# Patient Record
Sex: MALE | Race: ASIAN | NOT HISPANIC OR LATINO | Employment: FULL TIME | URBAN - METROPOLITAN AREA
[De-identification: names, ages, dates, MRNs, and addresses within clinical notes are randomized per-mention and may not be internally consistent; named-entity substitution may affect disease eponyms.]

---

## 2022-03-22 ENCOUNTER — TELEPHONE (OUTPATIENT)
Dept: SCHEDULING | Facility: IMAGING CENTER | Age: 32
End: 2022-03-22

## 2022-03-22 SDOH — ECONOMIC STABILITY: INCOME INSECURITY: HOW HARD IS IT FOR YOU TO PAY FOR THE VERY BASICS LIKE FOOD, HOUSING, MEDICAL CARE, AND HEATING?: NOT HARD AT ALL

## 2022-03-22 SDOH — ECONOMIC STABILITY: FOOD INSECURITY: WITHIN THE PAST 12 MONTHS, THE FOOD YOU BOUGHT JUST DIDN'T LAST AND YOU DIDN'T HAVE MONEY TO GET MORE.: NEVER TRUE

## 2022-03-22 SDOH — ECONOMIC STABILITY: TRANSPORTATION INSECURITY
IN THE PAST 12 MONTHS, HAS LACK OF RELIABLE TRANSPORTATION KEPT YOU FROM MEDICAL APPOINTMENTS, MEETINGS, WORK OR FROM GETTING THINGS NEEDED FOR DAILY LIVING?: NO

## 2022-03-22 SDOH — ECONOMIC STABILITY: TRANSPORTATION INSECURITY
IN THE PAST 12 MONTHS, HAS THE LACK OF TRANSPORTATION KEPT YOU FROM MEDICAL APPOINTMENTS OR FROM GETTING MEDICATIONS?: NO

## 2022-03-22 SDOH — ECONOMIC STABILITY: HOUSING INSECURITY
IN THE LAST 12 MONTHS, WAS THERE A TIME WHEN YOU DID NOT HAVE A STEADY PLACE TO SLEEP OR SLEPT IN A SHELTER (INCLUDING NOW)?: NO

## 2022-03-22 SDOH — ECONOMIC STABILITY: TRANSPORTATION INSECURITY
IN THE PAST 12 MONTHS, HAS LACK OF TRANSPORTATION KEPT YOU FROM MEETINGS, WORK, OR FROM GETTING THINGS NEEDED FOR DAILY LIVING?: NO

## 2022-03-22 SDOH — HEALTH STABILITY: PHYSICAL HEALTH: ON AVERAGE, HOW MANY DAYS PER WEEK DO YOU ENGAGE IN MODERATE TO STRENUOUS EXERCISE (LIKE A BRISK WALK)?: 0 DAYS

## 2022-03-22 SDOH — ECONOMIC STABILITY: FOOD INSECURITY: WITHIN THE PAST 12 MONTHS, YOU WORRIED THAT YOUR FOOD WOULD RUN OUT BEFORE YOU GOT MONEY TO BUY MORE.: NEVER TRUE

## 2022-03-22 SDOH — ECONOMIC STABILITY: INCOME INSECURITY: IN THE LAST 12 MONTHS, WAS THERE A TIME WHEN YOU WERE NOT ABLE TO PAY THE MORTGAGE OR RENT ON TIME?: NO

## 2022-03-22 SDOH — ECONOMIC STABILITY: HOUSING INSECURITY: IN THE LAST 12 MONTHS, HOW MANY PLACES HAVE YOU LIVED?: 2

## 2022-03-22 SDOH — HEALTH STABILITY: PHYSICAL HEALTH: ON AVERAGE, HOW MANY MINUTES DO YOU ENGAGE IN EXERCISE AT THIS LEVEL?: 0 MIN

## 2022-03-22 SDOH — HEALTH STABILITY: MENTAL HEALTH
STRESS IS WHEN SOMEONE FEELS TENSE, NERVOUS, ANXIOUS, OR CAN'T SLEEP AT NIGHT BECAUSE THEIR MIND IS TROUBLED. HOW STRESSED ARE YOU?: NOT AT ALL

## 2022-03-22 ASSESSMENT — SOCIAL DETERMINANTS OF HEALTH (SDOH)
HOW OFTEN DO YOU ATTEND CHURCH OR RELIGIOUS SERVICES?: NEVER
HOW OFTEN DO YOU GET TOGETHER WITH FRIENDS OR RELATIVES?: THREE TIMES A WEEK
HOW OFTEN DO YOU ATTEND CHURCH OR RELIGIOUS SERVICES?: NEVER
IN A TYPICAL WEEK, HOW MANY TIMES DO YOU TALK ON THE PHONE WITH FAMILY, FRIENDS, OR NEIGHBORS?: TWICE A WEEK
HOW OFTEN DO YOU HAVE A DRINK CONTAINING ALCOHOL: NEVER
HOW OFTEN DO YOU ATTENT MEETINGS OF THE CLUB OR ORGANIZATION YOU BELONG TO?: NEVER
HOW HARD IS IT FOR YOU TO PAY FOR THE VERY BASICS LIKE FOOD, HOUSING, MEDICAL CARE, AND HEATING?: NOT HARD AT ALL
HOW OFTEN DO YOU GET TOGETHER WITH FRIENDS OR RELATIVES?: THREE TIMES A WEEK
DO YOU BELONG TO ANY CLUBS OR ORGANIZATIONS SUCH AS CHURCH GROUPS UNIONS, FRATERNAL OR ATHLETIC GROUPS, OR SCHOOL GROUPS?: NO
WITHIN THE PAST 12 MONTHS, YOU WORRIED THAT YOUR FOOD WOULD RUN OUT BEFORE YOU GOT THE MONEY TO BUY MORE: NEVER TRUE
HOW OFTEN DO YOU ATTENT MEETINGS OF THE CLUB OR ORGANIZATION YOU BELONG TO?: NEVER
HOW OFTEN DO YOU HAVE SIX OR MORE DRINKS ON ONE OCCASION: NEVER
IN A TYPICAL WEEK, HOW MANY TIMES DO YOU TALK ON THE PHONE WITH FAMILY, FRIENDS, OR NEIGHBORS?: TWICE A WEEK
DO YOU BELONG TO ANY CLUBS OR ORGANIZATIONS SUCH AS CHURCH GROUPS UNIONS, FRATERNAL OR ATHLETIC GROUPS, OR SCHOOL GROUPS?: NO

## 2022-03-22 ASSESSMENT — LIFESTYLE VARIABLES
HOW OFTEN DO YOU HAVE A DRINK CONTAINING ALCOHOL: NEVER
HOW OFTEN DO YOU HAVE SIX OR MORE DRINKS ON ONE OCCASION: NEVER

## 2022-03-23 ENCOUNTER — OFFICE VISIT (OUTPATIENT)
Dept: INTERNAL MEDICINE | Facility: OTHER | Age: 32
End: 2022-03-23
Payer: COMMERCIAL

## 2022-03-23 VITALS
OXYGEN SATURATION: 94 % | TEMPERATURE: 97.6 F | HEIGHT: 73 IN | WEIGHT: 237.6 LBS | BODY MASS INDEX: 31.49 KG/M2 | SYSTOLIC BLOOD PRESSURE: 126 MMHG | HEART RATE: 85 BPM | DIASTOLIC BLOOD PRESSURE: 78 MMHG

## 2022-03-23 DIAGNOSIS — R00.0 TACHYCARDIA: ICD-10-CM

## 2022-03-23 DIAGNOSIS — M25.512 LEFT SHOULDER PAIN, UNSPECIFIED CHRONICITY: ICD-10-CM

## 2022-03-23 DIAGNOSIS — Z87.39 PERSONAL HISTORY OF GOUT: ICD-10-CM

## 2022-03-23 DIAGNOSIS — E66.9 OBESITY (BMI 30-39.9): ICD-10-CM

## 2022-03-23 PROCEDURE — 99204 OFFICE O/P NEW MOD 45 MIN: CPT | Mod: GC | Performed by: STUDENT IN AN ORGANIZED HEALTH CARE EDUCATION/TRAINING PROGRAM

## 2022-03-23 ASSESSMENT — PATIENT HEALTH QUESTIONNAIRE - PHQ9: CLINICAL INTERPRETATION OF PHQ2 SCORE: 0

## 2022-03-23 NOTE — PROGRESS NOTES
Danny Haskins is a 31 y.o. male here to establish care     HPI: 31-year-old male with a past medical history of heart attack several years ago on no current medications presents today for postsurgical follow-up.  -Patient states that he was seen at [place] urgent [facility] yesterday after he noticed his heart rate go up to the 120s on his watch.  He also had associated sweating of his palms during this time.  Patient denies any prior episodes, no chest pain, palpitations, fevers or chills, cough, burning sensation urination associated with this.  No bleeding episodes as well.  EKG done at [facility] was not concerning.  Patient states that his heart rate was elevated for about an hour and then subsided.  -He also endorses left shoulder discomfort, since January, denies any trauma associated with this.  Has progressively gotten better to the point that he does not need to take any pain medications at this time,no limitation of range of motion as well.  No tingling or numbness.  -Patient states that he had a history of gout several years ago while in [facility].  He also had an episode where his left great toe was swollen in January at which time it subsided spontaneously within a week.  He has not had any recent lab work.  -Review of systems is negative other than mentioned above.      Current medicines (including changes today)  No current outpatient medications on file.     No current facility-administered medications for this visit.     He  has no past medical history on file.  He  has no past surgical history on file.  Social History     Tobacco Use   • Smoking status: Current Every Day Smoker     Start date: 2009   • Smokeless tobacco: Never Used   • Tobacco comment: 10 cigarettes daily   Substance Use Topics   • Drug use: Never     Social History     Social History Narrative   • Not on file     History reviewed. No pertinent family history.  No family status information on file.       ROS  See  "HPI     Objective:     Physical Exam:  /78 (BP Location: Left arm, Patient Position: Sitting, BP Cuff Size: Large adult)   Pulse 85   Temp 36.4 °C (97.6 °F) (Temporal)   Ht 1.864 m (6' 1.4\")   Wt 108 kg (237 lb 9.6 oz)   SpO2 94%  Body mass index is 31.01 kg/m².  Constitutional: Alert. Well appearing. No distress.  Skin: Warm, dry, good turgor, no visible rashes.  Eye: Equal, round and reactive to light, conjunctiva clear, lids normal.  ENMT: Moist mucous membranes. Normal dentition. Oropharynx clear.  Neck: Trachea midline, no masses, no thyromegaly. No cervical or supraclavicular lymphadenopathy.  Respiratory: Normal effort. Lungs are clear to auscultation bilaterally.  Cardiovascular: Regular rate and rhythm. Normal S1/S2. No murmurs, rubs or gallops.   Abdomen: Soft, non-tender, non-distended. No masses, no hepatosplenomegaly.  Neuro: Moves all four extremities. No facial droop.  Psych: Answers questions appropriately. Normal affect and mood.      Assessment and Plan:     1. Obesity (BMI 30-39.9)  Patient's BMI is elevated, discussed about healthy lifestyle changes including exercise.  -We will check lipid panel  - Patient identified as having weight management issue.  Appropriate orders and counseling given.  - Lipid Profile; Future    2. Tachycardia  Patient had episode of tachycardia, EKG done in the ER yesterday was not concerning for any arrhythmias.  Heart rate was in the 90s in the ER.  This was associated with sweating of his palms.  Blood pressure today is within normal limits.  No signs of infection.  Differential diagnosis include anemia, dehydration, hypothyroidism.  Will check lab work as noted below  - TSH WITH REFLEX TO FT4; Future  - CBC WITHOUT DIFFERENTIAL; Future  - Comp Metabolic Panel; Future    3. Personal history of gout  No acute episode, per patient had an episode in January and 1 several years ago in [facility]  - URIC ACID; Future    4. Left shoulder pain, unspecified " chronicity  Ongoing since January, gradually better, unclear if there was any inciting event.  Patient denies any trauma.  No limitation of range of motion on exam.  -Patient states that he is not needing to use any pain medication as it does not bother him that much.  Will use heat compression if needed.  We will also check vitamin D levels.  - VITAMIN D,25 HYDROXY; Future        Follow up: Return in about 1 month (around 4/23/2022).         PLEASE NOTE: This note was created using voice recognition software.

## 2022-03-25 ENCOUNTER — HOSPITAL ENCOUNTER (OUTPATIENT)
Dept: LAB | Facility: MEDICAL CENTER | Age: 32
End: 2022-03-25
Attending: STUDENT IN AN ORGANIZED HEALTH CARE EDUCATION/TRAINING PROGRAM
Payer: COMMERCIAL

## 2022-03-25 DIAGNOSIS — R00.0 TACHYCARDIA: ICD-10-CM

## 2022-03-25 DIAGNOSIS — Z87.39 PERSONAL HISTORY OF GOUT: ICD-10-CM

## 2022-03-25 DIAGNOSIS — M25.512 LEFT SHOULDER PAIN, UNSPECIFIED CHRONICITY: ICD-10-CM

## 2022-03-25 DIAGNOSIS — E66.9 OBESITY (BMI 30-39.9): ICD-10-CM

## 2022-03-25 LAB
25(OH)D3 SERPL-MCNC: 22 NG/ML (ref 30–100)
ALBUMIN SERPL BCP-MCNC: 4.6 G/DL (ref 3.2–4.9)
ALBUMIN/GLOB SERPL: 1.7 G/DL
ALP SERPL-CCNC: 79 U/L (ref 30–99)
ALT SERPL-CCNC: 29 U/L (ref 2–50)
ANION GAP SERPL CALC-SCNC: 13 MMOL/L (ref 7–16)
AST SERPL-CCNC: 25 U/L (ref 12–45)
BILIRUB SERPL-MCNC: 0.5 MG/DL (ref 0.1–1.5)
BUN SERPL-MCNC: 15 MG/DL (ref 8–22)
CALCIUM SERPL-MCNC: 9.7 MG/DL (ref 8.5–10.5)
CHLORIDE SERPL-SCNC: 101 MMOL/L (ref 96–112)
CHOLEST SERPL-MCNC: 186 MG/DL (ref 100–199)
CO2 SERPL-SCNC: 25 MMOL/L (ref 20–33)
CREAT SERPL-MCNC: 0.96 MG/DL (ref 0.5–1.4)
ERYTHROCYTE [DISTWIDTH] IN BLOOD BY AUTOMATED COUNT: 43.2 FL (ref 35.9–50)
FASTING STATUS PATIENT QL REPORTED: NORMAL
GFR SERPLBLD CREATININE-BSD FMLA CKD-EPI: 108 ML/MIN/1.73 M 2
GLOBULIN SER CALC-MCNC: 2.7 G/DL (ref 1.9–3.5)
GLUCOSE SERPL-MCNC: 104 MG/DL (ref 65–99)
HCT VFR BLD AUTO: 43.8 % (ref 42–52)
HDLC SERPL-MCNC: 35 MG/DL
HGB BLD-MCNC: 14.2 G/DL (ref 14–18)
LDLC SERPL CALC-MCNC: ABNORMAL MG/DL
MCH RBC QN AUTO: 28.3 PG (ref 27–33)
MCHC RBC AUTO-ENTMCNC: 32.4 G/DL (ref 33.7–35.3)
MCV RBC AUTO: 87.3 FL (ref 81.4–97.8)
PLATELET # BLD AUTO: 278 K/UL (ref 164–446)
PMV BLD AUTO: 11 FL (ref 9–12.9)
POTASSIUM SERPL-SCNC: 4.9 MMOL/L (ref 3.6–5.5)
PROT SERPL-MCNC: 7.3 G/DL (ref 6–8.2)
RBC # BLD AUTO: 5.02 M/UL (ref 4.7–6.1)
SODIUM SERPL-SCNC: 139 MMOL/L (ref 135–145)
TRIGL SERPL-MCNC: 468 MG/DL (ref 0–149)
TSH SERPL DL<=0.005 MIU/L-ACNC: 2.25 UIU/ML (ref 0.38–5.33)
URATE SERPL-MCNC: 9.1 MG/DL (ref 2.5–8.3)
WBC # BLD AUTO: 7 K/UL (ref 4.8–10.8)

## 2022-03-25 PROCEDURE — 84443 ASSAY THYROID STIM HORMONE: CPT

## 2022-03-25 PROCEDURE — 36415 COLL VENOUS BLD VENIPUNCTURE: CPT

## 2022-03-25 PROCEDURE — 80061 LIPID PANEL: CPT

## 2022-03-25 PROCEDURE — 82306 VITAMIN D 25 HYDROXY: CPT

## 2022-03-25 PROCEDURE — 80053 COMPREHEN METABOLIC PANEL: CPT

## 2022-03-25 PROCEDURE — 85027 COMPLETE CBC AUTOMATED: CPT

## 2022-03-25 PROCEDURE — 84550 ASSAY OF BLOOD/URIC ACID: CPT

## 2022-03-27 ENCOUNTER — TELEPHONE (OUTPATIENT)
Dept: HOSPITALIST | Facility: MEDICAL CENTER | Age: 32
End: 2022-03-27
Payer: COMMERCIAL

## 2022-03-28 NOTE — TELEPHONE ENCOUNTER
Patient triglycerides are little high and uric acid is elevated, vitamin d is a little low as well. Please advice patient to schedule a telemedicine appointment sooner to discuss about treatment options for the same. He will need to be started on vitamin d supplements for vitamin d deficiency and allopurinol for gout. As there are several issues that need to addresses, it will be better if he can have a followup(ok for telemedicine within a week)

## 2022-03-31 ENCOUNTER — OFFICE VISIT (OUTPATIENT)
Dept: INTERNAL MEDICINE | Facility: OTHER | Age: 32
End: 2022-03-31
Payer: COMMERCIAL

## 2022-03-31 VITALS
HEART RATE: 94 BPM | WEIGHT: 240 LBS | DIASTOLIC BLOOD PRESSURE: 76 MMHG | SYSTOLIC BLOOD PRESSURE: 113 MMHG | OXYGEN SATURATION: 96 % | TEMPERATURE: 97.3 F | BODY MASS INDEX: 31.81 KG/M2 | HEIGHT: 73 IN

## 2022-03-31 DIAGNOSIS — E55.9 VITAMIN D INSUFFICIENCY: ICD-10-CM

## 2022-03-31 DIAGNOSIS — M10.072 ACUTE IDIOPATHIC GOUT INVOLVING TOE OF LEFT FOOT: ICD-10-CM

## 2022-03-31 DIAGNOSIS — E66.9 OBESITY (BMI 30-39.9): ICD-10-CM

## 2022-03-31 DIAGNOSIS — Z72.0 TOBACCO USE: ICD-10-CM

## 2022-03-31 DIAGNOSIS — E79.0 HYPERURICEMIA: ICD-10-CM

## 2022-03-31 DIAGNOSIS — E78.1 HYPERTRIGLYCERIDEMIA: ICD-10-CM

## 2022-03-31 PROCEDURE — 99214 OFFICE O/P EST MOD 30 MIN: CPT | Mod: GC

## 2022-03-31 RX ORDER — NICOTINE 21 MG/24HR
1 PATCH, TRANSDERMAL 24 HOURS TRANSDERMAL EVERY 24 HOURS
Qty: 28 PATCH | Refills: 2 | Status: SHIPPED | OUTPATIENT
Start: 2022-03-31 | End: 2022-06-13 | Stop reason: SDUPTHER

## 2022-03-31 RX ORDER — IBUPROFEN 800 MG/1
800 TABLET ORAL EVERY 8 HOURS PRN
Qty: 30 TABLET | Refills: 1 | Status: SHIPPED | OUTPATIENT
Start: 2022-03-31 | End: 2022-03-31

## 2022-03-31 RX ORDER — IBUPROFEN 800 MG/1
800 TABLET ORAL EVERY 8 HOURS PRN
Qty: 30 TABLET | Refills: 1 | Status: SHIPPED | OUTPATIENT
Start: 2022-03-31 | End: 2022-06-17

## 2022-03-31 ASSESSMENT — FIBROSIS 4 INDEX: FIB4 SCORE: 0.5

## 2022-03-31 NOTE — PATIENT INSTRUCTIONS
Please  your prescription for ibuprofen      High Triglycerides Eating Plan  Triglycerides are a type of fat in the blood. High levels of triglycerides can increase your risk of heart disease and stroke. If your triglyceride levels are high, choosing the right foods can help lower your triglycerides and keep your heart healthy. Work with your health care provider or a diet and nutrition specialist (dietitian) to develop an eating plan that is right for you.  What are tips for following this plan?  General guidelines    · Lose weight, if you are overweight. For most people, losing 5-10 lbs (2-5 kg) helps lower triglyceride levels. A weight-loss plan may include.  ? 30 minutes of exercise at least 5 days a week.  ? Reducing the amount of calories, sugar, and fat you eat.  · Eat a wide variety of fresh fruits, vegetables, and whole grains. These foods are high in fiber.  · Eat foods that contain healthy fats, such as fatty fish, nuts, seeds, and olive oil.  · Avoid foods that are high in added sugar, added salt (sodium), saturated fat, and trans fat.  · Avoid low-fiber, refined carbohydrates such as white bread, crackers, noodles, and white rice.  · Avoid foods with partially hydrogenated oils (trans fats), such as fried foods or stick margarine.  · Limit alcohol intake to no more than 1 drink a day for nonpregnant women and 2 drinks a day for men. One drink equals 12 oz of beer, 5 oz of wine, or 1½ oz of hard liquor. Your health care provider may recommend that you drink less depending on your overall health.  Reading food labels  · Check food labels for the amount of saturated fat. Choose foods with no or very little saturated fat.  · Check food labels for the amount of trans fat. Choose foods with no trans fat.  · Check food labels for the amount of cholesterol. Choose foods low in cholesterol. Ask your dietitian how much cholesterol you should have each day.  · Check food labels for the amount of sodium.  Choose foods with less than 140 milligrams (mg) per serving.  Shopping  · Buy dairy products labeled as nonfat (skim) or low-fat (1%).  · Avoid buying processed or prepackaged foods. These are often high in added sugar, sodium, and fat.  Cooking  · Choose healthy fats when cooking, such as olive oil or canola oil.  · Cook foods using lower fat methods, such as baking, broiling, boiling, or grilling.  · Make your own sauces, dressings, and marinades when possible, instead of buying them. Store-bought sauces, dressings, and marinades are often high in sodium and sugar.  Meal planning  · Eat more home-cooked food and less restaurant, buffet, and fast food.  · Eat fatty fish at least 2 times each week. Examples of fatty fish include salmon, trout, mackerel, tuna, and herring.  · If you eat whole eggs, do not eat more than 3 egg yolks per week.  What foods are recommended?  The items listed may not be a complete list. Talk with your dietitian about what dietary choices are best for you.  Grains  Whole wheat or whole grain breads, crackers, cereals, and pasta. Unsweetened oatmeal. Bulgur. Barley. Quinoa. Brown rice. Whole wheat flour tortillas.  Vegetables  Fresh or frozen vegetables. Low-sodium canned vegetables.  Fruits  All fresh, canned (in natural juice), or frozen fruits.  Meats and other protein foods  Skinless chicken or turkey. Ground chicken or turkey. Lean cuts of pork, trimmed of fat. Fish and seafood, especially salmon, trout, and herring. Egg whites. Dried beans, peas, or lentils. Unsalted nuts or seeds. Unsalted canned beans. Natural peanut or almond butter.  Dairy  Low-fat dairy products. Skim or low-fat (1%) milk. Reduced fat (2%) and low-sodium cheese. Low-fat ricotta cheese. Low-fat cottage cheese. Plain, low-fat yogurt.  Fats and oils  Tub margarine without trans fats. Light or reduced-fat mayonnaise. Light or reduced-fat salad dressings. Avocado. Safflower, olive, sunflower, soybean, and canola  oils.  What foods are not recommended?  The items listed may not be a complete list. Talk with your dietitian about what dietary choices are best for you.  Grains  White bread. White (regular) pasta. White rice. Cornbread. Bagels. Pastries. Crackers that contain trans fat.  Vegetables  Creamed or fried vegetables. Vegetables in a cheese sauce.  Fruits  Sweetened dried fruit. Canned fruit in syrup. Fruit juice.  Meats and other protein foods  Fatty cuts of meat. Ribs. Chicken wings. Reeves. Sausage. Bologna. Salami. Chitterlings. Fatback. Hot dogs. Bratwurst. Packaged lunch meats.  Dairy  Whole or reduced-fat (2%) milk. Half-and-half. Cream cheese. Full-fat or sweetened yogurt. Full-fat cheese. Nondairy creamers. Whipped toppings. Processed cheese or cheese spreads. Cheese curds.  Beverages  Alcohol. Sweetened drinks, such as soda, lemonade, fruit drinks, or punches.  Fats and oils  Butter. Stick margarine. Lard. Shortening. Ghee. Reeves fat. Tropical oils, such as coconut, palm kernel, or palm oils.  Sweets and desserts  Corn syrup. Sugars. Honey. Molasses. Candy. Jam and jelly. Syrup. Sweetened cereals. Cookies. Pies. Cakes. Donuts. Muffins. Ice cream.  Condiments  Store-bought sauces, dressings, and marinades that are high in sugar, such as ketchup and barbecue sauce.  Summary  · High levels of triglycerides can increase the risk of heart disease and stroke. Choosing the right foods can help lower your triglycerides.  · Eat plenty of fresh fruits, vegetables, and whole grains. Choose low-fat dairy and lean meats. Eat fatty fish at least twice a week.  · Avoid processed and prepackaged foods with added sugar, sodium, saturated fat, and trans fat.  · If you need suggestions or have questions about what types of food are good for you, talk with your health care provider or a dietitian.  This information is not intended to replace advice given to you by your health care provider. Make sure you discuss any questions you  have with your health care provider.  Document Released: 10/05/2005 Document Revised: 11/30/2018 Document Reviewed: 02/20/2018  Elsevier Patient Education © 2020 [employer]    Gout    Gout is painful swelling of your joints. Gout is a type of arthritis. It is caused by having too much uric acid in your body. Uric acid is a chemical that is made when your body breaks down substances called purines. If your body has too much uric acid, sharp crystals can form and build up in your joints. This causes pain and swelling.  Gout attacks can happen quickly and be very painful (acute gout). Over time, the attacks can affect more joints and happen more often (chronic gout).  What are the causes?  · Too much uric acid in your blood. This can happen because:  ? Your kidneys do not remove enough uric acid from your blood.  ? Your body makes too much uric acid.  ? You eat too many foods that are high in purines. These foods include organ meats, some seafood, and beer.  · Trauma or stress.  What increases the risk?  · Having a family history of gout.  · Being male and middle-aged.  · Being female and having gone through menopause.  · Being very overweight (obese).  · Drinking alcohol, especially beer.  · Not having enough water in the body (being dehydrated).  · Losing weight too quickly.  · Having an organ transplant.  · Having lead poisoning.  · Taking certain medicines.  · Having kidney disease.  · Having a skin condition called psoriasis.  What are the signs or symptoms?  An attack of acute gout usually happens in just one joint. The most common place is the big toe. Attacks often start at night. Other joints that may be affected include joints of the feet, ankle, knee, fingers, wrist, or elbow. Symptoms of an attack may include:  · Very bad pain.  · Warmth.  · Swelling.  · Stiffness.  · Shiny, red, or purple skin.  · Tenderness. The affected joint may be very painful to touch.  · Chills and fever.  Chronic gout may cause  symptoms more often. More joints may be involved. You may also have white or yellow lumps (tophi) on your hands or feet or in other areas near your joints.  How is this treated?  · Treatment for this condition has two phases: treating an acute attack and preventing future attacks.  · Acute gout treatment may include:  ? NSAIDs.  ? Steroids. These are taken by mouth or injected into a joint.  ? Colchicine. This medicine relieves pain and swelling. It can be given by mouth or through an IV tube.  · Preventive treatment may include:  ? Taking small doses of NSAIDs or colchicine daily.  ? Using a medicine that reduces uric acid levels in your blood.  ? Making changes to your diet. You may need to see a food expert (dietitian) about what to eat and drink to prevent gout.  Follow these instructions at home:  During a gout attack    · If told, put ice on the painful area:  ? Put ice in a plastic bag.  ? Place a towel between your skin and the bag.  ? Leave the ice on for 20 minutes, 2-3 times a day.  · Raise (elevate) the painful joint above the level of your heart as often as you can.  · Rest the joint as much as possible. If the joint is in your leg, you may be given crutches.  · Follow instructions from your doctor about what you cannot eat or drink.  Avoiding future gout attacks  · Eat a low-purine diet. Avoid foods and drinks such as:  ? Liver.  ? Kidney.  ? Anchovies.  ? Asparagus.  ? Herring.  ? Mushrooms.  ? Mussels.  ? Beer.  · Stay at a healthy weight. If you want to lose weight, talk with your doctor. Do not lose weight too fast.  · Start or continue an exercise plan as told by your doctor.  Eating and drinking  · Drink enough fluids to keep your pee (urine) pale yellow.  · If you drink alcohol:  ? Limit how much you use to:  § 0-1 drink a day for women.  § 0-2 drinks a day for men.  ? Be aware of how much alcohol is in your drink. In the [place] one drink equals one 12 oz bottle of beer (355 mL), one 5 oz glass  of wine (148 mL), or one 1½ oz glass of hard liquor (44 mL).  General instructions  · Take over-the-counter and prescription medicines only as told by your doctor.  · Do not drive or use heavy machinery while taking prescription pain medicine.  · Return to your normal activities as told by your doctor. Ask your doctor what activities are safe for you.  · Keep all follow-up visits as told by your doctor. This is important.  Contact a doctor if:  · You have another gout attack.  · You still have symptoms of a gout attack after 10 days of treatment.  · You have problems (side effects) because of your medicines.  · You have chills or a fever.  · You have burning pain when you pee (urinate).  · You have pain in your lower back or belly.  Get help right away if:  · You have very bad pain.  · Your pain cannot be controlled.  · You cannot pee.  Summary  · Gout is painful swelling of the joints.  · The most common site of pain is the big toe, but it can affect other joints.  · Medicines and avoiding some foods can help to prevent and treat gout attacks.  This information is not intended to replace advice given to you by your health care provider. Make sure you discuss any questions you have with your health care provider.  Document Released: 09/26/2009 Document Revised: 07/10/2019 Document Reviewed: 07/10/2019  Elsevier Patient Education © 2020 [employer]    Low-Purine Eating Plan  A low-purine eating plan involves making food choices to limit your intake of purine. Purine is a kind of uric acid. Too much uric acid in your blood can cause certain conditions, such as gout and kidney stones. Eating a low-purine diet can help control these conditions.  What are tips for following this plan?  Reading food labels    · Avoid foods with saturated or Trans fat.  · Check the ingredient list of grains-based foods, such as bread and cereal, to make sure that they contain whole grains.  · Check the ingredient list of sauces or soups  to make sure they do not contain meat or fish.  · When choosing soft drinks, check the ingredient list to make sure they do not contain high-fructose corn syrup.  Shopping  · Buy plenty of fresh fruits and vegetables.  · Avoid buying canned or fresh fish.  · Buy dairy products labeled as low-fat or nonfat.  · Avoid buying premade or processed foods. These foods are often high in fat, salt (sodium), and added sugar.  Cooking  · Use olive oil instead of butter when cooking. Oils like olive oil, canola oil, and sunflower oil contain healthy fats.  Meal planning  · Learn which foods do or do not affect you. If you find out that a food tends to cause your gout symptoms to flare up, avoid eating that food. You can enjoy foods that do not cause problems. If you have any questions about a food item, talk with your dietitian or health care provider.  · Limit foods high in fat, especially saturated fat. Fat makes it harder for your body to get rid of uric acid.  · Choose foods that are lower in fat and are lean sources of protein.  General guidelines  · Limit alcohol intake to no more than 1 drink a day for nonpregnant women and 2 drinks a day for men. One drink equals 12 oz of beer, 5 oz of wine, or 1½ oz of hard liquor. Alcohol can affect the way your body gets rid of uric acid.  · Drink plenty of water to keep your urine clear or pale yellow. Fluids can help remove uric acid from your body.  · If directed by your health care provider, take a vitamin C supplement.  · Work with your health care provider and dietitian to develop a plan to achieve or maintain a healthy weight. Losing weight can help reduce uric acid in your blood.  What foods are recommended?  The items listed may not be a complete list. Talk with your dietitian about what dietary choices are best for you.  Foods low in purines  Foods low in purines do not need to be limited. These include:  · All fruits.  · All low-purine vegetables, pickles, and  olives.  · Breads, pasta, rice, cornbread, and popcorn. Cake and other baked goods.  · All dairy foods.  · Eggs, nuts, and nut butters.  · Spices and condiments, such as salt, herbs, and vinegar.  · Plant oils, butter, and margarine.  · Water, sugar-free soft drinks, tea, coffee, and cocoa.  · Vegetable-based soups, broths, sauces, and gravies.  Foods moderate in purines  Foods moderate in purines should be limited to the amounts listed.  · ½ cup of asparagus, cauliflower, spinach, mushrooms, or green peas, each day.  · 2/3 cup uncooked oatmeal, each day.  · ¼ cup dry wheat bran or wheat germ, each day.  · 2-3 ounces of meat or poultry, each day.  · 4-6 ounces of shellfish, such as crab, lobster, oysters, or shrimp, each day.  · 1 cup cooked beans, peas, or lentils, each day.  · Soup, broths, or bouillon made from meat or fish. Limit these foods as much as possible.  What foods are not recommended?  The items listed may not be a complete list. Talk with your dietitian about what dietary choices are best for you.  Limit your intake of foods high in purines, including:  · Beer and other alcohol.  · Meat-based gravy or sauce.  · Canned or fresh fish, such as:  ? Anchovies, sardines, herring, and tuna.  ? Mussels and scallops.  ? Codfish, trout, and marzena.  · Reeves.  · Organ meats, such as:  ? Liver or kidney.  ? Tripe.  ? Sweetbreads (thymus gland or pancreas).  · Wild game or goose.  · Yeast or yeast extract supplements.  · Drinks sweetened with high-fructose corn syrup.  Summary  · Eating a low-purine diet can help control conditions caused by too much uric acid in the body, such as gout or kidney stones.  · Choose low-purine foods, limit alcohol, and limit foods high in fat.  · You will learn over time which foods do or do not affect you. If you find out that a food tends to cause your gout symptoms to flare up, avoid eating that food.  This information is not intended to replace advice given to you by your health  care provider. Make sure you discuss any questions you have with your health care provider.  Document Released: 04/13/2012 Document Revised: 11/30/2018 Document Reviewed: 01/31/2018  Elsevier Patient Education © 2020 [employer]

## 2022-03-31 NOTE — PROGRESS NOTES
Date of Service:  3/31/22    CC: bloodwork results    HPI:  Gil Haskins  is a 31 y.o. male presents here for bloodwork followup. Seen by Dr. Thorne on 3/23/22. Patient has hx of gout attack years ago and another similar episode affecting the left big toe in January but did not have any medical insurance at the time, so did not seek medical attention. The left big toe was painful but not red, lasted for about 7 days, and went away on its own, and patient did not take any medications.     Patient states on Monday at work, he had sudden sensation of electric shock to top of his head lasting 15 minutes, during the middle of an argument. Patient described it as 7/10 in severity, and gradually improved with time. Denies any eye pain, numbness, focal motor or sensory deficits, vision changes. Denies any similar episodes in the past. Sensation not present today.    Health maintenance-   Diet- patient eats twice a day, both times after work/4pm. Diet consists mostly of beef agarwal usually olive oil based and rice.   Lifestyle- patient sleeps about 8 hours a day, feels well energized and denies any fatigue. Takes multivitamin about 3 times a week.   Tobacco- smokes 1/2ppd, would like to quit, referred to tobacco cessation resources at last clinic visit. States tried quitting before by vaping which did not work.     Review of systems:  Review of Systems   Constitutional: Negative for chills, fever and malaise/fatigue.   HENT: Negative.    Eyes: Negative.    Respiratory: Negative for cough and shortness of breath.    Cardiovascular: Negative for chest pain and leg swelling.   Gastrointestinal: Negative for abdominal pain, blood in stool, constipation, diarrhea, melena, nausea and vomiting.   Genitourinary: Negative.    Musculoskeletal: Negative.    Skin: Negative.    Neurological: Negative for dizziness, tingling, sensory change, speech change, focal weakness and headaches.        Past Medical History:  Patient Active  Problem List    Diagnosis Date Noted   • Obesity (BMI 30-39.9) 03/23/2022   • Tachycardia 03/23/2022   • Personal history of gout 03/23/2022       Past Surgical History:    has no past surgical history on file.    Medications:  Current Outpatient Medications   Medication Sig Dispense Refill   • ibuprofen (MOTRIN) 800 MG Tab Take 1 Tablet by mouth every 8 hours as needed. Within 24-48 hours of gout flare onset, reduce dose as symptoms improve, discontinue 2-3 days after resolution of clinical signs, usual duration 5-7 days 30 Tablet 1   • nicotine (NICODERM) 14 MG/24HR PATCH 24 HR Place 1 Patch on the skin every 24 hours. 28 Patch 2     No current facility-administered medications for this visit.       Allergies:  No Known Allergies    Family History:   family history is not on file.     Social History:    Social History     Tobacco Use   • Smoking status: Current Every Day Smoker     Start date: 2009   • Smokeless tobacco: Never Used   • Tobacco comment: 10 cigarettes daily   Substance Use Topics   • Drug use: Never     Physical Exam:  Vitals:    03/31/22 0758   BP: 113/76   Pulse: 94   Temp: 36.3 °C (97.3 °F)   SpO2: 96%     Body mass index is 31.32 kg/m².  Physical Exam  Constitutional:       General: He is not in acute distress.     Appearance: Normal appearance. He is not toxic-appearing.   HENT:      Head: Normocephalic.      Right Ear: External ear normal.      Left Ear: External ear normal.      Nose: Nose normal.   Eyes:      General: No scleral icterus.     Extraocular Movements: Extraocular movements intact.      Conjunctiva/sclera: Conjunctivae normal.   Cardiovascular:      Rate and Rhythm: Normal rate and regular rhythm.      Heart sounds: Normal heart sounds. No murmur heard.  Pulmonary:      Effort: No respiratory distress.      Breath sounds: Normal breath sounds.   Abdominal:      Palpations: Abdomen is soft.      Tenderness: There is no abdominal tenderness.   Musculoskeletal:         General: No  swelling or tenderness.      Cervical back: No rigidity or tenderness.      Right lower leg: No edema.      Left lower leg: No edema.   Skin:     General: Skin is warm and dry.      Findings: No rash.   Neurological:      General: No focal deficit present.      Mental Status: He is alert.      Motor: No weakness.   Psychiatric:         Mood and Affect: Mood normal.         Behavior: Behavior normal.        Assessment/Plan:    1. Hypertriglyceridemia  - Referral to Nutrition Services  - Lipid Profile; Future  - Comp Metabolic Panel; Future    - lipid panel 3/25/22: , , HDL 35, LDL unable to be calculated.   - counseled on healthy diet and exercise  - pt would like to be referred to dietician to help tailor the recommendations to the foods he eat.  - As TC wnl, only TG high, no familial hx of hyperlipidemia. Will trial diet and lifestyle changes before considering statin therapy. Will followup in 10 weeks with lipid panel and CMP. If lipids remain high, will initiate on medications.     2. Hyperuricemia  - Referral to Nutrition Services  - uric acid 9.1, mildly high  - will refer to dietician to help assist counseling on foods he could avoid to prevent triggers of gout attacks. Patient denies consuming large amounts of seafood. Does eat plenty of beef agarwal.    3. Acute idiopathic gout involving toe of left foot  - ibuprofen (MOTRIN) 800 MG Tab; Take 1 Tablet by mouth every 8 hours as needed. Within 24-48 hours of gout flare onset, reduce dose as symptoms improve, discontinue 2-3 days after resolution of clinical signs, usual duration 5-7 days  Dispense: 30 Tablet; Refill: 1  - recent possible ep of gout similar to prior one several years ago, counseled on taking higher doses of ibuprofen the next time he has another flareup. Will consider other gout medications if have more frequent episodes.  - counseled on avoiding food triggers for gout.     4. Tobacco use  - nicotine (NICODERM) 14 MG/24HR PATCH 24 HR;  Place 1 Patch on the skin every 24 hours.  Dispense: 28 Patch; Refill: 2  - pt would like to quit, have tried vaping in the past did not work.   - willing to try nicotine patch, will prescribe.    5. Obesity (BMI 30-39.9)  - counseled on healthy lifestyle and regular exercises.   - pt would like additional help and guidance with weight loss, referred to dietician    6. Vitamin D insufficiency   - Vit D 25 hydroxy level 22  - denies any fatigue. Feels well rested with 8 hours of sleep/day on average.   - recommend daily multivitamin.    All imaging results and lab results and consult notes are reviewed at this visit.  Followup: Return in about 10 weeks (around 6/9/2022) for Dr. Valverde .    Sasha Valverde, DO  Internal Medicine PGY-1

## 2022-04-03 PROBLEM — E78.1 HYPERTRIGLYCERIDEMIA: Status: ACTIVE | Noted: 2022-04-03

## 2022-04-03 PROBLEM — R00.0 TACHYCARDIA: Status: RESOLVED | Noted: 2022-03-23 | Resolved: 2022-04-03

## 2022-04-03 PROBLEM — E55.9 VITAMIN D INSUFFICIENCY: Status: ACTIVE | Noted: 2022-04-03

## 2022-04-03 PROBLEM — E79.0 HYPERURICEMIA: Status: ACTIVE | Noted: 2022-04-03

## 2022-04-03 PROBLEM — Z72.0 TOBACCO USE: Status: ACTIVE | Noted: 2022-04-03

## 2022-04-03 ASSESSMENT — ENCOUNTER SYMPTOMS
CONSTIPATION: 0
ABDOMINAL PAIN: 0
DIARRHEA: 0
BLOOD IN STOOL: 0
HEADACHES: 0
FEVER: 0
COUGH: 0
MUSCULOSKELETAL NEGATIVE: 1
EYES NEGATIVE: 1
TINGLING: 0
SPEECH CHANGE: 0
VOMITING: 0
SHORTNESS OF BREATH: 0
NAUSEA: 0
SENSORY CHANGE: 0
DIZZINESS: 0
CHILLS: 0
FOCAL WEAKNESS: 0

## 2022-04-21 ENCOUNTER — NON-PROVIDER VISIT (OUTPATIENT)
Dept: INTERNAL MEDICINE | Facility: OTHER | Age: 32
End: 2022-04-21
Payer: COMMERCIAL

## 2022-04-21 DIAGNOSIS — E78.1 HYPERTRIGLYCERIDEMIA: ICD-10-CM

## 2022-04-21 DIAGNOSIS — E66.9 OBESITY (BMI 30-39.9): ICD-10-CM

## 2022-04-21 DIAGNOSIS — E79.0 HYPERURICEMIA: ICD-10-CM

## 2022-04-21 DIAGNOSIS — E55.9 VITAMIN D INSUFFICIENCY: ICD-10-CM

## 2022-04-21 PROCEDURE — 97802 MEDICAL NUTRITION INDIV IN: CPT | Performed by: DIETITIAN, REGISTERED

## 2022-04-21 NOTE — PROGRESS NOTES
Gil is a 31 y.o. male here for nutrition assessment for obesity, elevated TG on recent labs, and history of gout with increased uric acid on recent labs    Reports that he has history of heart attack and high TG  Reports that he eats culturally based foods    24 hour recall:  B - usually skips  L - 7:40pm deep fried foods, oil, spicy foods, deep fried veggies, lentils, puffed rice, garbanzo beans  D - 10pm - chicken sandwiches with white sauce, chicken, white bread    B - nothing  L - 4pm - rice, agarwal with chicken, boiled eggs  D- 8:30pm - sandwich or second serving of rice    Beverages: 20-28 oz Regular Soda(Coke), 32 oz Water, no coffee/tea    Alcohol: zero      Sleep: 8:30pm - 5:30pm (usually more than 9 hours)    Exercise: not doing anything intentional   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     Gil was open to the many suggestions provided to him today after reviewing in detail his current food habits and his questions regarding how to avoid gout flare ups and the necessary diet for that when he also is concerned with his TG levels and his cultural preferences. We discussed in detail the necessary changes he can make to his diet to support reducing his TG along with reducing his risk of another gout flare up. Encouraged low purine rich foods that are low in calories, more fiber in his diet, less to no sugar sweetened beverages and daily exercise. He was open to working on those and we set goals to support him with that. RTC with [facility] in 1 month    Time spent: 75 minutes

## 2022-04-26 ENCOUNTER — OFFICE VISIT (OUTPATIENT)
Dept: INTERNAL MEDICINE | Facility: OTHER | Age: 32
End: 2022-04-26
Payer: COMMERCIAL

## 2022-04-26 VITALS
BODY MASS INDEX: 30.77 KG/M2 | WEIGHT: 232.2 LBS | DIASTOLIC BLOOD PRESSURE: 79 MMHG | HEIGHT: 73 IN | OXYGEN SATURATION: 96 % | TEMPERATURE: 98.1 F | HEART RATE: 79 BPM | SYSTOLIC BLOOD PRESSURE: 122 MMHG

## 2022-04-26 DIAGNOSIS — R00.0 TACHYCARDIA: ICD-10-CM

## 2022-04-26 DIAGNOSIS — M10.00 IDIOPATHIC GOUT, UNSPECIFIED CHRONICITY, UNSPECIFIED SITE: ICD-10-CM

## 2022-04-26 DIAGNOSIS — E78.1 HYPERTRIGLYCERIDEMIA: ICD-10-CM

## 2022-04-26 PROCEDURE — 99213 OFFICE O/P EST LOW 20 MIN: CPT | Mod: GE | Performed by: STUDENT IN AN ORGANIZED HEALTH CARE EDUCATION/TRAINING PROGRAM

## 2022-04-26 ASSESSMENT — FIBROSIS 4 INDEX: FIB4 SCORE: 0.5

## 2022-04-26 NOTE — PROGRESS NOTES
Gil Haskins is a 31 y.o. male for routine follow-up.    Tachycardia: Patient previously had issues with tachycardia at which time EKG was checked and was within normal limits.  Heart rate normal during office visit.  Patient reports tachycardia and palpitations at home 1 episode a week.  TSH within normal limits.  No associated dizziness or chest pain, shortness of breath . Will consider Zio patch.    -Patient was noted to have hypertriglyceridemia and  uric acid of 9.1.  Labs, with Dr. Valverde referred him over to nutrition services patient has already established with them.  Patient is due to get repeat labs done end of May and follow-up.  No complaints at this time. No further gout flares.    -Due for flu shot and covid booster advised to get them at pharmacy.        Current medicines (including changes today)  Current Outpatient Medications   Medication Sig Dispense Refill   • ibuprofen (MOTRIN) 800 MG Tab Take 1 Tablet by mouth every 8 hours as needed. Within 24-48 hours of gout flare onset, reduce dose as symptoms improve, discontinue 2-3 days after resolution of clinical signs, usual duration 5-7 days 30 Tablet 1   • nicotine (NICODERM) 14 MG/24HR PATCH 24 HR Place 1 Patch on the skin every 24 hours. 28 Patch 2     No current facility-administered medications for this visit.     He  has no past medical history on file.  He  has no past surgical history on file.  Social History     Tobacco Use   • Smoking status: Current Every Day Smoker     Start date: 2009   • Smokeless tobacco: Never Used   • Tobacco comment: 10 cigarettes daily   Vaping Use   • Vaping Use: Never used   Substance Use Topics   • Alcohol use: Never   • Drug use: Never     Social History     Social History Narrative   • Not on file     No family history on file.  No family status information on file.       ROS  See HPI     Objective:     Physical Exam:  /79 (BP Location: Right arm, Patient Position: Sitting, BP Cuff Size: Adult)   Pulse  "79   Temp 36.7 °C (98.1 °F) (Temporal)   Ht 1.854 m (6' 1\")   Wt 105 kg (232 lb 3.2 oz)   SpO2 96%  Body mass index is 30.64 kg/m².  Constitutional: Alert. Well appearing. No distress.  Skin: Warm, dry, good turgor, no visible rashes.  Eye: Equal, round and reactive to light, conjunctiva clear, lids normal.  ENMT: Moist mucous membranes. Normal dentition. Oropharynx clear.  Neck: Trachea midline, no masses, no thyromegaly. No cervical or supraclavicular lymphadenopathy.  Respiratory: Normal effort. Lungs are clear to auscultation bilaterally.  Cardiovascular: Regular rate and rhythm. Normal S1/S2. No murmurs, rubs or gallops.   Abdomen: Soft, non-tender, non-distended. No masses, no hepatosplenomegaly.  Neuro: Moves all four extremities. No facial droop.  Psych: Answers questions appropriately. Normal affect and mood.      Assessment and Plan:     1. Tachycardia  -Heart rate within normal limits during office visits, subjectively patient reports 1 episode a week.  Given labs within normal limits will consider Zio patch  - [facility] ZIO PATCH MONITOR; Future    2. Idiopathic gout, unspecified chronicity, unspecified site  -Uric acid of 9.1, patient already seen nutritional services, due for repeat labs in about 5 weeks.  -No further episodes, advised to use ibuprofen if needed.    3. Hypertriglyceridemia  -See nutrition services already, patient scheduled to get lab work done in 5 weeks.               PLEASE NOTE: This note was created using voice recognition software.   "

## 2022-04-27 VITALS — WEIGHT: 232 LBS | BODY MASS INDEX: 30.61 KG/M2

## 2022-04-27 ASSESSMENT — FIBROSIS 4 INDEX: FIB4 SCORE: 0.5

## 2022-05-19 ENCOUNTER — NON-PROVIDER VISIT (OUTPATIENT)
Dept: CARDIOLOGY | Facility: MEDICAL CENTER | Age: 32
End: 2022-05-19
Attending: STUDENT IN AN ORGANIZED HEALTH CARE EDUCATION/TRAINING PROGRAM
Payer: COMMERCIAL

## 2022-05-19 DIAGNOSIS — I49.1 PREMATURE ATRIAL CONTRACTIONS: ICD-10-CM

## 2022-05-19 DIAGNOSIS — I49.3 PVCS (PREMATURE VENTRICULAR CONTRACTIONS): ICD-10-CM

## 2022-05-19 DIAGNOSIS — R00.0 TACHYCARDIA: ICD-10-CM

## 2022-05-19 DIAGNOSIS — I47.10 SVT (SUPRAVENTRICULAR TACHYCARDIA) (HCC): ICD-10-CM

## 2022-05-19 NOTE — PROGRESS NOTES
Home enrollment completed for the 14 day Zio XT Holter monitoring program per Brooke Thorne MD.  >Monitor to be mailed to patient by [employer].  >Currently pending EOS.  >6/21/22 Pending return.  >6/27/22 In transit to [facility].

## 2022-06-13 DIAGNOSIS — Z72.0 TOBACCO USE: ICD-10-CM

## 2022-06-13 RX ORDER — NICOTINE 21 MG/24HR
1 PATCH, TRANSDERMAL 24 HOURS TRANSDERMAL EVERY 24 HOURS
Qty: 28 PATCH | Refills: 2 | Status: SHIPPED | OUTPATIENT
Start: 2022-06-13 | End: 2022-09-14

## 2022-06-13 NOTE — TELEPHONE ENCOUNTER
Nicotine Patch Refill    Last seen: 4/26/22 by Dr. Thorne  Next appt: None    Was the patient seen in the last year in this department? Yes   Does patient have an active prescription for medications requested? No   Received Request Via: Pharmacy

## 2022-06-16 ENCOUNTER — TELEPHONE (OUTPATIENT)
Dept: HOSPITALIST | Facility: MEDICAL CENTER | Age: 32
End: 2022-06-16
Payer: COMMERCIAL

## 2022-06-16 NOTE — TELEPHONE ENCOUNTER
Please advice patient to come in  for physical exam, will need to examine to determine best management.

## 2022-06-17 ENCOUNTER — OFFICE VISIT (OUTPATIENT)
Dept: INTERNAL MEDICINE | Facility: OTHER | Age: 32
End: 2022-06-17
Payer: COMMERCIAL

## 2022-06-17 VITALS
HEART RATE: 92 BPM | DIASTOLIC BLOOD PRESSURE: 82 MMHG | WEIGHT: 235 LBS | OXYGEN SATURATION: 96 % | BODY MASS INDEX: 31.14 KG/M2 | SYSTOLIC BLOOD PRESSURE: 129 MMHG | TEMPERATURE: 97.8 F | HEIGHT: 73 IN

## 2022-06-17 DIAGNOSIS — M25.561 ACUTE PAIN OF RIGHT KNEE: ICD-10-CM

## 2022-06-17 PROCEDURE — 99213 OFFICE O/P EST LOW 20 MIN: CPT | Mod: GE

## 2022-06-17 RX ORDER — IBUPROFEN 200 MG
500 CAPSULE ORAL DAILY
COMMUNITY
End: 2023-05-24

## 2022-06-17 RX ORDER — VITAMIN B COMPLEX
1000 TABLET ORAL DAILY
COMMUNITY

## 2022-06-17 ASSESSMENT — FIBROSIS 4 INDEX: FIB4 SCORE: 0.52

## 2022-06-17 NOTE — PROGRESS NOTES
Established Patient    Patient Care Team:  Brooke Thorne M.D. as PCP - General (Internal Medicine)    Gil Haskins is a 32 y.o. male who presents today with the following Chief Complaint(s): Follow up for The encounter diagnosis was Acute pain of right knee.    HPI:  Patient presenting with approximately 2-week history of right thigh/knee pain.  He describes the pain as a muscle ache just above the knee joint.  He denies any joint involvement.  He denies any swelling, redness, or erythema.  He denies any history of trauma or injury to the area.  He states that the pain is worse after sitting for prolonged periods with his leg flexed, and his symptoms are resolved when he straightens out his leg.  He has not taken any over-the-counter medications to help relieve this.    He was originally evaluated by urgent care [place] on May 27 at which point an x-ray was done which he reports was negative.  After that he did go on a plane to go to [facility], however the pain was present before he took that flight.  No history of torn ligaments or other injuries to the knee, does report history of knee popping and occasional pain.    Only medication is nicotine patch and OTC vit D and Ca.      ROS:     Denies any new chest pain or shortness of breath.  No changes to urinary or bowel function.  See HPI.    History reviewed. No pertinent past medical history.  Social History     Tobacco Use   • Smoking status: Current Every Day Smoker     Start date: 2009   • Smokeless tobacco: Never Used   • Tobacco comment: 5-6 cigarettes daily   Vaping Use   • Vaping Use: Never used   Substance Use Topics   • Alcohol use: Never   • Drug use: Never     Current Outpatient Medications   Medication Sig Dispense Refill   • vitamin D3 (CHOLECALCIFEROL) 1000 Unit (25 mcg) Tab Take 1,000 Units by mouth every day.     • calcium carbonate (OS-ROLANDO 500) 1250 (500 Ca) MG Tab Take 500 mg by mouth every day.     • nicotine (NICODERM) 14 MG/24HR  "PATCH 24 HR Place 1 Patch on the skin every 24 hours. 28 Patch 2     No current facility-administered medications for this visit.     Physical Exam:  /82 (BP Location: Left arm, Patient Position: Sitting, BP Cuff Size: Small adult)   Pulse 92   Temp 36.6 °C (97.8 °F) (Temporal)   Ht 1.854 m (6' 1\")   Wt 107 kg (235 lb)   SpO2 96%   BMI 31.00 kg/m²   General: Well developed, well nourished male, in no distress.  Eyes: Conjuntiva without any obvious injection or erythema.   Cardiovascular: Heart is regular without murmur  Lungs: Clear to auscultation bilaterally. No wheezes, rhonci or crackles heard. Respiratory effort is normal.  Abd: Soft, non-tender  Ext: No edema.   MSK: no gross deformity. Full ROM in knees bilaterally. No erythema, warmths, swelling of R knee/upper leg.      Assessment and Plan:   1. Acute pain of right knee    Benign exam and recent xray normal.  Query possible quad tendonitis vs patellar bursitis.   Will plan for conservative treatment with topical diclofenac, oral NSAID prn and PT for 6 weeks.   Will consider additional imaging/referral to orthopedics if symptoms not improving after conservative interventions.    - Referral to Physical Therapy      Return if symptoms worsen or fail to improve.      Andie Mark M.D. PGY1  [facility]    This note was created using voice recognition software.  While every attempt is made to ensure accuracy of transcription, occasionally errors occur.    "

## 2022-06-17 NOTE — PATIENT INSTRUCTIONS
It is great meeting you today Gil!    Today we discussed your not knee knee pain.  This is concerning for likely a patellar tendinitis or inflammation to the ligamentous structure above the knee.  Recommend physical therapy for 6 weeks which should resolve the pain.    Can also do topical diclofenac (Voltaren) gel.    Okay to continue taking ibuprofen as needed for pain.    If symptoms get worse or you notice increasing swelling, redness, heat in the knee joint area please return to the clinic.    Follow-up as needed.

## 2022-07-01 ENCOUNTER — TELEPHONE (OUTPATIENT)
Dept: CARDIOLOGY | Facility: MEDICAL CENTER | Age: 32
End: 2022-07-01
Payer: COMMERCIAL

## 2022-07-05 PROCEDURE — 93248 EXT ECG>7D<15D REV&INTERPJ: CPT | Performed by: INTERNAL MEDICINE

## 2022-08-08 ENCOUNTER — APPOINTMENT (OUTPATIENT)
Dept: PHYSICAL THERAPY | Facility: REHABILITATION | Age: 32
End: 2022-08-08
Payer: COMMERCIAL

## 2022-08-18 ENCOUNTER — PHYSICAL THERAPY (OUTPATIENT)
Dept: PHYSICAL THERAPY | Facility: REHABILITATION | Age: 32
End: 2022-08-18
Payer: COMMERCIAL

## 2022-08-18 DIAGNOSIS — M25.561 ACUTE PAIN OF RIGHT KNEE: ICD-10-CM

## 2022-08-18 PROCEDURE — 97162 PT EVAL MOD COMPLEX 30 MIN: CPT

## 2022-08-18 PROCEDURE — 97110 THERAPEUTIC EXERCISES: CPT

## 2022-08-18 SDOH — ECONOMIC STABILITY: GENERAL: QUALITY OF LIFE: GOOD

## 2022-08-18 ASSESSMENT — ENCOUNTER SYMPTOMS
QUALITY: ACHING
PAIN TIMING: OCCASIONAL
PAIN SCALE AT HIGHEST: 6
QUALITY: CRAMPING
EXACERBATED BY: SITTING
PAIN SCALE: 1
PAIN SCALE AT LOWEST: 0

## 2022-08-18 NOTE — OP THERAPY EVALUATION
Outpatient Physical Therapy  INITIAL EVALUATION    [facility]  [address], [address]  [facility] [address]  Phone:  [phone]    Date of Evaluation: 2022    Patient: Gil Haskins  YOB: 1990  MRN: [id number]     Referring Provider: Andie Mark M.D.  [address] [place] [address]   Referring Diagnosis Pain in right knee [M25.561]     Time Calculation  Start time: 0415  Stop time: 0500 Time Calculation (min): 45 minutes         Chief Complaint: Knee Problem    Visit Diagnoses     ICD-10-CM   1. Acute pain of right knee  M25.561       Date of onset of impairment: 2022    Subjective:   History of Present Illness:     Date of onset:  2022    Mechanism of injury:  The patient is a 32 year old male who reports (R) knee pain ~ 3-4 years ago. He has pain while sitting driving above the (R) knee; goes away when he extends the leg. Driving or riding in airplanes increase his pain.Patient has pain at 3/10 while hiking afterwards . Pat has past hx of (L) meniscus excision ~ 1 years ago.  Quality of life:  Good  Headaches:  no headaches  Sleep disturbance:  Not disrupted  Pain:     Current pain ratin    At best pain ratin    At worst pain ratin    Quality:  Aching and cramping    Pain timing:  Occasional    Aggravating factors:  Sitting    Progression:  Worsening  Social Support:     Lives in:  Multiple-level home  Diagnostic Tests:     X-ray: normal    Patient Goals:     Patient goals for therapy:  Decreased pain    Other patient goals:  To decrease pain; to increase driving time    No past medical history on file.  No past surgical history on file.  Social History     Tobacco Use    Smoking status: Every Day     Types: Cigarettes     Start date:     Smokeless tobacco: Never    Tobacco comments:     5-6 cigarettes daily   Substance Use Topics    Alcohol use: Never     Family and Occupational History     Socioeconomic History     Marital status:      Spouse name: Not on file    Number of children: Not on file    Years of education: Not on file    Highest education level: Doctorate   Occupational History    Not on file       Objective     Palpation     Right   Tenderness of the rectus femoris.     Tenderness     Right Knee   Tenderness in the patellar tendon.     Active Range of Motion   Left Knee   Flexion: WFL  Prone flexion: WFL  Extension: WFL  Extensor lag: WFL    Right Knee   Flexion: WFL  Prone flexion: with pain  Extension: WFL  Extensor lag: WFL    Strength:      Left Hip   Planes of Motion   Flexion: 5  Extension: 5  Abduction: 5  Adduction: 5  External rotation: 5  Internal rotation: 5    Right Hip   Planes of Motion   Flexion: 4+  Extension: 5  Abduction: 5  Adduction: 5  External rotation: 5  Internal rotation: 5    Left Knee   Flexion: 5  Prone flexion: 5  Extension: 5  Quadriceps contraction: good    Right Knee   Flexion: 5  Prone flexion: 4+  Extension: 5  Quadriceps contraction: fair    Tests     Right Knee   Positive patellar apprehension and patellar compression.     Additional Tests Details  Bilateral hamstring tightness 40-45 deg measured from 90/90 SLR position supine   Ambulation     Observational Gait   Walking speed, stride length, left stance time, right stance time, left swing time, right swing time, left step length and right step length within functional limits. Stride width: WFL.      Functional Assessment   Squat   Trunk lean left.     Single Leg Squat   Left Leg  Sitting toward right side.     Right Leg  Pain.       Therapeutic Exercises (CPT 75290):     1. quad stretch (sidelying)    2. prone quad stretch    3. hamstring stretches, 2 x30 sec    4. SLR's/ quad sets, 1 x10 reps    Therapeutic Treatments and Modalities:     1. Manual Therapy (CPT 49599), (R) knee, IASTM    2. E Stim Unattended (CPT 10669)  Time-based treatments/modalities:    Physical Therapy Timed Treatment Charges  Therapeutic exercise  minutes (CPT 60482): 10 minutes      Assessment, Response and Plan:   Impairments: activity intolerance, lacks appropriate home exercise program, pain with function and weight-bearing intolerance    Assessment details:  The patient is a 32 year old male who reports (R) knee pain and secondary back pain. He currently has difficulty with squatting and sitting for extended periods, standing up and going up an down stairs. He would benefit from skilled physical therapy to address possible patella femoral syndrome working with PROM/AROM; manual therapy techniques; pain management modalities, functional training and activity tolerance    Barriers to therapy:  None  Prognosis: good    Goals:   Short Term Goals:   1) Indep with HEP   2) 25% better at sitting for prolonged periods of 30-60 minutes  3) Less pain squatting 25% or more in the (R) knee  Short term goal time span:  2-4 weeks      Long Term Goals:    1) Progression/ regression with HEP   2) Able to go on airplane travels sitting with less pain by 1-2 levels on VAS    3) Negotiates stairs going up and down   4) Increase strength in (R) hip flexion 4+/5 to 5/5  5) Decrease hamstring tightness by 5-10 deg in supine 90/90 SLR position bilaterally  Long term goal time span:  4-6 weeks    Plan:   Therapy options:  Physical therapy treatment to continue  Planned therapy interventions:  E Stim Unattended (CPT 20021), Manual Therapy (CPT 68045), Neuromuscular Re-education (CPT 43821), Self Care ADL Training (CPT 20299), Therapeutic Activities (CPT 72224), Therapeutic Exercise (CPT 59916) and Gait Training (CPT 33079)  Frequency:  2x week  Duration in weeks:  6  Duration in visits:  12  Discussed with:  Patient    Functional Assessment Used        Referring provider co-signature:  I have reviewed this plan of care and my co-signature certifies the need for services.    Certification Period: 08/18/2022 to  09/29/22    Physician Signature: ________________________________  Date: ______________

## 2022-08-23 ENCOUNTER — PHYSICAL THERAPY (OUTPATIENT)
Dept: PHYSICAL THERAPY | Facility: REHABILITATION | Age: 32
End: 2022-08-23
Payer: COMMERCIAL

## 2022-08-23 DIAGNOSIS — M25.561 ACUTE PAIN OF RIGHT KNEE: ICD-10-CM

## 2022-08-23 PROCEDURE — 97140 MANUAL THERAPY 1/> REGIONS: CPT

## 2022-08-23 PROCEDURE — 97110 THERAPEUTIC EXERCISES: CPT

## 2022-08-23 NOTE — OP THERAPY DAILY TREATMENT
Outpatient Physical Therapy  DAILY TREATMENT     [facility] Physical Therapy [place] [address] [address] [facility], [address]  [place] [address]  Phone:  [phone]    Date: 08/23/2022    Patient: Gil Haskins  YOB: 1990  MRN: [id number]     Time Calculation    Start time: 0415  Stop time: 0500 Time Calculation (min): 45 minutes         Chief Complaint: Back Problem and Knee Problem    Visit #: 2    SUBJECTIVE:  The patient reports having continued pain to the low back and (R) front of the thigh area. He felt more tightness from sitting in his computer chair.      OBJECTIVE:  Current objective measures:       Increase (R) knee AROM in flexion; decrease tightness to (R) hip ITB/TFL      Therapeutic Exercises (CPT 46521):     1. quad stretch (sidelying), 3 x30 sec    2. prone quad stretch, 3 x30 sec    3. hamstring stretches, 2 x30 sec    4. SLR's/ quad sets, 1 x10 reps    5. leg extensions, 1 x 15 reps (green tb)    6. leg press    7. step ups 4-6 weeks    Therapeutic Treatments and Modalities:     1. Manual Therapy (CPT 76243), (R) knee, IASTM    2. E Stim Unattended (CPT 55060)  Time-based treatments/modalities:    Physical Therapy Timed Treatment Charges  Manual therapy minutes (CPT 55332): 15 minutes  Therapeutic exercise minutes (CPT 19386): 30 minutes    ASSESSMENT:   Response to treatment:   IASTM to the (R) ITB/TFL using Hawk  tool for moderate pressure; mild tenderness to the distal aspect of ITB. Patient instructed with self care stretching for (R) quadriceps/hip flexors and hamstrings bilaterally; various positions using stretch strap. Tolerated well.         PLAN/RECOMMENDATIONS:   Plan for treatment: therapy treatment to continue next visit.  Planned interventions for next visit: continue with current treatment.

## 2022-08-25 ENCOUNTER — APPOINTMENT (OUTPATIENT)
Dept: PHYSICAL THERAPY | Facility: REHABILITATION | Age: 32
End: 2022-08-25
Payer: COMMERCIAL

## 2022-08-25 ENCOUNTER — PHYSICAL THERAPY (OUTPATIENT)
Dept: PHYSICAL THERAPY | Facility: REHABILITATION | Age: 32
End: 2022-08-25
Payer: COMMERCIAL

## 2022-08-25 DIAGNOSIS — M25.561 ACUTE PAIN OF RIGHT KNEE: ICD-10-CM

## 2022-08-25 PROCEDURE — 97140 MANUAL THERAPY 1/> REGIONS: CPT

## 2022-08-25 PROCEDURE — 97112 NEUROMUSCULAR REEDUCATION: CPT

## 2022-08-25 PROCEDURE — 97110 THERAPEUTIC EXERCISES: CPT

## 2022-08-25 NOTE — OP THERAPY DAILY TREATMENT
Outpatient Physical Therapy  DAILY TREATMENT     [facility] Outpatient Physical Therapy [address] [facility], [address]  [place] [address]  Phone:  [phone]    Date: 08/25/2022    Patient: Gil Haskins  YOB: 1990  MRN: [id number]     Time Calculation                   Chief Complaint: No chief complaint on file.    Visit #: 3    SUBJECTIVE:  The patient reports that his low back is better; his (R) knee is more painful to the same region. The patient has lingering pain about 2/10 to (R) thigh     OBJECTIVE:  Current objective measures:       Increase mobility and AROM in (R) knee by 5-10 deg; decrease pain    Therapeutic Exercises (CPT 41832):     1. quad stretch (sidelying), 3 x30 sec    2. prone quad stretch, 3 x30 sec    3. hamstring stretches, 2 x30 sec    4. SLR's/ quad sets, 1 x10 reps    5. leg extensions, 2 x 15 reps (green tb)    6. leg press, 5B @ 1 x30 reps double stance; 4B (R) LE @ 1x 20 reps, fatigue response    7. step ups 4-6 weeks, 2 x10 reps    Therapeutic Treatments and Modalities:     1. Manual Therapy (CPT 85660), (R) knee, IASTM to (R) lower quad  Time-based treatments/modalities:       ASSESSMENT:   Response to treatment:   PROM to (R) knee in flexion; connective tissue tightness present at ~135 deg; performed static hip and knee flexion stretches; also hamstring stretches bilaterally. Tolerated well.       PLAN/RECOMMENDATIONS:   Plan for treatment: therapy treatment to continue next visit.  Planned interventions for next visit: continue with current treatment.

## 2022-08-30 ENCOUNTER — PHYSICAL THERAPY (OUTPATIENT)
Dept: PHYSICAL THERAPY | Facility: REHABILITATION | Age: 32
End: 2022-08-30
Payer: COMMERCIAL

## 2022-08-30 DIAGNOSIS — M25.561 ACUTE PAIN OF RIGHT KNEE: ICD-10-CM

## 2022-08-30 PROCEDURE — 97140 MANUAL THERAPY 1/> REGIONS: CPT

## 2022-08-30 PROCEDURE — 97112 NEUROMUSCULAR REEDUCATION: CPT

## 2022-08-30 PROCEDURE — 97110 THERAPEUTIC EXERCISES: CPT

## 2022-08-30 NOTE — OP THERAPY DAILY TREATMENT
"  Outpatient Physical Therapy  DAILY TREATMENT     [facility] Physical Therapy [place] [address] [address] [place], [address]  [place] [address]  Phone:  [phone]    Date: 08/30/2022    Patient: Gil Haskins  YOB: 1990  MRN: [id number]     Time Calculation    Start time: 0415  Stop time: 0500 Time Calculation (min): 45 minutes         Chief Complaint: Knee Problem    Visit #: 4    SUBJECTIVE:  The patient reports having less symptoms to the (R) knee but continues to feel soreness squatting.    OBJECTIVE:  Current objective measures:       Decrease bilateral hamstring tightness    Therapeutic Exercises (CPT 99635):     1. quad stretch (sidelying), 3 x30 sec    2. prone quad stretch, 3 x30 sec    3. hamstring stretches, 2 x30 sec    4. SLR's/ quad sets, 1 x15 reps    5. leg extensions, 2 x 15 reps (green tb)    6. leg press, 5B @ 1 x30 reps double stance; 4B (R) LE @ 1x 20 reps, fatigue response    7. step ups 4-6 weeks, 2 x10 reps    8. alternating lunges, 2 x 15 reps    9. step downs/heel drops over steps 4-6\"    10. Upright bike, seat 8 L4 7: minutes    11. hip abductions, 1 x10 reps (blue tb)    Therapeutic Treatments and Modalities:     1. Manual Therapy (CPT 16612), (R) knee, IASTM to (R) lower quad  Time-based treatments/modalities:    Physical Therapy Timed Treatment Charges  Manual therapy minutes (CPT 80030): 10 minutes  Neuromusc re-ed, balance, coor, post minutes (CPT 69872): 15 minutes  Therapeutic exercise minutes (CPT 04491): 20 minutes    ASSESSMENT:   Response to treatment:   Bilateral hamstring tightness present (R) > (L) side; performed contract relax techniques and static stretches; decreased connective tissue tension to bilateral hamstring muscle groups. Self care instructions for lunging and strengthening hip abductors to (R) LE; patient tolerated each well with fatigue response; needed some support while performing lunges.      PLAN/RECOMMENDATIONS:   Plan for treatment: therapy " treatment to continue next visit.  Planned interventions for next visit: continue with current treatment.

## 2022-09-01 ENCOUNTER — PHYSICAL THERAPY (OUTPATIENT)
Dept: PHYSICAL THERAPY | Facility: REHABILITATION | Age: 32
End: 2022-09-01
Payer: COMMERCIAL

## 2022-09-01 DIAGNOSIS — M25.561 ACUTE PAIN OF RIGHT KNEE: ICD-10-CM

## 2022-09-01 PROCEDURE — 97140 MANUAL THERAPY 1/> REGIONS: CPT

## 2022-09-01 PROCEDURE — 97110 THERAPEUTIC EXERCISES: CPT

## 2022-09-01 PROCEDURE — 97112 NEUROMUSCULAR REEDUCATION: CPT

## 2022-09-01 NOTE — OP THERAPY DAILY TREATMENT
"  Outpatient Physical Therapy  DAILY TREATMENT     [facility] Outpatient Physical Therapy [place] [address] [address] [facility], [address]  [place] [address]  Phone:  [phone]    Date: 09/01/2022    Patient: Gil Haskins  YOB: 1990  MRN: [id number]     Time Calculation    Start time: 0415  Stop time: 0500 Time Calculation (min): 45 minutes         Chief Complaint: Loss Of Balance, Knee Injury, and Knee Problem    Visit #: 5    SUBJECTIVE:  The patient reports some soreness to the (R) knee following exercises. He also mentioned (R) sided low back pain the last day or two.    OBJECTIVE:  Current objective measures:       Decrease soreness to (R) lower quadriceps; improve stability/strength in functional movements stepping     Therapeutic Exercises (CPT 83689):     1. quad stretch (sidelying), 3 x30 sec    2. prone quad stretch, 3 x30 sec    3. hamstring stretches, 2 x30 sec    4. SLR's/ quad sets, 1 x15 reps    5. leg extensions, 2 x 15 reps (blue tb)    6. leg press, 5B @ 1 x30 reps double stance; 4B (R) LE @ 1x 20 reps, fatigue response    7. step ups 4-6 weeks, 2 x10 reps    8. alternating lunges, 2 x 15 reps    9. step downs/heel drops over steps 4-6\", 2 x10 reps    10. Upright bike, seat 8 L4 6: minutes    11. hip abductions, 1 x10 reps (blue tb)    12. bridges, 1 x10 hold 5-10 sec    Therapeutic Treatments and Modalities:     1. Manual Therapy (CPT 54804), (R) knee, IASTM to (R) lower quad    2. Neuromuscular Re-education (CPT 07746), (R) knee, steps, lunges, bridges  Time-based treatments/modalities:    Physical Therapy Timed Treatment Charges  Manual therapy minutes (CPT 29884): 15 minutes  Neuromusc re-ed, balance, coor, post minutes (CPT 03740): 15 minutes  Therapeutic exercise minutes (CPT 58957): 15 minutes  ASSESSMENT:   Response to treatment:   Static stretching and contract relax relax techniques performed with progressive resistance/pressure; patient had decreased sensation of previous tension; tolerated " "well. Self care instruction  over center of gravity balance stepping over 6\"-8\" steps; occasional unsteadiness but no need for support or pain reported.      PLAN/RECOMMENDATIONS:   Plan for treatment: therapy treatment to continue next visit.  Planned interventions for next visit: continue with current treatment.         "

## 2022-09-07 ENCOUNTER — PHYSICAL THERAPY (OUTPATIENT)
Dept: PHYSICAL THERAPY | Facility: REHABILITATION | Age: 32
End: 2022-09-07
Payer: COMMERCIAL

## 2022-09-07 DIAGNOSIS — M25.561 ACUTE PAIN OF RIGHT KNEE: ICD-10-CM

## 2022-09-07 PROCEDURE — 97110 THERAPEUTIC EXERCISES: CPT

## 2022-09-07 PROCEDURE — 97140 MANUAL THERAPY 1/> REGIONS: CPT

## 2022-09-07 PROCEDURE — 97112 NEUROMUSCULAR REEDUCATION: CPT

## 2022-09-07 NOTE — OP THERAPY DAILY TREATMENT
"  Outpatient Physical Therapy  DAILY TREATMENT     [facility] Outpatient Physical Therapy [place] [address] [facility], [address]  [place] [address]  Phone:  [phone]    Date: 09/07/2022    Patient: Gil Haskins  YOB: 1990  MRN: [id number]     Time Calculation    Start time: 0815  Stop time: 0900 Time Calculation (min): 45 minutes         Chief Complaint: Knee Problem    Visit #: 6    SUBJECTIVE:  The patient reports improvement in (R) knee with activity weightbearing and walking      OBJECTIVE:  Current objective measures:       Decrease soreness; improve (R)  quad strength and hip abductor strengthening     Therapeutic Exercises (CPT 17850):     1. quad stretch (sidelying), 3 x30 sec    2. prone quad stretch, 3 x30 sec    3. hamstring stretches, 2 x30 sec    4. SLR's/ quad sets, 1 x15 reps    5. leg extensions, 2 x 15 reps (blue tb)    6. leg press, 5B @ 1 x30 reps double stance; 4B (R) LE @ 1x 20 reps, fatigue response    7. step ups (8\"steps), 2 x10 reps    8. alternating lunges, 2 x 15 reps    9. step downs/heel drops over steps 4-6\", 2 x10 reps    10. Upright bike, seat 8 L4 6: minutes    11. hip abductions, 1 x10 reps (blue tb)    12. bridges, 1 x10 hold 5-10 sec    Therapeutic Treatments and Modalities:     1. Manual Therapy (CPT 89525), (R) knee, IASTM to (R) lower quad    2. Neuromuscular Re-education (CPT 46476), (R) knee, steps, lunges, bridges  Time-based treatments/modalities:    Physical Therapy Timed Treatment Charges  Manual therapy minutes (CPT 77885): 15 minutes  Neuromusc re-ed, balance, coor, post minutes (CPT 52983): 15 minutes  Therapeutic exercise minutes (CPT 54481): 15 minutes    ASSESSMENT:   Response to treatment:   PROM in the (R) and (L) LE in knee flexion; no pain; static stretching to (R) hamstring group; noticeable connective tissue tightness exists; manual contract relax techniques applied to (R) and (L) LE. Less presence of connective tissue tension in hamstrings following " treatment. Patient performed eccentric quad strengthening single leg squats; good control as he progressed with reps over both extremities.      PLAN/RECOMMENDATIONS:   Plan for treatment: therapy treatment to continue next visit.  Planned interventions for next visit: continue with current treatment.

## 2022-09-08 ENCOUNTER — APPOINTMENT (OUTPATIENT)
Dept: PHYSICAL THERAPY | Facility: REHABILITATION | Age: 32
End: 2022-09-08
Payer: COMMERCIAL

## 2022-09-13 ENCOUNTER — APPOINTMENT (OUTPATIENT)
Dept: PHYSICAL THERAPY | Facility: REHABILITATION | Age: 32
End: 2022-09-13
Payer: COMMERCIAL

## 2022-09-14 ENCOUNTER — OFFICE VISIT (OUTPATIENT)
Dept: INTERNAL MEDICINE | Facility: OTHER | Age: 32
End: 2022-09-14
Payer: COMMERCIAL

## 2022-09-14 VITALS
WEIGHT: 238.8 LBS | SYSTOLIC BLOOD PRESSURE: 114 MMHG | DIASTOLIC BLOOD PRESSURE: 75 MMHG | BODY MASS INDEX: 31.65 KG/M2 | HEART RATE: 82 BPM | TEMPERATURE: 97.5 F | OXYGEN SATURATION: 98 % | HEIGHT: 73 IN

## 2022-09-14 DIAGNOSIS — E79.0 HYPERURICEMIA: ICD-10-CM

## 2022-09-14 DIAGNOSIS — E78.1 HYPERTRIGLYCERIDEMIA: ICD-10-CM

## 2022-09-14 DIAGNOSIS — R00.0 TACHYCARDIA: Primary | ICD-10-CM

## 2022-09-14 DIAGNOSIS — Z72.0 TOBACCO USE: ICD-10-CM

## 2022-09-14 DIAGNOSIS — Z23 NEED FOR IMMUNIZATION AGAINST INFLUENZA: ICD-10-CM

## 2022-09-14 DIAGNOSIS — Z28.39 INCOMPLETE IMMUNIZATION STATUS: ICD-10-CM

## 2022-09-14 DIAGNOSIS — Z23 NEED FOR PNEUMOCOCCAL VACCINE: ICD-10-CM

## 2022-09-14 DIAGNOSIS — R91.1 LUNG NODULE: ICD-10-CM

## 2022-09-14 DIAGNOSIS — R73.01 ELEVATED FASTING GLUCOSE: ICD-10-CM

## 2022-09-14 DIAGNOSIS — Z23 NEED FOR IMMUNIZATION USING DIPHTHERIA-TETANUS-PERTUSSIS WITH TYPHOID-PARATYPHOID (DTP + TAB) VACCINE: ICD-10-CM

## 2022-09-14 DIAGNOSIS — E55.9 VITAMIN D INSUFFICIENCY: ICD-10-CM

## 2022-09-14 DIAGNOSIS — R06.2 INSPIRATORY WHEEZE ON EXAMINATION: ICD-10-CM

## 2022-09-14 PROCEDURE — 90471 IMMUNIZATION ADMIN: CPT

## 2022-09-14 PROCEDURE — 90677 PCV20 VACCINE IM: CPT

## 2022-09-14 PROCEDURE — 90686 IIV4 VACC NO PRSV 0.5 ML IM: CPT

## 2022-09-14 PROCEDURE — 99214 OFFICE O/P EST MOD 30 MIN: CPT | Mod: 25,GC

## 2022-09-14 PROCEDURE — 90472 IMMUNIZATION ADMIN EACH ADD: CPT

## 2022-09-14 RX ORDER — NICOTINE 21 MG/24HR
1 PATCH, TRANSDERMAL 24 HOURS TRANSDERMAL EVERY 24 HOURS
Qty: 14 PATCH | Refills: 0 | Status: SHIPPED | OUTPATIENT
Start: 2022-09-29 | End: 2022-10-13

## 2022-09-14 RX ORDER — NICOTINE 21 MG/24HR
1 PATCH, TRANSDERMAL 24 HOURS TRANSDERMAL EVERY 24 HOURS
Qty: 14 PATCH | Refills: 0 | Status: SHIPPED | OUTPATIENT
Start: 2022-09-14 | End: 2022-09-28

## 2022-09-14 ASSESSMENT — FIBROSIS 4 INDEX: FIB4 SCORE: 0.52

## 2022-09-14 NOTE — PROGRESS NOTES
Established Patient    Patient Care Team:  Andie Mark M.D. as PCP - General (Internal Medicine)  Keaton Hudson, PT as Physical Therapist (Physical Therapy)    HPI:  Gil Haskins is a 32 y.o. male who presents today with the following Chief Complaint(s): Follow up for Diagnoses of Tobacco use, Need for immunization against influenza, Need for immunization using diphtheria-tetanus-pertussis with typhoid-paratyphoid (DTP + TAB) vaccine, Need for pneumococcal vaccine, Elevated fasting glucose, Vitamin D insufficiency, Hyperuricemia, Incomplete immunization status, Hypertriglyceridemia, Lung nodule, and Inspiratory wheeze on examination were pertinent to this visit.    Tachycardia-   Patient is here to review their Holter monitor.  Findings had a average heart rate of 97 with a minimum heart rate 55 and the max 175.  He was in sinus rhythm primarily.  He did have 1 episode of SVT that lasted 5 beats with a max rate of 126.  Otherwise no arrhythmias present.   Continues to have episodes of palms sweating, anxiousness, heart palpitations where he can hear heart beat. Symptoms usually happen at night after 7 pm, when he stays up later.    2.Tobacco use-   On nicotine patches. Currently smoking 8 cigs/day. Is wondering what else to do to help quit smoking. Smokes more as the day goes on.     3. Reports history of lung nodule that was biopsied back in [facility] and was previously on inhalers. Currently not having any shortness of breath, cough, or feeling wheezy.     4. HCM  Due for flu, tdap, and pneumonia vaccine.     Reviewed [facility] notable for elevated fasting glucose, hypertriglyceridemia, hyperuricemia. Previously found to be vit D deficient- on supplementation.     ROS:     Denies any new chest pain or shortness of breath.  No changes to urinary or bowel function.   See HPI.      No past medical history on file.  Social History     Tobacco Use    Smoking status: Every Day     Types: Cigarettes  "    Start date: 2009    Smokeless tobacco: Never    Tobacco comments:     5-6 cigarettes daily   Vaping Use    Vaping Use: Never used   Substance Use Topics    Alcohol use: Never    Drug use: Never     Current Outpatient Medications   Medication Sig Dispense Refill    nicotine (NICODERM) 21 MG/24HR PATCH 24 HR Place 1 Patch on the skin every 24 hours for 14 days. 14 Patch 0    [START ON 9/29/2022] nicotine (NICODERM) 14 MG/24HR PATCH 24 HR Place 1 Patch on the skin every 24 hours for 14 days. 14 Patch 0    [START ON 10/14/2022] nicotine (NICODERM) 7 MG/24HR PATCH 24 HR Place 1 Patch on the skin every 24 hours for 14 days. 14 Patch 0    vitamin D3 (CHOLECALCIFEROL) 1000 Unit (25 mcg) Tab Take 1,000 Units by mouth every day.      calcium carbonate (OS-ROLANDO 500) 1250 (500 Ca) MG Tab Take 500 mg by mouth every day.       No current facility-administered medications for this visit.       Physical Exam:  /75 (BP Location: Right arm, Patient Position: Sitting, BP Cuff Size: Adult)   Pulse 82   Temp 36.4 °C (97.5 °F) (Temporal)   Ht 1.854 m (6' 1\")   Wt 108 kg (238 lb 12.8 oz)   SpO2 98%   BMI 31.51 kg/m²   General: Well developed, well nourished male, in no distress.  Eyes: Conjuntiva without any obvious injection or erythema.   Cardiovascular: Heart is regular with out murmur  Lungs: Diffuse inspiratory wheeze throughout. Respiratory effort is normal.  Abd: Soft, non-tender  Ext: No edema      Assessment and Plan:   1. Tachycardia  Recent zio patch reviewed- ST  Normal rate and BP in clinic today.   Question possible nicotine involvement vs panic attacks.   Discussed breathing exercises to try when having episode and recommended breathing apps.   Additionally recommend quitting smoking fully and being off nicotine patches prior to needing to order additional testing.   Patient is in aggreement with this plan. Will plan to follow up in 4-6 weeks to see how smoking cessation is going.     2. Tobacco use  Smoking " cessation counseling done.   Patient would like to try plan of starting higher dose nicotine patch and tapering off. Plan is to be done smoking and off nicotine in 6 weeks.   - nicotine (NICODERM) 21 MG/24HR PATCH 24 HR; Place 1 Patch on the skin every 24 hours for 14 days.  Dispense: 14 Patch; Refill: 0  - nicotine (NICODERM) 14 MG/24HR PATCH 24 HR; Place 1 Patch on the skin every 24 hours for 14 days.  Dispense: 14 Patch; Refill: 0  - nicotine (NICODERM) 7 MG/24HR PATCH 24 HR; Place 1 Patch on the skin every 24 hours for 14 days.  Dispense: 14 Patch; Refill: 0    3. Lung nodule  Reported history of.   Will start with imaging to assess.   - DX-CHEST-2 VIEWS; Future    4. Inspiratory wheeze on examination  Diffuse inspiratory wheeze on exam.   Patient otherwise asymptomatic.   Reports prior inhaler use, though unsure of diagnosis of asthma.   - PULMONARY FUNCTION TESTS -Test requested: Complete Pulmonary Function Test; Future    5. Elevated fasting glucose  Noted on recent labs.   Will check A1c  Follow up results  - HEMOGLOBIN A1C; Future    6. Hypertriglyceridemia  Previously elevated  Lifestyle modification counseling given  Follow up repeat labs  - Lipid Profile; Future    7. Hyperuricemia  Previously elevated.   Last gout attack ~ 1 month ago.  Not currently on medication  Repeat lab to assess  Consider starting allopurinol at next visit  - URIC ACID; Future    8. Vitamin D insufficiency  Previously deficient on lab  On supplementation. 1000 IU daily   Repeat lab, follow up result and will make adjustments accordingly.    - VITAMIN D,25 HYDROXY (DEFICIENCY); Future    9. Need for immunization against influenza  Done today  - Influenza Vaccine Quad Injection (PF)    10. Need for immunization using diphtheria-tetanus-pertussis with typhoid-paratyphoid (DTP + TAB) vaccine  Out of tdap in office  Is due for Tdap  Recommend patient get at the pharmacy of his choice    11. Need for pneumococcal vaccine  Done today  -  Pneumococcal Conjugate Vaccine 20-Valent (19 yrs+)    12. Incomplete immunization status  Patient unsure if ever received Hep B series. Will check titer.  - HEP B CORE AB TOTAL; Future        Return in about 6 weeks (around 10/24/2022) for Long, labs, PFTs, cxr, smoking.      Andie Mark M.D. PGY2  [facility]

## 2022-09-14 NOTE — PATIENT INSTRUCTIONS
Is good to see you today Gil!    Today we went over your recent Zio patch test.  It did show sinus tachycardia, however was  unremarkable for any concerning arrhythmia or issue.    It is possible that your fast heart rate is due to nicotine use.  We have a plan for this.  Lets plan to follow-up in approximately 6 weeks to see how things are going with quitting smoking.    Have also sent in other labs to follow-up on previous abnormalities including your vitamin D, uric acid, and lipid panel.    You received your pneumonia and influenza vaccines today.  You are due for your Tdap, unfortunately we were out of this in the office today.  You can have this done at the pharmacy of your choice at your convenience.    Have also sent in for a chest x-ray and pulmonary function testing given your history of lung abnormality.    Lets plan to follow-up in approximately 6 weeks to review the above studies and check in on smoking cessation, sooner if needed.

## 2022-09-14 NOTE — PROGRESS NOTES
Established Patient    Patient Care Team:  Brooke Thorne M.D. as PCP - General (Internal Medicine)  Keaton Hudson, PT as Physical Therapist (Physical Therapy)    HPI:  Gil Haskins is a 32 y.o. male who presents today with the following Chief Complaint(s): Follow up for There were no encounter diagnoses.    Patient is here to review their Holter monitor.  Findings had a average heart rate of 97 with a minimum heart rate 55 and the max 175.  He was in sinus rhythm primarily.  He did have 1 episode of SVT that lasted 5 beats with a max rate of 126.  Otherwise no arrhythmias present.    2. Shoulder pain          ROS:     Denies any new chest pain or shortness of breath.  No changes to urinary or bowel function. ***  See HPI.  ROS      No past medical history on file.  Social History     Tobacco Use    Smoking status: Every Day     Types: Cigarettes     Start date: 2009    Smokeless tobacco: Never    Tobacco comments:     5-6 cigarettes daily   Vaping Use    Vaping Use: Never used   Substance Use Topics    Alcohol use: Never    Drug use: Never     Current Outpatient Medications   Medication Sig Dispense Refill    vitamin D3 (CHOLECALCIFEROL) 1000 Unit (25 mcg) Tab Take 1,000 Units by mouth every day.      calcium carbonate (OS-ROLANDO 500) 1250 (500 Ca) MG Tab Take 500 mg by mouth every day.      nicotine (NICODERM) 14 MG/24HR PATCH 24 HR Place 1 Patch on the skin every 24 hours. 28 Patch 2     No current facility-administered medications for this visit.       Physical Exam:  There were no vitals taken for this visit.  General: Well developed, well nourished male, in no distress.  Eyes: Conjuntiva without any obvious injection or erythema.   Cardiovascular: Heart is regular with *** murmur  Lungs: Clear to auscultation bilaterally. No wheezes, rhonci or crackles heard. Respiratory effort is normal.  Abd: Soft, non-tender  Ext: No edema  Physical Exam   ***    Assessment and Plan:   Problem List Items Addressed  This Visit    None      No follow-ups on file.      Andie Mark M.D. PGY1  [facility]

## 2022-09-15 ENCOUNTER — APPOINTMENT (OUTPATIENT)
Dept: PHYSICAL THERAPY | Facility: REHABILITATION | Age: 32
End: 2022-09-15
Payer: COMMERCIAL

## 2022-09-15 PROBLEM — R06.2 INSPIRATORY WHEEZE ON EXAMINATION: Status: ACTIVE | Noted: 2022-09-15

## 2022-09-15 PROBLEM — R91.1 LUNG NODULE: Status: ACTIVE | Noted: 2022-09-15

## 2022-09-15 PROBLEM — R73.01 ELEVATED FASTING GLUCOSE: Status: ACTIVE | Noted: 2022-09-15

## 2022-09-23 ENCOUNTER — PHYSICAL THERAPY (OUTPATIENT)
Dept: PHYSICAL THERAPY | Facility: REHABILITATION | Age: 32
End: 2022-09-23
Payer: COMMERCIAL

## 2022-09-23 DIAGNOSIS — M25.561 ACUTE PAIN OF RIGHT KNEE: ICD-10-CM

## 2022-09-23 PROCEDURE — 97140 MANUAL THERAPY 1/> REGIONS: CPT

## 2022-09-23 PROCEDURE — 97110 THERAPEUTIC EXERCISES: CPT

## 2022-09-23 PROCEDURE — 97112 NEUROMUSCULAR REEDUCATION: CPT

## 2022-09-23 NOTE — OP THERAPY DAILY TREATMENT
"  Outpatient Physical Therapy  DAILY TREATMENT     [facility] Outpatient Physical Therapy [place] [address] [facility], [address]  [place] [address]  Phone:  [phone]    Date: 09/23/2022    Patient: Gil Haskins  YOB: 1990  MRN: [id number]     Time Calculation    Start time: 0800  Stop time: 0845 Time Calculation (min): 45 minutes         Chief Complaint: Knee Problem    Visit #: 7    SUBJECTIVE:  The patient reports some back pain to the (L) but he is noticing less than when he had originally. He sits a lot but gets up 1-2 x per hour. Pain     OBJECTIVE:  Current objective measures:       Improve strength in (R) LE during stepping and lunging     Therapeutic Exercises (CPT 12637):     1. quad stretch (sidelying), 3 x30 sec    2. prone quad stretch, 3 x30 sec    3. hamstring stretches, 2 x30 sec    4. SLR's/ quad sets, 1 x10 reps @ 10#    5. leg extensions, 2 x 15 reps (blue tb)    6. leg press, 5B @ 1 x30 reps double stance; 4B (R) LE @ 1x 20 reps, fatigue response    7. step ups (8\"steps), 2 x10 reps    8. alternating lunges, 2 x 15 reps    9. step downs/heel drops over steps 4-6\", 2 x10 reps    10. Upright bike, seat 8 L4  7 minutes    11. hip abductions, 1 x10 reps (blue tb)    12. bridges, 1 x10 hold 5-10 sec    13. SLS squats, 12x    14. trunk extension in prone, 10 x    15. superman alt UE/LE, 12x    Therapeutic Treatments and Modalities:     1. Manual Therapy (CPT 55832), (R) knee, IASTM to (R) lower quad    2. Neuromuscular Re-education (CPT 34739), (R) knee, steps, lunges, bridges  Time-based treatments/modalities:    Physical Therapy Timed Treatment Charges  Manual therapy minutes (CPT 20687): 10 minutes  Neuromusc re-ed, balance, coor, post minutes (CPT 03358): 15 minutes  Therapeutic exercise minutes (CPT 35460): 20 minutes      ASSESSMENT:   Response to treatment:   IASTM to the lower quadriceps; very mild tenderness to superior of patella and lateral VMO compared to past sessions using Hawk  " tool.      PLAN/RECOMMENDATIONS:   Plan for treatment: therapy treatment to continue next visit.  Planned interventions for next visit: continue with current treatment.

## 2022-09-26 NOTE — OP THERAPY DAILY TREATMENT
"  Outpatient Physical Therapy  DAILY TREATMENT     [facility] Physical Therapy [place] [address] [address] [facility], [address]  [place] [address]  Phone:  [phone]    Date: 09/27/2022    Patient: Gil Haskins  YOB: 1990  MRN: [id number]     Time Calculation    Start time: 0800  Stop time: 0845 Time Calculation (min): 45 minutes         Chief Complaint: Knee Problem    Visit #: 8    SUBJECTIVE:  The patient reports the (R) knee is head ng the right direction. The (L) knee hurt over the next day after 2 days after leaving therapy  maybe overworked the (L) knee.    OBJECTIVE:  Current objective measures:       Increase (R) quad strength decrease pain with functional training    Therapeutic Exercises (CPT 49085):     1. quad stretch (sidelying), 3 x30 sec    2. prone quad stretch, 3 x30 sec    3. hamstring stretches, 2 x30 sec    4. SLR's/ quad sets, 2 x10 reps @ 10#    5. leg extensions, 2 x 15 reps (blue tb)    6. leg press, 5B @ 1 x30 reps double stance; 4B (R) LE @ 1x 20 reps, fatigue response    7. step ups (8\"steps), 2 x10 reps    8. alternating lunges, 2 x 15 reps    9. step downs/heel drops over steps 4-6\", 2 x10 reps    10. Upright bike, seat 8 L4.5  7 minutes    11. hip abductions, 1 x10 reps (blue tb)    12. bridges, 1 x10 hold 5-10 sec    13. SLS squats, 12x    14. trunk extension in prone, 10 x    15. superman alt UE/LE, 12x    16. hamstring curls, 1 x12 reps (blue tb)    Therapeutic Treatments and Modalities:     1. Manual Therapy (CPT 32911), (R) knee, IASTM to (R) lower quad    2. Neuromuscular Re-education (CPT 93326), (R) knee, steps, lunges, bridges  Time-based treatments/modalities:    Physical Therapy Timed Treatment Charges  Neuromusc re-ed, balance, coor, post minutes (CPT 87335): 15 minutes  Therapeutic exercise minutes (CPT 84114): 30 minutes    ASSESSMENT:   Response to treatment:   Decrease connective tension to bilateral hamstrings static and dynamic stretching contract relax " techniques; (R) hamstring more restricted than (L) but patient felt less tension following treatment. Instructed on core strengthening with bridges with hip fallouts; good motor control; no back pain. Next visit: functional training      PLAN/RECOMMENDATIONS:   Plan for treatment: therapy treatment to continue next visit.  Planned interventions for next visit: continue with current treatment.

## 2022-09-27 ENCOUNTER — PHYSICAL THERAPY (OUTPATIENT)
Dept: PHYSICAL THERAPY | Facility: REHABILITATION | Age: 32
End: 2022-09-27
Payer: COMMERCIAL

## 2022-09-27 DIAGNOSIS — M25.561 ACUTE PAIN OF RIGHT KNEE: ICD-10-CM

## 2022-09-27 PROCEDURE — 97110 THERAPEUTIC EXERCISES: CPT

## 2022-09-27 PROCEDURE — 97112 NEUROMUSCULAR REEDUCATION: CPT

## 2022-09-29 NOTE — OP THERAPY DAILY TREATMENT
"  Outpatient Physical Therapy  DAILY TREATMENT     [facility] Outpatient Physical Therapy [place] [address], [address]  [place] [address]  Phone:  [phone]    Date: 09/30/2022    Patient: Gil Haskins  YOB: 1990  MRN: [id number]     Time Calculation    Start time: 0800  Stop time: 0845 Time Calculation (min): 45 minutes         Chief Complaint: Knee Problem    Visit #: 9    SUBJECTIVE:  The patient reports driving a while using the (R) leg but he is not having as many symptoms .     OBJECTIVE:  Current objective measures:       Improve strengthening in (R) quadriceps; functional lunging and kneeling with static and dynamic stability     Therapeutic Exercises (CPT 60609):     1. quad stretch (sidelying), 3 x30 sec    2. prone quad stretch, 3 x30 sec    3. hamstring stretches, 2 x30 sec    4. SLR's/ quad sets, 2 x10 reps @ 10#    5. leg extensions, 2 x 15 reps (blue tb)    6. leg press, 7B @ 1 x30 reps double stance; 4B (R) LE @ 1x 20 reps    7. step ups (8\"steps), 2 x10 reps    8. alternating lunges, 2 x 15 reps    9. eccentric quads    10. Upright bike, seat 8 L4.5  6 minutes    11. hip abductions, 1 x10 reps (blue tb)    12. bridges, 1 x10 hold 5-10 sec    13. SLS squats, 12x    14. trunk extension in prone, 10 x    15. superman alt UE/LE, 12x    16. hamstring curls, 1 x12 reps (blue tb)    Therapeutic Treatments and Modalities:     1. Manual Therapy (CPT 00653), (R) knee, IASTM to (R) lower quad, PROM in (R) knee with static and contract relax techniques    2. Neuromuscular Re-education (CPT 82414), (R) knee, steps, lunges, bridges  Time-based treatments/modalities:    Physical Therapy Timed Treatment Charges  Neuromusc re-ed, balance, coor, post minutes (CPT 75319): 15 minutes  Therapeutic exercise minutes (CPT 24857): 30 minutes      ASSESSMENT:   Response to treatment:   Continues to work on stabilization and strengthening with (R) LE stepping without pain and lunges to fatigue response in " LE's  He also has indirect affect from low back pain on occasion during activity.     PLAN/RECOMMENDATIONS:   Plan for treatment: therapy treatment to continue next visit.  Planned interventions for next visit: continue with current treatment.

## 2022-09-30 ENCOUNTER — PHYSICAL THERAPY (OUTPATIENT)
Dept: PHYSICAL THERAPY | Facility: REHABILITATION | Age: 32
End: 2022-09-30
Payer: COMMERCIAL

## 2022-09-30 DIAGNOSIS — M25.561 ACUTE PAIN OF RIGHT KNEE: ICD-10-CM

## 2022-09-30 PROCEDURE — 97110 THERAPEUTIC EXERCISES: CPT

## 2022-09-30 PROCEDURE — 97112 NEUROMUSCULAR REEDUCATION: CPT

## 2022-10-04 ENCOUNTER — PHYSICAL THERAPY (OUTPATIENT)
Dept: PHYSICAL THERAPY | Facility: REHABILITATION | Age: 32
End: 2022-10-04
Payer: COMMERCIAL

## 2022-10-04 DIAGNOSIS — M25.561 ACUTE PAIN OF RIGHT KNEE: ICD-10-CM

## 2022-10-04 PROCEDURE — 97140 MANUAL THERAPY 1/> REGIONS: CPT

## 2022-10-04 PROCEDURE — 97110 THERAPEUTIC EXERCISES: CPT

## 2022-10-04 PROCEDURE — 97112 NEUROMUSCULAR REEDUCATION: CPT

## 2022-10-04 NOTE — OP THERAPY DAILY TREATMENT
"  Outpatient Physical Therapy  DAILY TREATMENT     [facility] Outpatient Physical Therapy [address] [facility], [address]  [place] [address]  Phone:  [phone]    Date: 10/04/2022    Patient: Gil Haskins  YOB: 1990  MRN: [id number]     Time Calculation                   Chief Complaint: No chief complaint on file.    Visit #: 10    SUBJECTIVE:  The patient reports (R) knee pain is back but overall it's getting better.    OBJECTIVE:  Current objective measures:       Increase quad strength bending to floor lifting from ground levels boxes    Therapeutic Exercises (CPT 12869):     1. quad stretch (sidelying), 3 x30 sec    2. prone quad stretch, 3 x30 sec    3. hamstring stretches, 2 x30 sec    4. SLR's/ quad sets, 2 x10 reps @ 10#    5. leg extensions, 2 x 20 reps (blue tb)    6. leg press, 7B @ 1 x30 reps double stance; 4B (R) LE @ 1x 20 reps    7. step ups (8\"steps), 2 x10 reps    8. alternating lunges, 2 x 15 reps    9. eccentric quads, 2 x10 reps (blue tb), 22.5 \" seat height    10. Upright bike, seat 8 L4.5  6 minutes    11. hip abductions, 1 x10 reps (blue tb)    12. bridges, 1 x10 hold 5-10 sec    13. SLS squats, 12x    14. trunk extension in prone, 10 x    15. superman alt UE/LE, 12x    16. hamstring curls, 1 x12 reps (blue tb)    Therapeutic Treatments and Modalities:     1. Manual Therapy (CPT 85366), (R) knee, Kinesiotaping to (R) patellar for tracking medially    2. Neuromuscular Re-education (CPT 57203), (R) knee, steps, lunges, bridges  Time-based treatments/modalities:    ASSESSMENT:   Response to treatment:   Kinesio-taping applied to the (R) knee with protective taping underneath; Patient performed eccentric quadriceps SL stance squats; some pain to (R) knee at lower levels of seat height; adjusted to less pain to the (R) knee.      PLAN/RECOMMENDATIONS:   Plan for treatment: therapy treatment to continue next visit.  Planned interventions for next visit: continue with current " treatment.

## 2022-10-06 ENCOUNTER — PHYSICAL THERAPY (OUTPATIENT)
Dept: PHYSICAL THERAPY | Facility: REHABILITATION | Age: 32
End: 2022-10-06
Payer: COMMERCIAL

## 2022-10-06 DIAGNOSIS — M25.561 ACUTE PAIN OF RIGHT KNEE: ICD-10-CM

## 2022-10-06 PROCEDURE — 97112 NEUROMUSCULAR REEDUCATION: CPT

## 2022-10-06 PROCEDURE — 97110 THERAPEUTIC EXERCISES: CPT

## 2022-10-06 NOTE — OP THERAPY DAILY TREATMENT
"  Outpatient Physical Therapy  DAILY TREATMENT     [facility] Outpatient Physical Therapy [place] [address] [facility], [address]  [place] [address]  Phone:  [phone]    Date: 10/06/2022    Patient: Gil Haskins  YOB: 1990  MRN: [id number]     Time Calculation    Start time: 0330  Stop time: 0415 Time Calculation (min): 45 minutes         Chief Complaint: Knee Problem    Visit #: 11    SUBJECTIVE:  The patient reports having no increased soreness but continued     OBJECTIVE:  Current objective measures:       Improve stabilization over (R) LE; kinesiotaping bilateral patellas    Therapeutic Exercises (CPT 48989):     1. quad stretch (sidelying), 3 x30 sec    2. prone quad stretch, 3 x30 sec    3. hamstring stretches, 2 x30 sec    4. SLR's/ quad sets, 2 x10 reps @ 10#    5. leg extensions, 2 x 20 reps (blue tb)    6. leg press, 7B @ 1 x30 reps double stance; 4B (R) LE @ 1x 20 reps    7. step ups (8\"steps), 2 x10 reps    8. alternating lunges, 2 x 15 reps    9. eccentric quads, 2 x10 reps (blue tb), 22.5 \" seat height    10. Upright bike, seat 8 L4.5  7 minutes    11. hip abductions, 1 x10 reps (blue tb)    12. bridges, 1 x10 hold 5-10 sec    13. SLS squats, 12x    14. trunk extension in prone, 10 x    15. superman alt UE/LE, 12x    16. hamstring curls, 1 x12 reps (blue tb)    Therapeutic Treatments and Modalities:     1. Manual Therapy (CPT 86089), (R) knee, Kinesiotaping to (R) patellar for tracking medially    2. Neuromuscular Re-education (CPT 46456), (R) knee, steps, lunges, bridges  Time-based treatments/modalities:    Physical Therapy Timed Treatment Charges  Neuromusc re-ed, balance, coor, post minutes (CPT 69550): 15 minutes  Therapeutic exercise minutes (CPT 87985): 30 minutes  ASSESSMENT:   Response to treatment:   Kinesio-taping (R) patella using pre-tape and kinesio in semi-Inupiat pattern medial and lateral to patella. Patient responded with less pain during SLS 1/4 squat positions. Lunges were " performed correctly following cuing to turn away from the supportive leg  during lunge.       PLAN/RECOMMENDATIONS:   Plan for treatment: therapy treatment to continue next visit.  Planned interventions for next visit: continue with current treatment.

## 2022-10-10 ENCOUNTER — APPOINTMENT (OUTPATIENT)
Dept: PHYSICAL THERAPY | Facility: REHABILITATION | Age: 32
End: 2022-10-10
Payer: COMMERCIAL

## 2022-10-11 ENCOUNTER — PHYSICAL THERAPY (OUTPATIENT)
Dept: PHYSICAL THERAPY | Facility: REHABILITATION | Age: 32
End: 2022-10-11
Payer: COMMERCIAL

## 2022-10-11 DIAGNOSIS — M25.561 ACUTE PAIN OF RIGHT KNEE: ICD-10-CM

## 2022-10-11 PROCEDURE — 97110 THERAPEUTIC EXERCISES: CPT

## 2022-10-11 PROCEDURE — 97112 NEUROMUSCULAR REEDUCATION: CPT

## 2022-10-11 NOTE — OP THERAPY DAILY TREATMENT
"  Outpatient Physical Therapy  DAILY TREATMENT     [facility] Physical Therapy [place] [address] [facility], [address]  [place] [address]  Phone:  [phone]    Date: 10/11/2022    Patient: Gil Haskins  YOB: 1990  MRN: [id number]     Time Calculation    Start time: 0415  Stop time: 0500 Time Calculation (min): 45 minutes         Chief Complaint: Knee Problem    Visit #: 12    SUBJECTIVE:  The patient reports bilateral knee pain after last treatment session; patient believes he may have done to much work.    OBJECTIVE:  Current objective measures:       Improve (R) quadriceps strength; stability     Therapeutic Exercises (CPT 24544):     1. Asher deadlifts, 10#    2. prone quad stretch, 3 x30 sec    3. hamstring stretch, 2 x30 sec    4. SLR's/ quad sets, 2 x10 reps @ 10#    5. leg extensions, 2 x 25 reps (blue tb)    6. leg press, 7B @ 1 x30 reps double stance; 4B (R) LE @ 1x 20 reps    7. step ups (8\"steps), 2 x10 reps, with weights 10#    8. alternating lunges, 2 x 15 reps    9. eccentric quads, 2 x10 reps (blue tb), 22.5 \" seat height    10. Upright bike, seat 8  L4.5  6 minutes    11. hip abductions, 2 x20 reps (blue tb)    12. bridges with ball, 1 x10 hold 5-10 sec    13. SLS squats, 12x    14. trunk extension in prone, 10 x    15. superman alt UE/LE, 12x    16. hamstring curls, 2 x 15 (R)  2x 10 (L)  (blue tb)    17. monster walks, 4 x20 feet (blue tb)    18. foam roller, 2-3'    Therapeutic Treatments and Modalities:     1. Manual Therapy (CPT 56031), (R) knee, Kinesiotaping to (R) patellar for tracking medially    2. Neuromuscular Re-education (CPT 14839), (R) knee, steps, lunges, bridges  Time-based treatments/modalities:    Physical Therapy Timed Treatment Charges  Neuromusc re-ed, balance, coor, post minutes (CPT 36103): 15 minutes  Therapeutic exercise minutes (CPT 32391): 30 minutes      ASSESSMENT:   Response to treatment:   Progressive strengthening with increased weight and resistance " with hip abductions and leg extensions; worked to a fatigue response. Patient had less pain to the knees following treatment today; he reports that he feels that he is getting better overall since starting therapy        PLAN/RECOMMENDATIONS:   Plan for treatment: therapy treatment to continue next visit.  Planned interventions for next visit: continue with current treatment.

## 2022-10-12 ENCOUNTER — HOSPITAL ENCOUNTER (OUTPATIENT)
Dept: LAB | Facility: MEDICAL CENTER | Age: 32
End: 2022-10-12
Payer: COMMERCIAL

## 2022-10-12 DIAGNOSIS — R73.01 ELEVATED FASTING GLUCOSE: ICD-10-CM

## 2022-10-12 DIAGNOSIS — E79.0 HYPERURICEMIA: ICD-10-CM

## 2022-10-12 DIAGNOSIS — Z28.39 INCOMPLETE IMMUNIZATION STATUS: ICD-10-CM

## 2022-10-12 DIAGNOSIS — E55.9 VITAMIN D INSUFFICIENCY: ICD-10-CM

## 2022-10-12 DIAGNOSIS — E78.1 HYPERTRIGLYCERIDEMIA: ICD-10-CM

## 2022-10-12 LAB
25(OH)D3 SERPL-MCNC: 38 NG/ML (ref 30–100)
CHOLEST SERPL-MCNC: 178 MG/DL (ref 100–199)
EST. AVERAGE GLUCOSE BLD GHB EST-MCNC: 123 MG/DL
FASTING STATUS PATIENT QL REPORTED: NORMAL
HBA1C MFR BLD: 5.9 % (ref 4–5.6)
HBV CORE AB SERPL QL IA: NONREACTIVE
HDLC SERPL-MCNC: 40 MG/DL
LDLC SERPL CALC-MCNC: 83 MG/DL
TRIGL SERPL-MCNC: 277 MG/DL (ref 0–149)
URATE SERPL-MCNC: 9.6 MG/DL (ref 2.5–8.3)

## 2022-10-12 PROCEDURE — 83036 HEMOGLOBIN GLYCOSYLATED A1C: CPT

## 2022-10-12 PROCEDURE — 36415 COLL VENOUS BLD VENIPUNCTURE: CPT

## 2022-10-12 PROCEDURE — 84550 ASSAY OF BLOOD/URIC ACID: CPT

## 2022-10-12 PROCEDURE — 82306 VITAMIN D 25 HYDROXY: CPT

## 2022-10-12 PROCEDURE — 86704 HEP B CORE ANTIBODY TOTAL: CPT

## 2022-10-12 PROCEDURE — 80061 LIPID PANEL: CPT

## 2022-10-13 ENCOUNTER — PHYSICAL THERAPY (OUTPATIENT)
Dept: PHYSICAL THERAPY | Facility: REHABILITATION | Age: 32
End: 2022-10-13
Payer: COMMERCIAL

## 2022-10-13 DIAGNOSIS — M25.561 ACUTE PAIN OF RIGHT KNEE: ICD-10-CM

## 2022-10-13 PROCEDURE — 97140 MANUAL THERAPY 1/> REGIONS: CPT

## 2022-10-13 PROCEDURE — 97112 NEUROMUSCULAR REEDUCATION: CPT

## 2022-10-13 PROCEDURE — 97110 THERAPEUTIC EXERCISES: CPT

## 2022-10-13 NOTE — RESULT ENCOUNTER NOTE
Reviewed recent labs. Elevated triglycerides though improved from prior. Persistently elevated uric acid. Hgb A1c 5.9. Nothing that needs to be addressed urgently and will plan to go over results with patient at our next scheduled visit on 10/26.

## 2022-10-13 NOTE — OP THERAPY DAILY TREATMENT
"  Outpatient Physical Therapy  DAILY TREATMENT     [facility] Physical Therapy [place] [address] [address] [facility], [address]  [place] [address]  Phone:  [phone]    Date: 10/13/2022    Patient: Gil Haskins  YOB: 1990  MRN: [id number]     Time Calculation    Start time: 0800  Stop time: 0845 Time Calculation (min): 45 minutes         Chief Complaint: No chief complaint on file.    Visit #: 13    SUBJECTIVE:  The patient reports having less pain from using the kinesio-tape during the time he used it.    OBJECTIVE:  Current objective measures:       Decrease pain t (R) knee; improve strength stepping and lunging     Therapeutic Exercises (CPT 27822):     1. Asher deadlifts, 10#    2. prone quad stretch, 3 x30 sec    3. hamstring stretch, 2 x30 sec    4. SLR's/ quad sets, 2 x10 reps @ 10#    5. leg extensions, 2 x 25 reps (blue tb)    6. leg press, 7B @ 1 x30 reps double stance; 4B (R) LE @ 1x 20 reps    7. step ups (8\"steps), 2 x10 reps, with weights 10#    8. alternating lunges, 2 x 20 reps    9. eccentric quads, 2 x10 reps (, 22.5 \" seat height    10. Upright bike, seat 8  L4.5  6 minutes    11. hip abductions, 2 x20 reps (blue tb)    12. bridges with ball, 1 x10 hold 5-10 sec    13. SLS squats, 12x    14. trunk extension in prone, 10 x    15. superman alt UE/LE, 12x    16. hamstring curls, 2 x 15 (R)  2x 10 (L)  (blue tb)    17. monster walks, 4 x20 feet (blue tb)    18. foam roller, 2-3'    Therapeutic Treatments and Modalities:     1. Manual Therapy (CPT 57263), (R) knee, Kinesiotaping to (R) patellar for tracking medially    2. Neuromuscular Re-education (CPT 00278), (R) knee, steps, lunges, bridges  Time-based treatments/modalities:    Physical Therapy Timed Treatment Charges  Manual therapy minutes (CPT 49553): 10 minutes  Neuromusc re-ed, balance, coor, post minutes (CPT 72328): 20 minutes  Therapeutic exercise minutes (CPT 35411): 15 minutes    ASSESSMENT:   Response to treatment:   PROM in " "hamstring lengthening in hip flexion. Patient had increased resistance in (R) hip but less following treatment.   Patient had difficulty with maintaining balance over (R) LE during high stepping over 6-8\" block. Patient performed lunges with torso twists; good motor control balance without knee pain. He is demonstrating more endurance with longer stints of distance during lunges with less fatigue for the distance covered     PLAN/RECOMMENDATIONS:   Plan for treatment: therapy treatment to continue next visit.  Planned interventions for next visit: continue with current treatment.         "

## 2022-10-18 ENCOUNTER — APPOINTMENT (OUTPATIENT)
Dept: PHYSICAL THERAPY | Facility: REHABILITATION | Age: 32
End: 2022-10-18
Payer: COMMERCIAL

## 2022-10-19 NOTE — OP THERAPY DAILY TREATMENT
"  Outpatient Physical Therapy  DAILY TREATMENT     [facility] Outpatient Physical Therapy [address] [facility] [place] [address]  [place] [address]  Phone:  [phone]    Date: 10/20/2022    Patient: Gil Haskins  YOB: 1990  MRN: [id number]     Time Calculation    Start time: 0800  Stop time: 0845 Time Calculation (min): 45 minutes         Chief Complaint: Knee Problem    Visit #: 14    SUBJECTIVE:  The patient reports having some pain in his (R) knee following last treatment, but recovered after last treatment the next day. The patient reports 70% better since starting therapy. The patient has onto experienced much pain lately driving lately. The patient would like to set u a goal of no pain after squatting several times with no pain.    OBJECTIVE:  Current objective measures:       Increase balance statically and dynamically over variable surfaces    Therapeutic Exercises (CPT 41898):     1. Asher deadlifts, 10#  xxxx    2. prone quad stretch, 3 x30 sec    3. hamstring stretch, 2 x30 sec    4. SLR's/ quad sets, NT    5. leg extensions, 2 x 25 reps (blue tb)    6. leg press, 7B @ 1 x30 reps double stance; 4B (R) LE @ 1x 20 reps    7. step ups (8\"steps), 2 x10 reps, with weights 20#  x    8. alternating lunges, 2 x 20 reps    9. eccentric quads, 2 x10 reps, 22.5 \" seat height    10. Upright bike, seat 8  L4.5  6 minutes    11. hip abductions, 2 x20 reps (blue tb)    12. bridges with ball, 1 x10 hold 5-10 sec, x    13. SLS squats, 12x    14. trunk extension in prone, 10 x    15. superman alt UE/LE, 12x    16. hamstring curls, 2 x 15 (R)  2x 10 (L)  (blue tb)    17. monster walks, 4 x20 feet (blue tb), x    18. foam roller, 2-3'    19. BOSU squats, 1 [id number], xxxx    20. blue discs, xxxxxx    Therapeutic Treatments and Modalities:     1. Manual Therapy (CPT 27145), (R) knee, Kinesiotaping to (R) patellar for tracking medially    2. Neuromuscular Re-education (CPT 22420), (R) knee, steps, lunges, " mj  Time-based treatments/modalities:    Physical Therapy Timed Treatment Charges  Manual therapy minutes (CPT 91971): 10 minutes  Neuromusc re-ed, balance, coor, post minutes (CPT 48566): 20 minutes  Therapeutic exercise minutes (CPT 54203): 15 minutes      ASSESSMENT:   Response to treatment:   Performed BOSU squats over platform; patient initially had difficulty maintaining balance over platform and finding his center of gravity; tactile and verbal cuing used to help patient with correcting into proper posture using hip hinge movements; less trunk flexion and les low back forces. Patient tolerated well with no pain.      PLAN/RECOMMENDATIONS:   Plan for treatment: therapy treatment to continue next visit.  Planned interventions for next visit: continue with current treatment.

## 2022-10-20 ENCOUNTER — PHYSICAL THERAPY (OUTPATIENT)
Dept: PHYSICAL THERAPY | Facility: REHABILITATION | Age: 32
End: 2022-10-20
Payer: COMMERCIAL

## 2022-10-20 DIAGNOSIS — M25.561 ACUTE PAIN OF RIGHT KNEE: ICD-10-CM

## 2022-10-20 PROCEDURE — 97110 THERAPEUTIC EXERCISES: CPT

## 2022-10-20 PROCEDURE — 97140 MANUAL THERAPY 1/> REGIONS: CPT

## 2022-10-20 PROCEDURE — 97112 NEUROMUSCULAR REEDUCATION: CPT

## 2022-10-25 ENCOUNTER — PHYSICAL THERAPY (OUTPATIENT)
Dept: PHYSICAL THERAPY | Facility: REHABILITATION | Age: 32
End: 2022-10-25
Payer: COMMERCIAL

## 2022-10-25 DIAGNOSIS — M25.561 ACUTE PAIN OF RIGHT KNEE: ICD-10-CM

## 2022-10-25 PROCEDURE — 97110 THERAPEUTIC EXERCISES: CPT

## 2022-10-25 PROCEDURE — 97140 MANUAL THERAPY 1/> REGIONS: CPT

## 2022-10-25 PROCEDURE — 97112 NEUROMUSCULAR REEDUCATION: CPT

## 2022-10-25 NOTE — OP THERAPY DAILY TREATMENT
"  Outpatient Physical Therapy  DAILY TREATMENT     [facility] Outpatient Physical Therapy [place]  [employer] [facility], [address]  [place] [address]  Phone:  [phone]    Date: 10/25/2022    Patient: Gil Haskins  YOB: 1990  MRN: [id number]     Time Calculation    Start time: 0800  Stop time: 0845 Time Calculation (min): 45 minutes         Chief Complaint: Knee Problem    Visit #: 15    SUBJECTIVE:  The patient says he has not had very much pain to his (R) knee while active with  bending and being on it for extended periods.    OBJECTIVE:  Current objective measures:       Increase ground level transitions; kneeling bending at the (R) knee    Therapeutic Exercises (CPT 19619):     1. Asher deadlifts, 10#  xxxx    2. prone quad stretch, 3 x30 sec, x    3. hamstring stretch, 2 x30 sec, x    4. bridges, 1 x10 reps, x    5. leg extensions, 2 x 25 reps (blue tb)    6. leg press, 7B @ 1 x30 reps double stance; 4B (R) LE @ 1x 20 reps    7. step ups (8\"steps), 2 x10 reps, with weights 20#  x    8. alternating lunges, 2 x 20 reps    9. eccentric quads, 2 x10 reps, 22.5 \" seat height x    10. Upright bike, seat 8  L4.5  6 minutes, x    11. 3 way hip, 2 x10 reps (blue tb) each LE, x    12. bridges with ball, 1 x10 hold 5-10 sec, x    13. chair squats SL, 10x    14. trunk extension in prone, 10 x    15. superman alt UE/LE, 12x    16. hamstring curls, 2 x 15 (R)  2x 10 (L)  (blue tb)    17. monster walks, 2 x12 steps each (blue tb), x    18. foam roller, 2-3'    19. BOSU squats, 1 [id number], xxxx    20. blue discs, xxxxxx    Therapeutic Treatments and Modalities:     1. Manual Therapy (CPT 57115), (R) knee, Kinesiotaping to (R) patellar for tracking medially    2. Neuromuscular Re-education (CPT 45197), (R) knee, steps, lunges, bridges  Time-based treatments/modalities:    Physical Therapy Timed Treatment Charges  Manual therapy minutes (CPT 12658): 10 minutes  Neuromusc re-ed, balance, coor, post minutes (CPT 84335): 15 " "minutes  Therapeutic exercise minutes (CPT 97116): 20 minutes    ASSESSMENT:   Response to treatment:   Performed eccentric quads at 75 deg angle; had increased pain at (R) knee still with pressure / pain superior to the patella. Squatting over the (R) knee still affects him with superior patellar pain and stepping over the 8\" step box during carry-overs.      PLAN/RECOMMENDATIONS:   Plan for treatment: therapy treatment to continue next visit.  Planned interventions for next visit: continue with current treatment.         "

## 2022-10-26 ENCOUNTER — OFFICE VISIT (OUTPATIENT)
Dept: INTERNAL MEDICINE | Facility: OTHER | Age: 32
End: 2022-10-26
Payer: COMMERCIAL

## 2022-10-26 VITALS
OXYGEN SATURATION: 95 % | HEIGHT: 73 IN | SYSTOLIC BLOOD PRESSURE: 120 MMHG | TEMPERATURE: 97.8 F | BODY MASS INDEX: 31.86 KG/M2 | WEIGHT: 240.4 LBS | DIASTOLIC BLOOD PRESSURE: 81 MMHG | HEART RATE: 80 BPM

## 2022-10-26 DIAGNOSIS — R12 HEART BURN: ICD-10-CM

## 2022-10-26 DIAGNOSIS — M54.6 MIDLINE THORACIC BACK PAIN, UNSPECIFIED CHRONICITY: ICD-10-CM

## 2022-10-26 DIAGNOSIS — R73.01 IMPAIRED FASTING GLUCOSE: ICD-10-CM

## 2022-10-26 DIAGNOSIS — R00.0 TACHYCARDIA: Primary | ICD-10-CM

## 2022-10-26 DIAGNOSIS — Z23 NEED FOR VACCINE FOR TD (TETANUS-DIPHTHERIA): ICD-10-CM

## 2022-10-26 DIAGNOSIS — E79.0 HYPERURICEMIA: ICD-10-CM

## 2022-10-26 PROBLEM — R06.2 INSPIRATORY WHEEZE ON EXAMINATION: Status: RESOLVED | Noted: 2022-09-15 | Resolved: 2022-10-26

## 2022-10-26 PROCEDURE — 90471 IMMUNIZATION ADMIN: CPT | Performed by: INTERNAL MEDICINE

## 2022-10-26 PROCEDURE — 90715 TDAP VACCINE 7 YRS/> IM: CPT | Performed by: INTERNAL MEDICINE

## 2022-10-26 PROCEDURE — 99214 OFFICE O/P EST MOD 30 MIN: CPT | Mod: 25,GC

## 2022-10-26 RX ORDER — OMEPRAZOLE 20 MG/1
20 CAPSULE, DELAYED RELEASE ORAL DAILY
Qty: 30 CAPSULE | Refills: 3 | Status: SHIPPED | OUTPATIENT
Start: 2022-10-26 | End: 2022-11-19 | Stop reason: SDUPTHER

## 2022-10-26 RX ORDER — ALLOPURINOL 100 MG/1
100 TABLET ORAL DAILY
Qty: 30 TABLET | Refills: 3 | Status: SHIPPED | OUTPATIENT
Start: 2022-10-26 | End: 2023-01-18 | Stop reason: SDUPTHER

## 2022-10-26 ASSESSMENT — FIBROSIS 4 INDEX: FIB4 SCORE: 0.52

## 2022-10-26 NOTE — PROGRESS NOTES
Established Patient    Patient Care Team:  Andie Mark M.D. as PCP - General (Internal Medicine)  Keaton Hudson, PT as Physical Therapist (Physical Therapy)    Gil Haskins is a 32 y.o. male who presents today with the following Chief Complaint(s): Follow up for Diagnoses of Midline thoracic back pain, unspecified chronicity, Need for vaccine for Td (tetanus-diphtheria), Tachycardia, Impaired fasting glucose, Hyperuricemia, and Heart burn were pertinent to this visit.    HPI:  Patient here to review recent labs:    Uric Acid- 9.6. Last gout attack was end of January. Prior to that was 4 years ago.   Hgb A1c- 5.9- eats a lot of rice, recently cut back on soda to diet.  Hypertriglyceridemia- seen by nutrition and made diet changes. recent triglycerides down to 277 from 468.    Tobacco use-  Is on tapered nicotine patch. Doing well.   Still smoking 2-3 cigarettes/day, still smoking from habit rather than needing nicotine.    Having mid back pain-   started about a month ago.   Pain is worse if sleeping for longer times. Has been having a hard time finding a good mattresses. Currently sleeping on floor. 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 Has not tried any medications. No alarm symptoms. Non-radiating.     Tachycardia-  Recent episode of tacychardia HR of 140 for 2 hours after large meal.   Prior maude patch with ST, no arrhythmias. Denies heavy caffeine use, though does drink 12 oz coke zero/day.        ROS:     Denies any new chest pain or shortness of breath.  No changes to urinary or bowel function.   See HPI.    No past medical history on file.  Social History     Tobacco Use    Smoking status: Every Day     Types: Cigarettes     Start date: 2009    Smokeless tobacco: Never    Tobacco comments:     2 cigarettes daily   Vaping Use    Vaping Use: Never used   Substance Use Topics    Alcohol use: Never    Drug use: Never     Current Outpatient Medications   Medication Sig Dispense  "Refill    Omega-3 Fatty Acids (FISH OIL PO) Take  by mouth.      allopurinol (ZYLOPRIM) 100 MG Tab Take 1 Tablet by mouth every day. 30 Tablet 3    omeprazole (PRILOSEC) 20 MG delayed-release capsule Take 1 Capsule by mouth every day. 30 Capsule 3    nicotine (NICODERM) 7 MG/24HR PATCH 24 HR Place 1 Patch on the skin every 24 hours for 14 days. 14 Patch 0    vitamin D3 (CHOLECALCIFEROL) 1000 Unit (25 mcg) Tab Take 1,000 Units by mouth every day.      calcium carbonate (OS-ROLANDO 500) 1250 (500 Ca) MG Tab Take 500 mg by mouth every day.       No current facility-administered medications for this visit.     Physical Exam:  /81 (BP Location: Left arm, Patient Position: Sitting, BP Cuff Size: Adult)   Pulse 80   Temp 36.6 °C (97.8 °F) (Temporal)   Ht 1.854 m (6' 1\")   Wt 109 kg (240 lb 6.4 oz)   SpO2 95%   BMI 31.72 kg/m²   General: Well developed, well nourished male, in no distress.  Eyes: Conjuntiva without any obvious injection or erythema.   Cardiovascular: Heart is regular with out murmur  Lungs: Clear to auscultation bilaterally. No wheezes, rhonci or crackles heard. Respiratory effort is normal.  Abd: Soft, non-tender  Ext: No edema      Assessment and Plan:     1. Tachycardia  Unclear etiology.   Zio patch without concern for arrythmia  Have considered possible nicotine involvement vs panic attacks.  Patient is still tapering down on nicotine.  Episodes have reduced in frequency.  Recommended patient continue to focus on quitting smoking and cutting out caffeine.   Ultimately these may be panic related and may consider providing a prn medication going forward for these episodes. Could consider propranolol.    2. Midline thoracic back pain, unspecified chronicity  No alarm symptoms.  Likely related to poor posture and lack of mattress.  - Referral to Physical Therapy    3. Impaired fasting glucose  Reviewed lifestyle modifications including reducing rice, cutting out soda, and eating whole food based " diet.   Will repeat Hgb A1c in 3 months    4. Hyperuricemia  No recent gout attacks however patient continues to have elevated uric acid despite diet changes.   Will start allopurinol as a preventative.   - allopurinol (ZYLOPRIM) 100 MG Tab; Take 1 Tablet by mouth every day.  Dispense: 30 Tablet; Refill: 3    5. Heart burn  Stable.   - omeprazole (PRILOSEC) 20 MG delayed-release capsule; Take 1 Capsule by mouth every day.  Dispense: 30 Capsule; Refill: 3    6. Need for vaccine for Td (tetanus-diphtheria)  Given today  - Tdap Vaccine =>8YO IM        Return in about 6 weeks (around 12/5/2022).    Patient Instructions   Is good to see you today Gil!    Today we discussed your tobacco use.  It seems like the nicotine patches are helping.  Recommend looking into getting the fake cigarettes sticks to help with cutting back on the habit of smoking.    Your uric acid continues to be elevated.  Recommend starting medication called allopurinol to help reduce your uric acid levels and prevent future gout attacks.  Continue to follow your diet as discussed with the nutritionist.    Given your elevated fasting blood sugar additionally recommend cutting back on carbohydrates.  Reduce the amount of rice and stop drinking diet soda.  Recommend switching to something like Carolyn or Roman or water.    For your back pain of sent a referral to physical therapy to address this.  Recommend trying to find a good mattress.  And work on posture when sitting at the computer.    For your elevated heart rate, this could be physiologic after eating a large meal.  Discussed making sure you stay well-hydrated drinking 120 ounces of water per day.  If you continue to have episodes of increased heart rate we may consider adding a medication to help address this.    You are getting your tetanus booster in the office today.  Recommend getting your fourth COVID booster and the hepatitis B series vaccines at the pharmacy of your choice at your  convenience.    Otherwise lets plan to follow-up in 5 to 6 weeks to see how things are going.  We will recheck your blood sugar level in 2 to 3 months.    Feel free to reach out sooner if needed.        Andie Mark M.D. PGY2  [facility]    This note was created using voice recognition software.  While every attempt is made to ensure accuracy of transcription, occasionally errors occur.

## 2022-10-26 NOTE — PATIENT INSTRUCTIONS
Is good to see you today Gil!    Today we discussed your tobacco use.  It seems like the nicotine patches are helping.  Recommend looking into getting the fake cigarettes sticks to help with cutting back on the habit of smoking.    Your uric acid continues to be elevated.  Recommend starting medication called allopurinol to help reduce your uric acid levels and prevent future gout attacks.  Continue to follow your diet as discussed with the nutritionist.    Given your elevated fasting blood sugar additionally recommend cutting back on carbohydrates.  Reduce the amount of rice and stop drinking diet soda.  Recommend switching to something like Carolyn or Roman or water.    For your back pain of sent a referral to physical therapy to address this.  Recommend trying to find a good mattress.  And work on posture when sitting at the computer.    For your elevated heart rate, this could be physiologic after eating a large meal.  Discussed making sure you stay well-hydrated drinking 120 ounces of water per day.  If you continue to have episodes of increased heart rate we may consider adding a medication to help address this.    You are getting your tetanus booster in the office today.  Recommend getting your fourth COVID booster and the hepatitis B series vaccines at the pharmacy of your choice at your convenience.    Otherwise lets plan to follow-up in 5 to 6 weeks to see how things are going.  We will recheck your blood sugar level in 2 to 3 months.    Feel free to reach out sooner if needed.

## 2022-11-02 ENCOUNTER — PHYSICAL THERAPY (OUTPATIENT)
Dept: PHYSICAL THERAPY | Facility: REHABILITATION | Age: 32
End: 2022-11-02
Payer: COMMERCIAL

## 2022-11-02 ENCOUNTER — HOSPITAL ENCOUNTER (OUTPATIENT)
Dept: PULMONOLOGY | Facility: MEDICAL CENTER | Age: 32
End: 2022-11-02
Payer: COMMERCIAL

## 2022-11-02 DIAGNOSIS — R06.2 INSPIRATORY WHEEZE ON EXAMINATION: ICD-10-CM

## 2022-11-02 DIAGNOSIS — M25.561 ACUTE PAIN OF RIGHT KNEE: ICD-10-CM

## 2022-11-02 PROCEDURE — 94729 DIFFUSING CAPACITY: CPT | Mod: 26 | Performed by: STUDENT IN AN ORGANIZED HEALTH CARE EDUCATION/TRAINING PROGRAM

## 2022-11-02 PROCEDURE — 97110 THERAPEUTIC EXERCISES: CPT

## 2022-11-02 PROCEDURE — 94726 PLETHYSMOGRAPHY LUNG VOLUMES: CPT | Mod: 26 | Performed by: STUDENT IN AN ORGANIZED HEALTH CARE EDUCATION/TRAINING PROGRAM

## 2022-11-02 PROCEDURE — 94726 PLETHYSMOGRAPHY LUNG VOLUMES: CPT

## 2022-11-02 PROCEDURE — 97140 MANUAL THERAPY 1/> REGIONS: CPT

## 2022-11-02 PROCEDURE — 94060 EVALUATION OF WHEEZING: CPT | Mod: 26 | Performed by: STUDENT IN AN ORGANIZED HEALTH CARE EDUCATION/TRAINING PROGRAM

## 2022-11-02 PROCEDURE — 94729 DIFFUSING CAPACITY: CPT

## 2022-11-02 PROCEDURE — 94060 EVALUATION OF WHEEZING: CPT

## 2022-11-02 PROCEDURE — 97112 NEUROMUSCULAR REEDUCATION: CPT

## 2022-11-02 RX ADMIN — Medication 2.5 MG: at 07:52

## 2022-11-02 ASSESSMENT — PULMONARY FUNCTION TESTS
FEV1/FVC_PREDICTED: 84
FEV1_PREDICTED: 3.94
FEV1/FVC_PERCENT_PREDICTED: 84
FEV1/FVC_PERCENT_PREDICTED: 109
FVC: 4.42
FVC_PREDICTED: 4.7
FEV1/FVC: 87.56
FEV1/FVC_PERCENT_PREDICTED: 105
FEV1/FVC_PERCENT_PREDICTED: 110
FEV1_PERCENT_CHANGE: 3
FEV1/FVC_PERCENT_LLN: 70
FEV1_PERCENT_PREDICTED: 99
FEV1_PERCENT_CHANGE: -1
FEV1/FVC: 92
FEV1: 3.87
FVC_LLN: 3.93
FEV1_PERCENT_PREDICTED: 98
FEV1/FVC: 87
FEV1/FVC_PERCENT_LLN: 70
FVC_PERCENT_PREDICTED: 90
FEV1_LLN: 3.29
FVC_LLN: 3.93
FVC_PERCENT_PREDICTED: 93
FEV1/FVC_PERCENT_PREDICTED: 103
FEV1: 3.93
FEV1_LLN: 3.29
FEV1/FVC_PERCENT_CHANGE: -33
FVC: 4.26
FEV1/FVC_PERCENT_CHANGE: -5
FEV1/FVC: 92

## 2022-11-02 NOTE — OP THERAPY DAILY TREATMENT
"  Outpatient Physical Therapy  DAILY TREATMENT     [facility] [place] [id number], [address]  [place] [address]  Phone:  [phone]    Date: 11/02/2022    Patient: Gil Haskins  YOB: 1990  MRN: [id number]     Time Calculation    Start time: 0815  Stop time: 0900 Time Calculation (min): 45 minutes         Chief Complaint: Knee Problem and Difficulty Walking    Visit #: 16    SUBJECTIVE:  The patient reports having less pain while driving ans sitting in the back seat less symptoms while active with his (R) LE    OBJECTIVE:  Current objective measures:       Improve stability over the (R) knee in squatting and dead-lifts      Therapeutic Exercises (CPT 72132):     1. Asher deadlifts, 1x 5 reps, 10#  xxxx    2. prone quad stretch, 3 x30 sec    3. hamstring stretch, 2 x30 sec    4. bridges, 1 x10 reps    5. leg extensions, 2 x 25 reps (blue tb)    6. leg press, 7B @ 1 x30 reps double stance; 4B (R) LE @ 1x 20 reps    7. step ups (8\"steps), 2 x10 reps, with weights 20#  x    8. alternating lunges, 2 x 20 reps    9. eccentric quads, 2 x10 reps, 22.5 \" seat height x    10. Upright bike, seat 8  L4.5  6 minutes    11. 3 way hip, 2 x10 reps (blue tb) each LE    12. bridges with ball, 1 x7 hold 5-10 sec    13. chair squats SL, 10x    14. trunk extension in prone, 10 x    15. superman alt UE/LE, 12x    16. hamstring curls, 2 x 15 (R)  2x 10 (L)  (blue tb)    17. monster walks, 2 x12 steps each (blue tb), x    18. foam roller, 2-3'    19. BOSU squats, 1 l05asca, xxxx    20. blue discs, xxxxxx    Therapeutic Treatments and Modalities:     1. Manual Therapy (CPT 10773), (R) knee, Kinesiotaping to (R) patellar for tracking medially    2. Neuromuscular Re-education (CPT 01620), (R) knee, steps, lunges, bridges  Time-based treatments/modalities:    Physical Therapy Timed Treatment Charges  Manual therapy minutes (CPT 91371): 10 minutes  Neuromusc re-ed, balance, coor, post minutes (CPT 11409): " 20 minutes  Therapeutic exercise minutes (CPT 84537): 15 minutes    ASSESSMENT:   Response to treatment:   The patient performed Asher dead-lifts with increased weight on variable surface; patient had some difficulty with with dynamic balance over variable foam surface but tolerated.     PLAN/RECOMMENDATIONS:   Plan for treatment: therapy treatment to continue next visit.  Planned interventions for next visit: continue with current treatment.

## 2022-11-04 ENCOUNTER — PHYSICAL THERAPY (OUTPATIENT)
Dept: PHYSICAL THERAPY | Facility: REHABILITATION | Age: 32
End: 2022-11-04
Payer: COMMERCIAL

## 2022-11-04 DIAGNOSIS — M25.561 ACUTE PAIN OF RIGHT KNEE: ICD-10-CM

## 2022-11-04 PROCEDURE — 97110 THERAPEUTIC EXERCISES: CPT

## 2022-11-04 PROCEDURE — 97140 MANUAL THERAPY 1/> REGIONS: CPT

## 2022-11-04 PROCEDURE — 97112 NEUROMUSCULAR REEDUCATION: CPT

## 2022-11-04 NOTE — OP THERAPY DAILY TREATMENT
"  Outpatient Physical Therapy  DAILY TREATMENT     [facility] [place] [address] [facility], [address]  [place] [address]  Phone:  [phone]    Date: 11/04/2022    Patient: Gil Haskins  YOB: 1990  MRN: [id number]     Time Calculation    Start time: 0800  Stop time: 0845 Time Calculation (min): 45 minutes         Chief Complaint: Knee Problem    Visit #: 17    SUBJECTIVE:  The patient reports some soreness to the upper and lower legs after exercises the last 2 days.    OBJECTIVE:  Current objective measures:       Increase strength to the (R) hip and quadriceps during static and dynamic balance     Therapeutic Exercises (CPT 15072):     1. Asher deadlifts, 1x10 reps @ 10#, x    2. prone quad stretch, 3 x30 sec, x    3. hamstring stretch, 2 x30 sec, x    4. bridges (hamstring curls position), 2x10 reps 5 sec hold, x    5. leg extensions, 2 x 25 reps (blue tb)    6. leg press, 7B @ 1 x15 reps single stance on each    7. step ups (8\"steps), 2 x10 reps, with weights 20#    8. alternating lunges, 2 x 20 reps    9. eccentric quads, 2 x10 reps @ 6 #, 22.5 \" seat height x    10. Upright bike, seat 8  L4.5  6 minutes    11. 3 way hip, 2 x10 reps (blue tb) each LE    12. bridges with ball, 1 x5 holding 5 sec, x    13. chair squats SL, 10x, x    14. trunk extension in prone, 10 x    15. superman alt UE/LE, 12x    16. hamstring curls, 2 x 15 (R)  2x 10 (L)  (blue tb)    17. monster walks, 2 x12 steps each (blue tb)    18. foam roller, 2-3'    19. BOSU squats, 1 [id number], x    20. blue discs, xxxxxx    Therapeutic Treatments and Modalities:     1. Manual Therapy (CPT 84248), (R) knee, Kinesiotaping to (R) patellar for tracking medially    2. Neuromuscular Re-education (CPT 29820), (R) knee, steps, lunges, bridges  Time-based treatments/modalities:    Physical Therapy Timed Treatment Charges  Manual therapy minutes (CPT 62504): 15 minutes  Neuromusc re-ed, balance, coor, post minutes (CPT 14775): 15 " minutes  Therapeutic exercise minutes (CPT 09007): 15 minutes  ASSESSMENT:   Response to treatment:   Performed stability exercises using increased weight with eccentric quadriceps single leg squats over bilateral LE's. The patient has some pain to the (R) knee just before touching surface of exam table during squat. He is using 6# weights in each     PLAN/RECOMMENDATIONS:   Plan for treatment: therapy treatment to continue next visit.  Planned interventions for next visit: continue with current treatment.

## 2022-11-04 NOTE — OP THERAPY DAILY TREATMENT
"  Outpatient Physical Therapy  DAILY TREATMENT     [facility] Therapy [place] [address] [facility], [address]  [place] [address]  Phone:  [phone]    Date: 11/07/2022    Patient: Gil Haskins  YOB: 1990  MRN: [id number]     Time Calculation    Start time: 0730  Stop time: 0815 Time Calculation (min): 45 minutes         Chief Complaint: Knee Problem    Visit #: 18    SUBJECTIVE:  The patient reports having less symptoms with the knees today.    OBJECTIVE:  Current objective measures:       Decrease pain to the (R) knee; improve stability and strength with increased resistance      Therapeutic Exercises (CPT 41322):     1. Asher deadlifts, 1x10 reps @ 10#, x    2. prone quad stretch, 3 x30 sec, x    3. hamstring stretch, 2 x30 sec, x    4. bridges (hamstring curls position), 2x10 reps 5 sec hold, x    5. leg extensions, 2 x 25 reps (blue tb)    6. leg press, 7B @ 1 x15 reps single stance on each    7. step ups (8\"steps), 2 x10 reps, with weights 20#   x (L) LE today    8. alternating lunges, 2 x 20 reps, x    9. eccentric quads, 2 x10 reps @ 6 #, 22.5 \" seat height x    10. Upright bike, seat 8  L4.5  6 minutes    11. 3 way hip, 2 x10 reps (blue tb) each LE    12. bridges with ball, 1 x 5 holding 5 sec, x    13. chair squats SL, 10x, x    14. trunk extension in prone, 10 x    15. superman alt UE/LE, 12x    16. hamstring curls, 2 x 15 (R)  2x 10 (L)  (blue tb)    17. monster walks, 2 x12 steps each (blue tb)    18. foam roller, NT    19. BOSU squats, 1 [id number], x    20. blue discs, xxxxxx    Therapeutic Treatments and Modalities:     1. Manual Therapy (CPT 62276), (R) knee, Kinesiotaping to (R) patellar for tracking medially    2. Neuromuscular Re-education (CPT 65597), (R) knee, steps, lunges, bridges  Time-based treatments/modalities:       ASSESSMENT:   Response to treatment:   Contract relax techniques performed reduces the connective tissue tension. Patient still has some pain at the (R) " knee during eccentric quads at the bend. Discussed importance of target heart rate and losing weight today.      PLAN/RECOMMENDATIONS:   Plan for treatment: therapy treatment to continue next visit.  Planned interventions for next visit: continue with current treatment.

## 2022-11-07 ENCOUNTER — PHYSICAL THERAPY (OUTPATIENT)
Dept: PHYSICAL THERAPY | Facility: REHABILITATION | Age: 32
End: 2022-11-07
Payer: COMMERCIAL

## 2022-11-07 DIAGNOSIS — M25.561 ACUTE PAIN OF RIGHT KNEE: ICD-10-CM

## 2022-11-07 PROCEDURE — 97112 NEUROMUSCULAR REEDUCATION: CPT

## 2022-11-07 PROCEDURE — 97110 THERAPEUTIC EXERCISES: CPT

## 2022-11-07 PROCEDURE — 97140 MANUAL THERAPY 1/> REGIONS: CPT

## 2022-11-07 NOTE — PROCEDURES
DATE OF SERVICE:  11/02/2022     PULMONARY FUNCTION TEST INTERPRETATION     There is no significant obstruction or restriction.  No significant   bronchodilator response.  Normal diffusion capacity.        ______________________________  MD YAMILKA Colby/JESSEE    DD:  11/06/2022 22:31  DT:  11/07/2022 00:11    Job#:  [id number]

## 2022-11-10 NOTE — OP THERAPY DAILY TREATMENT
"  Outpatient Physical Therapy  DAILY TREATMENT     [facility] [place]  [address] [facility], [address]  [place] [address]  Phone:  [phone]    Date: 11/11/2022    Patient: Gil Haskins  YOB: 1990  MRN: [id number]     Time Calculation                   Chief Complaint: Difficulty Walking and Knee Problem    Visit #: 19    SUBJECTIVE:  The patient reports his (R) knee was more painful the last day while he was working on his feet. His low back has pain on and off again while weightbearing longer durations.    OBJECTIVE:  Current objective measures:       Decrease lumbar pain; increase AROM in flexion    Therapeutic Exercises (CPT 25816):     1. Asher deadlifts, 1x10 reps @ 10#, x    2. prone quad stretch, 3 x30 sec, x    3. hamstring stretch, 2 x30 sec, x    4. bridges (hamstring curls position), 2x10 reps 5 sec hold, x    5. leg extensions, 2 x 25 reps (blue tb)    6. leg press, 7B @ 1 x15 reps single stance on each    7. step ups (8\"steps), 2 x10 reps, with weights 20#   x (L) LE today    8. alternating lunges, 2 x 20 reps, x    9. eccentric quads, 2 x10 reps @ 6 #, 22.5 \" seat height x    10. Upright bike, seat 8  L4.5  6 minutes    11. 3 way hip, 2 x10 reps (blue tb) each LE    12. bridges with ball, 1 x 5 holding 5 sec, x    13. chair squats SL, 10x, x    14. trunk extension in prone, 10 x    15. superman alt UE/LE, 12x    16. hamstring curls, 2 x 15 (R)  2x 10 (L)  (blue tb)    17. monster walks, 2 x12 steps each (blue tb)    18. foam roller, NT    19. BOSU squats, 1 [id number], x    20. blue discs, xxxxxx    Therapeutic Treatments and Modalities:     1. Manual Therapy (CPT 77162), (R) knee, Kinesiotaping to (R) patellar for tracking medially    2. Neuromuscular Re-education (CPT 91669), (R) knee, steps, lunges, bridges  Time-based treatments/modalities:       ASSESSMENT:   Response to treatment:   Static and contract release stretching techniques for LE's quads, hamstrings; patient " reports less stiffness following. STM to the lumbar paraspinals and QL's bilaterally. CPA's to grade 2-3 at L1-L5 with tenderness at L2 to the (R) side. Patient demonstrated increased trunk flexion during Asher dead-lifts from ground level.       PLAN/RECOMMENDATIONS:   Plan for treatment: therapy treatment to continue next visit.  Planned interventions for next visit: continue with current treatment.

## 2022-11-11 ENCOUNTER — PHYSICAL THERAPY (OUTPATIENT)
Dept: PHYSICAL THERAPY | Facility: REHABILITATION | Age: 32
End: 2022-11-11
Payer: COMMERCIAL

## 2022-11-11 DIAGNOSIS — M25.561 ACUTE PAIN OF RIGHT KNEE: ICD-10-CM

## 2022-11-11 PROCEDURE — 97140 MANUAL THERAPY 1/> REGIONS: CPT

## 2022-11-11 PROCEDURE — 97110 THERAPEUTIC EXERCISES: CPT

## 2022-11-11 PROCEDURE — 97112 NEUROMUSCULAR REEDUCATION: CPT

## 2022-11-15 ENCOUNTER — PHYSICAL THERAPY (OUTPATIENT)
Dept: PHYSICAL THERAPY | Facility: REHABILITATION | Age: 32
End: 2022-11-15
Payer: COMMERCIAL

## 2022-11-15 DIAGNOSIS — M25.561 ACUTE PAIN OF RIGHT KNEE: ICD-10-CM

## 2022-11-15 PROCEDURE — 97112 NEUROMUSCULAR REEDUCATION: CPT

## 2022-11-15 PROCEDURE — 97140 MANUAL THERAPY 1/> REGIONS: CPT

## 2022-11-15 PROCEDURE — 97110 THERAPEUTIC EXERCISES: CPT

## 2022-11-15 NOTE — OP THERAPY DAILY TREATMENT
"  Outpatient Physical Therapy  DAILY TREATMENT     [facility] [place]  [address], [address]  [place] [address]  Phone:  [phone]    Date: 11/15/2022    Patient: Gil Haskins  YOB: 1990  MRN: [id number]     Time Calculation    Start time: 0330  Stop time: 0415 Time Calculation (min): 45 minutes         Chief Complaint: Knee Problem    Visit #: 20    SUBJECTIVE:  The patient reports bilateral knee pain today; patient thinks he was sitting too long today while working     OBJECTIVE:  Current objective measures:       Decrease low back stiffness; increase mobility and strength to core muscles in lifting from ground levels    Therapeutic Exercises (CPT 67278):     1. Asher deadlifts, 1x10 reps @ 10#, x    2. prone quad stretch, 3 x30 sec, x    3. hamstring stretch, 2 x30 sec, x    4. bridges (hamstring curls position), 2x10 reps 5 sec hold, x    5. leg extensions, 2 x 25 reps (blue tb)    6. leg press, 7B @ 1 x15 reps single stance on each    7. thread the needle, 1 x5 reps 10 sec hold, with weights 20#   x (L) LE today    8. alternating lunges, 2 x 20 reps, x    9. eccentric quads, 2 x10 reps @ 6 #, 22.5 \" seat height x    10. Upright bike, seat 8  L4.5  6 minutes    11. 3 way hip, 2 x10 reps (blue tb) each LE    12. bridges with ball, 1 x 5 holding 5 sec, x    13. chair squats SL, 10x, x    14. trunk extension in prone, 10 x    15. superman alt UE/LE, 15x    16. hamstring curls, 2 x 15 (R)  2x 10 (L)  (blue tb)    17. monster walks, 2 x12 steps each (blue tb)    18. foam roller, NT, xxxxx    19. BOSU squats, 1 e08ctbo, x    20. lifting boxes/weights from ground levels to table height, 1 x10 reps    Therapeutic Treatments and Modalities:     1. Manual Therapy (CPT 77196), (R) knee, Kinesiotaping to (R) patellar for tracking medially    2. Neuromuscular Re-education (CPT 50744), (R) knee, steps, lunges, bridges  Time-based treatments/modalities:    Physical Therapy Timed " Treatment Charges  Manual therapy minutes (CPT 69300): 10 minutes  Neuromusc re-ed, balance, coor, post minutes (CPT 22570): 15 minutes  Therapeutic exercise minutes (CPT 55154): 20 minutes    ASSESSMENT:   Response to treatment:   CPA's grade 2-3 to T4-5, T5-6, T6-7 with cavitation occurring during treatment; patient reported good release of tension release.  Performed functional movements lifting weighted boxes from ground level position to table height with good control, body mechanics and no lumbar pain. Next visit: foam roller to mobilize thoracic- lumbar         PLAN/RECOMMENDATIONS:   Plan for treatment: therapy treatment to continue next visit.  Planned interventions for next visit: continue with current treatment.

## 2022-11-17 NOTE — OP THERAPY DAILY TREATMENT
"  Outpatient Physical Therapy  DAILY TREATMENT     [facility] Outpatient Physical Therapy [place] [address]  [place] [address]  Phone:  [phone]    Date: 11/18/2022    Patient: Gil Haskins  YOB: 1990  MRN: [id number]     Time Calculation    Start time: 0800  Stop time: 0845 Time Calculation (min): 45 minutes         Chief Complaint: Knee Problem and Back Problem    Visit #: 21    SUBJECTIVE:  The patient reports having no pain today in his (R) knee or low back currently    OBJECTIVE:  Current objective measures:       Improve strength in lumbar multifidi; assess body mechanics lifting from ground level    Therapeutic Exercises (CPT 33652):     1. Asher deadlifts, 1x10 reps @ 10#, x    2. prone quad stretch, 3 x30 sec, x    3. hamstring stretch, 2 x30 sec, x    4. bridges (hamstring curls position), 2x10 reps 5 sec hold, x    5. leg extensions, 2 x 25 reps (blue tb)    6. leg press, 7B @ 1 x15 reps single stance on each    7. thread the needle, 1 x5 reps 10 sec hold, with weights 20#   x (L) LE today    8. alternating lunges, 2 x 20 reps, x    9. eccentric quads, 2 x10 reps @ 6 #, 22.5 \" seat height x    10. Upright bike, seat 8  L4.5  6 minutes    11. 3 way hip, 2 x10 reps (blue tb) each LE    12. bridges with ball, 1 x 5 holding 5 sec, x    13. chair squats SL, 10x, x    14. trunk extension in prone, 10 x    15. superman alt UE/LE, 15x    16. hamstring curls, 2 x 15 (R)  2x 10 (L)  (blue tb)    17. monster walks, 2 x12 steps each (blue tb)    18. foam roller, NT, xxxxx    19. BOSU squats, 1 [id number], x    20. lifting boxes/weights from ground levels to table height, 1 x10 reps    Therapeutic Treatments and Modalities:     2. Neuromuscular Re-education (CPT 82833), (R) knee, steps, lunges, bridges, squats, clean and jerk  Time-based treatments/modalities:    Physical Therapy Timed Treatment Charges  Neuromusc re-ed, balance, coor, post minutes (CPT 07028): 15 minutes  Therapeutic exercise " minutes (CPT 33032): 30 minutes      ASSESSMENT:   Response to treatment:   Patient was instructed with information on techniques for power clean and jerk and squats in power rack. Patient needed verbal cuing to correct technique and form during both but successfully developed good movement and motor control.       PLAN/RECOMMENDATIONS:   Plan for treatment: therapy treatment to continue next visit.  Planned interventions for next visit: continue with current treatment.

## 2022-11-18 ENCOUNTER — PHYSICAL THERAPY (OUTPATIENT)
Dept: PHYSICAL THERAPY | Facility: REHABILITATION | Age: 32
End: 2022-11-18
Payer: COMMERCIAL

## 2022-11-18 DIAGNOSIS — M25.561 ACUTE PAIN OF RIGHT KNEE: ICD-10-CM

## 2022-11-18 PROCEDURE — 97112 NEUROMUSCULAR REEDUCATION: CPT

## 2022-11-18 PROCEDURE — 97110 THERAPEUTIC EXERCISES: CPT

## 2022-11-19 DIAGNOSIS — R12 HEART BURN: ICD-10-CM

## 2022-11-21 NOTE — TELEPHONE ENCOUNTER
Received request via: Pharmacy    Was the patient seen in the last year in this department? Yes    Does the patient have an active prescription (recently filled or refills available) for medication(s) requested?  Yes    Does the patient have [facility] and need 100 day supply (blood pressure, diabetes and cholesterol meds only)? Patient does not have SCP

## 2022-11-22 ENCOUNTER — PHYSICAL THERAPY (OUTPATIENT)
Dept: PHYSICAL THERAPY | Facility: REHABILITATION | Age: 32
End: 2022-11-22
Payer: COMMERCIAL

## 2022-11-22 DIAGNOSIS — M25.561 ACUTE PAIN OF RIGHT KNEE: ICD-10-CM

## 2022-11-22 PROCEDURE — 97112 NEUROMUSCULAR REEDUCATION: CPT

## 2022-11-22 PROCEDURE — 97140 MANUAL THERAPY 1/> REGIONS: CPT

## 2022-11-22 PROCEDURE — 97110 THERAPEUTIC EXERCISES: CPT

## 2022-11-22 RX ORDER — OMEPRAZOLE 20 MG/1
20 CAPSULE, DELAYED RELEASE ORAL DAILY
Qty: 30 CAPSULE | Refills: 3 | Status: SHIPPED | OUTPATIENT
Start: 2022-11-22 | End: 2023-02-24 | Stop reason: SDUPTHER

## 2022-11-22 NOTE — OP THERAPY DAILY TREATMENT
"  Outpatient Physical Therapy  DAILY TREATMENT     [facility] Physical Therapy [place] [id number] [facility], [address]  [place] [address]  Phone:  [phone]    Date: 11/22/2022    Patient: Gil Haskins  YOB: 1990  MRN: [id number]     Time Calculation    Start time: 0800  Stop time: 0845 Time Calculation (min): 45 minutes         Chief Complaint: Back Problem and Knee Problem    Visit #: 22    SUBJECTIVE:  The patient reports having less pain to his back and knees currently.     OBJECTIVE:  Current objective measures:       Improve functional stability to the (R) LE in stepping positions lunging; decrease lumbar tightness increases mobility.    Therapeutic Exercises (CPT 46799):     1. Asher deadlifts, 1x10 reps @ 10#, x    2. prone quad stretch, 3 x30 sec, x    3. hamstring stretch, 2 x30 sec, x    4. bridges (hamstring curls position), 1x10 reps 5 sec hold    5. leg extensions, 2 x 25 reps (blue tb)    6. leg press, 7B @ 1 x15 reps single stance on each    7. thread the needle, 1 x5 reps 10 sec hold, with weights 20#   x (L) LE today    8. alternating lunges, 2 x 20 reps, x    9. eccentric quads, 2 x10 reps @ 6 #, 22.5 \" seat height x    10. Upright bike, seat 8  L4.5  6 minutes    11. 3 way hip, 2 x10 reps (blue tb) each LE    12. bridges with ball, 1 x 10 holding 5 sec    13. chair squats SL, 10x, x    14. trunk extension in prone, 10 x    15. superman alt UE/LE, 15x    16. hamstring curls, 2 x 15 (R)  2x 10 (L)  (blue tb)    17. monster walks, 2 x12 steps each (blue tb)    18. foam roller, NT, xxxxx    19. BOSU squats, 1 [id number], x    20. lifting boxes/weights from ground levels to table height, 1 x10 reps    Therapeutic Treatments and Modalities:     2. Neuromuscular Re-education (CPT 87536), (R) knee, steps, lunges, bridges, squats, clean and jerk  Time-based treatments/modalities:    Physical Therapy Timed Treatment Charges  Manual therapy minutes (CPT 84704): 15 minutes  Neuromusc re-ed, " balance, coor, post minutes (CPT 47991): 15 minutes  Therapeutic exercise minutes (CPT 58274): 15 minutes      ASSESSMENT:   Response to treatment:   STM to the lumbar paraspinals; CPA's grade 3 at L1-L5 no pain from pressure; PROM in bilateral hip flexion; hamstring stretching both statically and dynamically contract relax techniques; tolerated with less tension to the (R) LE. Performed Clean and Jerk Olympic lift with additional 20 # this session. Tolerated in god form without pain to fatigue response.      PLAN/RECOMMENDATIONS:   Plan for treatment: therapy treatment to continue next visit.  Planned interventions for next visit: continue with current treatment.

## 2022-11-29 ENCOUNTER — PHYSICAL THERAPY (OUTPATIENT)
Dept: PHYSICAL THERAPY | Facility: REHABILITATION | Age: 32
End: 2022-11-29
Payer: COMMERCIAL

## 2022-11-29 DIAGNOSIS — M25.561 ACUTE PAIN OF RIGHT KNEE: ICD-10-CM

## 2022-11-29 PROCEDURE — 97140 MANUAL THERAPY 1/> REGIONS: CPT

## 2022-11-29 PROCEDURE — 97112 NEUROMUSCULAR REEDUCATION: CPT

## 2022-11-29 PROCEDURE — 97110 THERAPEUTIC EXERCISES: CPT

## 2022-11-29 NOTE — OP THERAPY DAILY TREATMENT
"  Outpatient Physical Therapy  DAILY TREATMENT     [facility] Physical Therapy [place]  [employer] [facility], [address]  [place] [address]  Phone:  [phone]    Date: 11/29/2022    Patient: Gil Haskins  YOB: 1990  MRN: [id number]     Time Calculation    Start time: 0800  Stop time: 0845 Time Calculation (min): 45 minutes         Chief Complaint: Back Problem and Knee Problem    Visit #: 23    SUBJECTIVE:  The patient reports back was feeling bad yesterday from possibly sleeping in poor positions.    OBJECTIVE:  Current objective measures:       Decrease lumbar stiffness; improve mobility in trunk    Therapeutic Exercises (CPT 85806):     1. Asher deadlifts, 1x10 reps @ 10#, x    2. prone quad stretch, 3 x30 sec, x    3. hamstring stretch, 2 x30 sec, x    4. bridges (hamstring curls position), 1x10 reps 5 sec hold    5. leg extensions, 2 x 25 reps (blue tb)    6. leg press, 7B @ 1 x15 reps single stance on each    7. thread the needle, 1 x 5 reps 10 sec hold, x    8. alternating lunges, 2 x 20 reps, with 20# each hand    9. eccentric quads, 2 x10 reps @ 6 #, 22.5 \" seat height x    10. Upright bike, seat 8  L4.5  6 minutes    11. 3 way hip, 2 x10 reps (blue tb) each LE    12. supine oblique crunches, 1 x 10 each side    13. chair squats SL, 10x    14. trunk extension in prone, 10 x, x    15. superman alt UE/LE, 15x, x    16. hamstring curls, 2 x 15 (R)  2x 10 (L)  (blue tb)    17. monster walks, 2 x12 steps each (blue tb)    18. foam roller/ abs/ posterior tilts, 3', x    19. BOSU squats, 2 x10 reps, x    20. lifting boxes/weights from ground levels to table height, 1 x10 reps, x    Therapeutic Treatments and Modalities:     2. Neuromuscular Re-education (CPT 10581), (R) knee, steps, lunges, bridges, squats, clean and jerk  Time-based treatments/modalities:    Physical Therapy Timed Treatment Charges  Manual therapy minutes (CPT 25731): 10 minutes  Neuromusc re-ed, balance, coor, post minutes (CPT " 79402): 20 minutes  Therapeutic exercise minutes (CPT 28294): 15 minutes      ASSESSMENT:   Response to treatment:   Self care instructions for developing lumbar multifidi strengthening from prone positions; patient reports increased weakness/ respiratory deconditioning. Patient tolerated to thoracic-lumbar fatigue response. Verbal cues to correct BOSU squat form during ascending descending motions.      PLAN/RECOMMENDATIONS:   Plan for treatment: therapy treatment to continue next visit.  Planned interventions for next visit: continue with current treatment.

## 2022-12-01 ENCOUNTER — PHYSICAL THERAPY (OUTPATIENT)
Dept: PHYSICAL THERAPY | Facility: REHABILITATION | Age: 32
End: 2022-12-01
Payer: COMMERCIAL

## 2022-12-01 DIAGNOSIS — M25.561 ACUTE PAIN OF RIGHT KNEE: ICD-10-CM

## 2022-12-01 PROCEDURE — 97140 MANUAL THERAPY 1/> REGIONS: CPT

## 2022-12-01 PROCEDURE — 97110 THERAPEUTIC EXERCISES: CPT

## 2022-12-01 PROCEDURE — 97112 NEUROMUSCULAR REEDUCATION: CPT

## 2022-12-02 NOTE — OP THERAPY DAILY TREATMENT
"  Outpatient Physical Therapy  DAILY TREATMENT     [facility] Physical Therapy [place] [id number] [facility], [address]  [place] [address]  Phone:  [phone]    Date: 12/01/2022    Patient: Gil Haskins  YOB: 1990  MRN: [id number]     Time Calculation    Start time: 0415  Stop time: 0500 Time Calculation (min): 45 minutes         Chief Complaint: Knee Problem    Visit #: 24    SUBJECTIVE:  The patient reports having less pain to his knee but increased pain to his lower back    OBJECTIVE:  Current objective measures:       Increase strength in hip abductors; quads and hamstrings with functional lifting     Therapeutic Exercises (CPT 39112):     1. Asher deadlifts, 1x10 reps @ 10#, x    2. prone quad stretch, 3 x30 sec, x    3. hamstring stretch, 2 x30 sec, x    4. bridges (hamstring curls position), 1x10 reps 5 sec hold    5. leg extensions, 2 x 25 reps (blue tb)    6. leg press, 7B @ 1 x15 reps single stance on each    7. thread the needle, 1 x 5 reps 10 sec hold, x    8. alternating lunges, 2 x 20 reps, with 20# each hand    9. eccentric quads, 2 x10 reps @ 6 #, 22.5 \" seat height x    10. Upright bike, seat 8  L4.5  6 minutes    11. 3 way hip, 2 x10 reps (blue tb) each LE    12. supine oblique crunches, 1 x 10 each side    13. chair squats SL, 10x    14. trunk extension in prone, 10 x, x    15. superman alt UE/LE, 15x, x    16. hamstring curls, 2 x 15 (R)  2x 10 (L)  (blue tb)    17. monster walks, 2 x12 steps each (blue tb)    18. foam roller/ abs/ posterior tilts, 3', x    19. BOSU squats, 2 x10 reps, x    20. lifting boxes/weights from ground levels to table height, 1 x10 reps, x    Therapeutic Treatments and Modalities:     2. Neuromuscular Re-education (CPT 76198), (R) knee, steps, lunges, bridges, squats, clean and jerk  Time-based treatments/modalities:    Physical Therapy Timed Treatment Charges  Manual therapy minutes (CPT 98509): 15 minutes  Neuromusc re-ed, balance, coor, post minutes (CPT " 07173): 10 minutes  Therapeutic exercise minutes (CPT 50202): 20 minutes    ASSESSMENT:   Response to treatment:   CPA's grade 3 at L3 with tenderness; grade 3 at L4-L5 without pain to (R) side Patient used foam roller for 2-3' minutes and felt increased tenderness across mid thoracic. Patient demonstrated proper lifting techniques with no pain to lower back . Perfomed Asher dead-lifts with increased resistance to fatigue response.      PLAN/RECOMMENDATIONS:   Plan for treatment: therapy treatment to continue next visit.  Planned interventions for next visit: continue with current treatment.

## 2022-12-06 ENCOUNTER — OFFICE VISIT (OUTPATIENT)
Dept: INTERNAL MEDICINE | Facility: OTHER | Age: 32
End: 2022-12-06
Payer: COMMERCIAL

## 2022-12-06 VITALS
HEIGHT: 73 IN | WEIGHT: 240.8 LBS | SYSTOLIC BLOOD PRESSURE: 131 MMHG | OXYGEN SATURATION: 98 % | BODY MASS INDEX: 31.91 KG/M2 | TEMPERATURE: 96.9 F | HEART RATE: 80 BPM | DIASTOLIC BLOOD PRESSURE: 85 MMHG

## 2022-12-06 DIAGNOSIS — R73.03 PREDIABETES: ICD-10-CM

## 2022-12-06 DIAGNOSIS — Z72.0 TOBACCO USE: ICD-10-CM

## 2022-12-06 DIAGNOSIS — R00.0 TACHYCARDIA: ICD-10-CM

## 2022-12-06 DIAGNOSIS — E79.0 HYPERURICEMIA: ICD-10-CM

## 2022-12-06 DIAGNOSIS — R06.83 SNORING: ICD-10-CM

## 2022-12-06 PROCEDURE — 99214 OFFICE O/P EST MOD 30 MIN: CPT | Mod: GC

## 2022-12-06 RX ORDER — PROPRANOLOL HYDROCHLORIDE 10 MG/1
10 TABLET ORAL EVERY EVENING
Qty: 30 TABLET | Refills: 3 | Status: SHIPPED | OUTPATIENT
Start: 2022-12-06 | End: 2023-01-18 | Stop reason: SDUPTHER

## 2022-12-06 ASSESSMENT — FIBROSIS 4 INDEX: FIB4 SCORE: 0.52

## 2022-12-06 NOTE — PROGRESS NOTES
Established Patient    Patient Care Team:  Andie Mark M.D. as PCP - General (Internal Medicine)  Keaton Hudson, PT as Physical Therapist (Physical Therapy)    Gil Haskins is a 32 y.o. male who presents today with the following Chief Complaint(s): Follow up for Diagnoses of Tachycardia, Tobacco use, Snoring, Hyperuricemia, and Prediabetes were pertinent to this visit.    HPI:  Tachycardia  Unclear etiology.   Zio patch without concern for arrythmia; showed sinus tachy average rate of 97, max 175, and 1 episode of SVT lasting 5 beats.   Have considered possible nicotine/caffeine involvement vs panic attacks vs dehydration.   TSH wnl  Less concerned for pheo given normal BP.  Today still having episodes of 4-5 times of month that he can feel.     2.  Tobacco Use  Finished nicotine patch taper  Still smoking ~ 3 cigarettes/day    3.  Knee   Still having knee pain particularly with squatting. Though overall is pleased with improvement.     ROS:     Denies any new chest pain or shortness of breath.  No changes to urinary or bowel function.   See HPI.    No past medical history on file.  Social History     Tobacco Use    Smoking status: Every Day     Types: Cigarettes     Start date: 2009    Smokeless tobacco: Never    Tobacco comments:     2 cigarettes daily   Vaping Use    Vaping Use: Never used   Substance Use Topics    Alcohol use: Never    Drug use: Never     Current Outpatient Medications   Medication Sig Dispense Refill    propranolol (INDERAL) 10 MG Tab Take 1 Tablet by mouth every evening. 30 Tablet 3    omeprazole (PRILOSEC) 20 MG delayed-release capsule Take 1 Capsule by mouth every day. 30 Capsule 3    Omega-3 Fatty Acids (FISH OIL PO) Take  by mouth.      allopurinol (ZYLOPRIM) 100 MG Tab Take 1 Tablet by mouth every day. 30 Tablet 3    vitamin D3 (CHOLECALCIFEROL) 1000 Unit (25 mcg) Tab Take 1,000 Units by mouth every day.      calcium carbonate (OS-ROLANDO 500) 1250 (500 Ca) MG Tab Take 500 mg  "by mouth every day.       No current facility-administered medications for this visit.     Physical Exam:  /85 (BP Location: Left arm, Patient Position: Sitting, BP Cuff Size: Adult)   Pulse 80   Temp 36.1 °C (96.9 °F) (Temporal)   Ht 1.854 m (6' 1\")   Wt 109 kg (240 lb 12.8 oz)   SpO2 98%   BMI 31.77 kg/m²   General: Well developed, well nourished male, in no distress.  Eyes: Conjuntiva without any obvious injection or erythema.   Cardiovascular: Heart is regular with out murmur  Lungs: Clear to auscultation bilaterally. No wheezes, rhonci or crackles heard. Respiratory effort is normal.  Abd: Soft, non-tender  Ext: No edema      Assessment and Plan:   1. Tachycardia  Unclear etiology.  TSH wnl  Zio patch with sinus tach  Low concern pheo given normal BP  Will start low dose propranolol- recommend patient take 2 hours prior to bed.  Follow up in 5 weeks    - propranolol (INDERAL) 10 MG Tab; Take 1 Tablet by mouth every evening.  Dispense: 30 Tablet; Refill: 3  - Referral to Pulmonary and Sleep Medicine    2. Tobacco use  Ongoing use.   S/p nicotine patch taper.   Patient struggling with cutting out the habit.  Cessation counseling provided.    3. Snoring  Concern for possible sleep apnea which could be contributing to sinus tachycardia at night  - Referral to Pulmonary and Sleep Medicine    4. Hyperuricemia  Doing well on allopurinol.  Will be due to repeat labs in ~ 1 month  - Comp Metabolic Panel; Future  - URIC ACID; Future    5. Prediabetes  Hgb A1c 5.9 10/22  Diet and lifestyle modifications discussed   Previously seen by nutrition  - HEMOGLOBIN A1C; Future      Return in about 6 weeks (around 1/16/2023) for Long tacychardia and lab follow up.    Patient Instructions   Good to see today Gil!    Today we discussed your persistent sinus tachycardia.  At this point we have ruled out the most common underlying etiologies.  We will start a heart rate lowering medication called propranolol.  Recommend " taking this every night approximately 2 hours before bedtime.  Possible side effects would be low blood pressure, if you experience significant lightheadedness, dizziness, or feeling like you will pass out please stop taking the medication and if able check your blood pressure at home. If you feel like the medication is helping and would like to increase it feel free to reach out on my chart and we can increase the dose prior to our next visit.    In the meantime of also sent a referral to sleep medicine to assess for possible obstructive sleep apnea.  If present this also could be contributing to your tachycardic symptoms.    Lets plan to follow-up in approximately 5 weeks for tachycardia, and repeat labs.  Lab orders have been sent, please have these done a few days prior to her next visit.            Andie Mark M.D. PGY2  [facility]    This note was created using voice recognition software.  While every attempt is made to ensure accuracy of transcription, occasionally errors occur.

## 2022-12-06 NOTE — PATIENT INSTRUCTIONS
Good to see today for on!    Today we discussed your persistent sinus tachycardia.  At this point we have ruled out the most common underlying etiologies.  We will start a heart rate lowering medication called propranolol.  Recommend taking this every night approximately 2 hours before bedtime.  Possible side effects would be low blood pressure, if you experience significant lightheadedness, dizziness, or feeling like you will pass out please stop taking the medication and if able check your blood pressure at home. If you feel like the medication is helping and would like to increase it feel free to reach out on my chart and we can increase the dose prior to our next visit.    In the meantime of also sent a referral to sleep medicine to assess for possible obstructive sleep apnea.  If present this also could be contributing to your tachycardic symptoms.    Lets plan to follow-up in approximately 5 weeks for tachycardia, and repeat labs.  Lab orders have been sent, please have these done a few days prior to her next visit.

## 2022-12-13 NOTE — OP THERAPY DAILY TREATMENT
"  Outpatient Physical Therapy  DAILY TREATMENT     [facility] Outpatient Physical Therapy [address] [facility], [address]  [place] [address]  Phone:  [phone]    Date: 12/14/2022    Patient: Gil Haskins  YOB: 1990  MRN: [id number]     Time Calculation                   Chief Complaint: No chief complaint on file.    Visit #: 25    SUBJECTIVE:  The patient reports     OBJECTIVE:  Current objective measures:       Improve functional movement in dead-lifts and multifidi strength     Therapeutic Exercises (CPT 96011):     1. Asher deadlifts, 1x10 reps @ 10#    4. bridges (hamstring curls position), 1x10 reps 5 sec hold    6. leg press, 7B @ 1 x15 reps single stance on each    8. alternating lunges, 2 x 20 reps, with 20# each hand    9. eccentric quads, 2 x10 reps @ 6 #, 22.5 \" seat height    10. Upright bike, seat 8  L4.5  6 minutes    11. 3 way hip, 2 x10 reps (blue tb) each LE    12. supine oblique crunches, 1 x 10 each side    13. chair squats SL, 10x    14. trunk extension in prone, 10 x    15. superman alt UE/LE, 15x    17. monster walks, 2 x12 steps each (blue tb)    18. foam roller/ abs/ posterior tilts, 3'    19. BOSU squats, 2 x10 reps    Therapeutic Treatments and Modalities:     2. Neuromuscular Re-education (CPT 53313), (R) knee, steps, lunges, bridges, squats, clean and jerk using barbdenise deadlifts  Time-based treatments/modalities:         ASSESSMENT:   Response to treatment:           PLAN/RECOMMENDATIONS:   Plan for treatment: therapy treatment to continue next visit.  Planned interventions for next visit: continue with current treatment.         "

## 2022-12-14 ENCOUNTER — APPOINTMENT (OUTPATIENT)
Dept: PHYSICAL THERAPY | Facility: REHABILITATION | Age: 32
End: 2022-12-14
Payer: COMMERCIAL

## 2022-12-22 ENCOUNTER — APPOINTMENT (OUTPATIENT)
Dept: PHYSICAL THERAPY | Facility: REHABILITATION | Age: 32
End: 2022-12-22
Payer: COMMERCIAL

## 2022-12-22 NOTE — OP THERAPY DAILY TREATMENT
"  Outpatient Physical Therapy  DAILY TREATMENT     [facility] Outpatient Physical Therapy [place] [id number] [facility], [address]  [place] [address]  Phone:  [phone]    Date: 12/22/2022    Patient: Gil Haskins  YOB: 1990  MRN: [id number]     Time Calculation                   Chief Complaint: No chief complaint on file.    Visit #: 25    SUBJECTIVE:  The patient reports     OBJECTIVE:  Current objective measures:     Improve strength in lumbar multifidi; TrA's and functional lifting     Therapeutic Exercises (CPT 56650):     1. Asher deadlifts, 1x10 reps @ 10#    4. bridges (hamstring curls position), 1x10 reps 5 sec hold    6. leg press, 7B @ 1 x15 reps single stance on each    8. alternating lunges, 2 x 20 reps, with 20# each hand    9. eccentric quads, 2 x10 reps @ 6 #, 22.5 \" seat height    10. Upright bike, seat 8  L4.5  6 minutes    11. 3 way hip, 2 x10 reps (blue tb) each LE    12. supine oblique crunches, 1 x 10 each side    13. chair squats SL, 10x    14. trunk extension in prone, 10 x    15. superman alt UE/LE, 15x    17. monster walks, 2 x12 steps each (blue tb)    18. foam roller/ abs/ posterior tilts, 3'    19. BOSU squats, 2 x10 reps    Therapeutic Treatments and Modalities:     2. Neuromuscular Re-education (CPT 86820), (R) knee, steps, lunges, bridges, squats, clean and jerk using barbdenise deadlifts  Time-based treatments/modalities:           ASSESSMENT:   Response to treatment:           PLAN/RECOMMENDATIONS:   Plan for treatment: therapy treatment to continue next visit.  Planned interventions for next visit: continue with current treatment.         "

## 2022-12-29 ENCOUNTER — PHYSICAL THERAPY (OUTPATIENT)
Dept: PHYSICAL THERAPY | Facility: REHABILITATION | Age: 32
End: 2022-12-29
Payer: COMMERCIAL

## 2022-12-29 DIAGNOSIS — M25.561 ACUTE PAIN OF RIGHT KNEE: ICD-10-CM

## 2022-12-29 PROCEDURE — 97140 MANUAL THERAPY 1/> REGIONS: CPT

## 2022-12-29 PROCEDURE — 97110 THERAPEUTIC EXERCISES: CPT

## 2022-12-29 PROCEDURE — 97112 NEUROMUSCULAR REEDUCATION: CPT

## 2022-12-29 NOTE — OP THERAPY DAILY TREATMENT
"  Outpatient Physical Therapy  DAILY TREATMENT     [facility] Outpatient Physical Therapy [place]  [address] [facility], [address]  [place] [address]  Phone:  [phone]    Date: 12/29/2022    Patient: Gil Haskins  YOB: 1990  MRN: [id number]     Time Calculation    Start time: 0800  Stop time: 0845 Time Calculation (min): 45 minutes         Chief Complaint: Back Problem    Visit #: 25    SUBJECTIVE:  The patient reports having increased lower back pain lately; (R) knee still gives him occasional pain at times.    OBJECTIVE:  Current objective measures:       Improve stability in (R) knee with squatting and functional lunges; decrease mid thoracic-lumbar pain    Therapeutic Exercises (CPT 51804):     1. Asher deadlifts, 1x10 reps @ 10#    2. chair lat rows, 1 x10 reps each @ 15#    3. overhead lifts    4. bridges (hamstring curls position), 1x10 reps 5 sec hold    5. shoulder retractions, 1 x20 reps (orange tb)    6. leg press, 7B @ 1 x15 reps single stance on each    7. standing flies, 1 x10 reps (black tb)    8. alternating lunges, 2 x 20 reps, with 20# each hand    9. eccentric quads, 2 x10 reps @ 6 #, 22.5 \" seat height    10. Upright bike, seat 8  L4.5  6 minutes    11. 3 way hip, 2 x10 reps (blue tb) each LE    12. supine oblique crunches, 1 x 10 each side    13. chair squats SL, 10x    14. trunk extension in prone, 10 x    15. superman alt UE/LE, 12x    16. open the book, x    17. monster walks, 2 x12 steps each (blue tb)    18. foam roller/ abs/ posterior tilts, 3'    19. BOSU squats, 2 x10 reps    Therapeutic Treatments and Modalities:     2. Neuromuscular Re-education (CPT 49569), (R) knee, steps, lunges, bridges, squats, clean and jerk using barbell, deadlifts  Time-based treatments/modalities:    Physical Therapy Timed Treatment Charges  Manual therapy minutes (CPT 55645): 15 minutes  Neuromusc re-ed, balance, coor, post minutes (CPT 44366): 15 minutes  Therapeutic exercise minutes (CPT 85235): 15 " minutes      ASSESSMENT:   Response to treatment:   PA's to T4-T5 grade 2-3 tenderness at T4 to the (R) side; effleurage to the rhomboids; mid traps and upper traps. Self care instructions to self mobilize thoracic spine with over the chair extensions. Patient tolerated well       PLAN/RECOMMENDATIONS:   Plan for treatment: therapy treatment to continue next visit.  Planned interventions for next visit: continue with current treatment.

## 2023-01-06 ENCOUNTER — PHYSICAL THERAPY (OUTPATIENT)
Dept: PHYSICAL THERAPY | Facility: REHABILITATION | Age: 33
End: 2023-01-06
Payer: COMMERCIAL

## 2023-01-06 DIAGNOSIS — M25.561 ACUTE PAIN OF RIGHT KNEE: ICD-10-CM

## 2023-01-06 PROCEDURE — 97112 NEUROMUSCULAR REEDUCATION: CPT

## 2023-01-06 PROCEDURE — 97110 THERAPEUTIC EXERCISES: CPT

## 2023-01-06 PROCEDURE — 97140 MANUAL THERAPY 1/> REGIONS: CPT

## 2023-01-06 NOTE — OP THERAPY DAILY TREATMENT
"  Outpatient Physical Therapy  DAILY TREATMENT     [facility] Outpatient Physical Therapy [place]  [employer] [facility], [address]  [place] [address]  Phone:  [phone]    Date: 01/06/2023    Patient: Gil Haskins  YOB: 1990  MRN: [id number]     Time Calculation    Start time: 0845  Stop time: 0930 Time Calculation (min): 45 minutes         Chief Complaint: Back Problem    Visit #: 26    SUBJECTIVE:  The patient reports having pain in his mid back sleeping or lying down.    OBJECTIVE:  Current objective measures:       Improve strength to mid to upper traps; decrease tightness in postural muscles     Therapeutic Exercises (CPT 85708):     1. Asher deadlifts, 1x10 reps @ 10#    2. chair lat rows, 1 x10 reps each @ 15#    3. overhead lifts, 2 x10 reps (green tb)    4. bridges (hamstring curls position), 1x10 reps 5 sec hold    5. shoulder retractions, 1 x20 reps (orange tb)    6. leg press, 7B @ 1 x15 reps single stance on each    7. standing flies, 1 x10 reps (black tb)    8. alternating lunges, 2 x 20 reps, with 20# each hand    9. eccentric quads, 2 x10 reps @ 6 #, 22.5 \" seat height    10. Upright bike, seat 8  L4.5  6 minutes    11. 3 way hip, 2 x10 reps (blue tb) each LE    12. supine oblique crunches, 1 x 10 each side    13. chair squats SL, 10x    14. trunk extension in prone, 10 x    15. superman alt UE/LE, 12x    16. open the book, x    17. monster walks, 2 x12 steps each (blue tb)    18. foam roller/ abs/ posterior tilts, 3'    19. BOSU squats, 2 x10 reps    Therapeutic Treatments and Modalities:     2. Neuromuscular Re-education (CPT 95801), (R) knee, steps, lunges, bridges, squats, clean and jerk using barbell, deadlifts  Time-based treatments/modalities:    Physical Therapy Timed Treatment Charges  Manual therapy minutes (CPT 65116): 15 minutes  Neuromusc re-ed, balance, coor, post minutes (CPT 30365): 15 minutes  Therapeutic exercise minutes (CPT 60083): 15 minutes  ASSESSMENT:   Response to " "treatment:   CPA's grade 2-3 to T4-5; tenderness at T4 to the (R) side; STM to the upper traps mid traps with no pain during moderate pressure to the area.Patient experienced \"cramping\" to the (R) oblique and then (L) oblique region while attempting to perform thoracic stretches.        PLAN/RECOMMENDATIONS:   Plan for treatment: therapy treatment to continue next visit.  Planned interventions for next visit: continue with current treatment.         "

## 2023-01-10 ENCOUNTER — PHYSICAL THERAPY (OUTPATIENT)
Dept: PHYSICAL THERAPY | Facility: REHABILITATION | Age: 33
End: 2023-01-10
Payer: COMMERCIAL

## 2023-01-10 DIAGNOSIS — M25.561 ACUTE PAIN OF RIGHT KNEE: ICD-10-CM

## 2023-01-10 PROCEDURE — 97140 MANUAL THERAPY 1/> REGIONS: CPT

## 2023-01-10 PROCEDURE — 97112 NEUROMUSCULAR REEDUCATION: CPT

## 2023-01-10 PROCEDURE — 97110 THERAPEUTIC EXERCISES: CPT

## 2023-01-11 ENCOUNTER — HOSPITAL ENCOUNTER (OUTPATIENT)
Dept: LAB | Facility: MEDICAL CENTER | Age: 33
End: 2023-01-11
Payer: COMMERCIAL

## 2023-01-11 DIAGNOSIS — R73.03 PREDIABETES: ICD-10-CM

## 2023-01-11 DIAGNOSIS — E79.0 HYPERURICEMIA: ICD-10-CM

## 2023-01-11 LAB
ALBUMIN SERPL BCP-MCNC: 4.6 G/DL (ref 3.2–4.9)
ALBUMIN/GLOB SERPL: 1.6 G/DL
ALP SERPL-CCNC: 68 U/L (ref 30–99)
ALT SERPL-CCNC: 22 U/L (ref 2–50)
ANION GAP SERPL CALC-SCNC: 10 MMOL/L (ref 7–16)
AST SERPL-CCNC: 17 U/L (ref 12–45)
BILIRUB SERPL-MCNC: 0.7 MG/DL (ref 0.1–1.5)
BUN SERPL-MCNC: 15 MG/DL (ref 8–22)
CALCIUM ALBUM COR SERPL-MCNC: 9.1 MG/DL (ref 8.5–10.5)
CALCIUM SERPL-MCNC: 9.6 MG/DL (ref 8.5–10.5)
CHLORIDE SERPL-SCNC: 100 MMOL/L (ref 96–112)
CO2 SERPL-SCNC: 26 MMOL/L (ref 20–33)
CREAT SERPL-MCNC: 0.99 MG/DL (ref 0.5–1.4)
EST. AVERAGE GLUCOSE BLD GHB EST-MCNC: 126 MG/DL
GFR SERPLBLD CREATININE-BSD FMLA CKD-EPI: 104 ML/MIN/1.73 M 2
GLOBULIN SER CALC-MCNC: 2.9 G/DL (ref 1.9–3.5)
GLUCOSE SERPL-MCNC: 104 MG/DL (ref 65–99)
HBA1C MFR BLD: 6 % (ref 4–5.6)
POTASSIUM SERPL-SCNC: 4.2 MMOL/L (ref 3.6–5.5)
PROT SERPL-MCNC: 7.5 G/DL (ref 6–8.2)
SODIUM SERPL-SCNC: 136 MMOL/L (ref 135–145)
URATE SERPL-MCNC: 8.5 MG/DL (ref 2.5–8.3)

## 2023-01-11 PROCEDURE — 83036 HEMOGLOBIN GLYCOSYLATED A1C: CPT

## 2023-01-11 PROCEDURE — 36415 COLL VENOUS BLD VENIPUNCTURE: CPT

## 2023-01-11 PROCEDURE — 80053 COMPREHEN METABOLIC PANEL: CPT

## 2023-01-11 PROCEDURE — 84550 ASSAY OF BLOOD/URIC ACID: CPT

## 2023-01-12 ENCOUNTER — PHYSICAL THERAPY (OUTPATIENT)
Dept: PHYSICAL THERAPY | Facility: REHABILITATION | Age: 33
End: 2023-01-12
Payer: COMMERCIAL

## 2023-01-12 DIAGNOSIS — M25.561 ACUTE PAIN OF RIGHT KNEE: ICD-10-CM

## 2023-01-12 PROCEDURE — 97140 MANUAL THERAPY 1/> REGIONS: CPT

## 2023-01-12 PROCEDURE — 97110 THERAPEUTIC EXERCISES: CPT

## 2023-01-12 PROCEDURE — 97112 NEUROMUSCULAR REEDUCATION: CPT

## 2023-01-13 NOTE — OP THERAPY DAILY TREATMENT
"  Outpatient Physical Therapy  DAILY TREATMENT     [facility] Outpatient Physical Therapy [place]  [employer] [facility], [address]  [place] [address]  Phone:  [phone]    Date: 01/12/2023    Patient: Gil Haskins  YOB: 1990  MRN: [id number]     Time Calculation    Start time: 0415  Stop time: 0500 Time Calculation (min): 45 minutes         Chief Complaint: Knee Problem and Back Problem    Visit #: 28    SUBJECTIVE:  The patient has more pain between the shoulders but a little less     OBJECTIVE:  Current objective measures:       Decrease stiffness/tightness to the thoracic region    Therapeutic Exercises (CPT 75237):     1. Asher deadlifts, 1x10 reps @ 10#    2. chair lat rows, 2 x10 reps each @ 20#, x    3. overhead lifts, 2 x10 reps (green tb), blue tb next visit    4. bridges (hamstring curls position), 1x10 reps 5 sec hold    5. shoulder retractions, 1 x20 reps (orange tb), x    6. leg press, 7B @ 1 x15 reps single stance on each    7. standing flies, 1 x10 reps (black tb), x    8. alternating lunges, 2 x 20 reps, with 20# each hand    9. eccentric quads, 2 x10 reps @ 6 #, 22.5 \" seat height    10. Upright bike, seat 8  L4.5  4 minutes    11. 3 way hip, 2 x10 reps (blue tb) each LE    12. supine oblique crunches, 1 x 10 each side    13. chair squats SL, 10x    14. trunk rotations PNF D1/D2, x    15. superman alt UE/LE, 12x, x    16. open the book, 5x 10 sec, x    17. PNF D1/D2 sh extension/flex, 2 x12 steps each (blue tb)    18. foam roller/ abs/ posterior tilts, 3'    19. BOSU squats, 2 x10 reps    20. push ups angled on exam table, 10x, x    Therapeutic Treatments and Modalities:     2. Neuromuscular Re-education (CPT 81735), (R) knee, steps, lunges, bridges, squats, clean and jerk using barbell, deadlifts  Time-based treatments/modalities:  Physical Therapy Timed Treatment Charges  Manual therapy minutes (CPT 82590): 15 minutes  Neuromusc re-ed, balance, coor, post minutes (CPT 83514): 15 " minutes  Therapeutic exercise minutes (CPT 87409): 15 minutes  ASSESSMENT:   Response to treatment:   CPA's grade 3-4 less soreness/tenderness at T-T3; patient increased upper thoracic trunk rotation to the (R) side compared to contralateral with less pain   Next visit: Olympic lifts for thoracic conditioning; Barbell rows    PLAN/RECOMMENDATIONS:   Plan for treatment: therapy treatment to continue next visit.  Planned interventions for next visit: continue with current treatment.

## 2023-01-16 ENCOUNTER — TELEPHONE (OUTPATIENT)
Dept: HEALTH INFORMATION MANAGEMENT | Facility: OTHER | Age: 33
End: 2023-01-16
Payer: COMMERCIAL

## 2023-01-17 ENCOUNTER — PHYSICAL THERAPY (OUTPATIENT)
Dept: PHYSICAL THERAPY | Facility: REHABILITATION | Age: 33
End: 2023-01-17
Payer: COMMERCIAL

## 2023-01-17 DIAGNOSIS — M25.561 ACUTE PAIN OF RIGHT KNEE: ICD-10-CM

## 2023-01-17 PROCEDURE — 97140 MANUAL THERAPY 1/> REGIONS: CPT

## 2023-01-17 PROCEDURE — 97110 THERAPEUTIC EXERCISES: CPT

## 2023-01-17 NOTE — OP THERAPY DAILY TREATMENT
"  Outpatient Physical Therapy  DAILY TREATMENT     [facility] Outpatient Physical Therapy [place]  [employer] [facility], [address]  [place] [address]  Phone:  [phone]    Date: 01/17/2023    Patient: Gil Haskins  YOB: 1990  MRN: [id number]     Time Calculation                   Chief Complaint: No chief complaint on file.    Visit #: 29    SUBJECTIVE:  The patient reports having continued pain to the upper to middle back while he is sitting and standing with prolonged positions.    OBJECTIVE:  Current objective measures:       Decrease tightness/soreness in thoracic region; improve mobility     Therapeutic Exercises (CPT 27864):     1. Asher deadlifts, 1x10 reps @ 10#    2. chair lat rows, 2 x10 reps each @ 20#, x    3. overhead lifts, 2 x10 reps (green tb), blue tb next visit    4. bridges (hamstring curls position), 1x10 reps 5 sec hold    5. shoulder retractions, 1 x20 reps (orange tb), x    6. leg press, 7B @ 1 x15 reps single stance on each    7. standing flies, 1 x10 reps (black tb), x    8. alternating lunges, 2 x 20 reps, with 20# each hand    9. eccentric quads, 2 x10 reps @ 6 #, 22.5 \" seat height    10. Upright bike, seat 8  L4.5  5 minutes    11. 3 way hip, 2 x10 reps (blue tb) each LE    12. supine oblique crunches, 1 x 10 each side    13. chair squats SL, 10x    14. trunk rotations PNF D1/D2, x    15. superman alt UE/LE, 12x, x    16. open the book, 5x 10 sec, x    17. PNF D1/D2 sh extension/flex, 2 x12 steps each (blue tb)    18. foam roller/ abs/ posterior tilts, 3', x    19. BOSU squats, 2 x10 reps    20. push ups angled on exam table, 10x, x    Therapeutic Treatments and Modalities:     2. Neuromuscular Re-education (CPT 24596), (R) knee, steps, lunges, bridges, squats, clean and jerk using barbell, deadlifts  Time-based treatments/modalities:       ASSESSMENT:   Response to treatment:   Manual therapy techniques performed with STM to the thoracic paraspinals. Patient had less tenderness today " at T3-4-5 to the (R) side. Patient was educated on performing power cleans and clean and jerk power-lifting exercises. Patient tolerated well.     PLAN/RECOMMENDATIONS:   Plan for treatment: therapy treatment to continue next visit.  Planned interventions for next visit: continue with current treatment.

## 2023-01-18 ENCOUNTER — OFFICE VISIT (OUTPATIENT)
Dept: INTERNAL MEDICINE | Facility: OTHER | Age: 33
End: 2023-01-18
Payer: COMMERCIAL

## 2023-01-18 VITALS
WEIGHT: 231.4 LBS | DIASTOLIC BLOOD PRESSURE: 87 MMHG | TEMPERATURE: 98.2 F | HEIGHT: 73 IN | OXYGEN SATURATION: 97 % | HEART RATE: 85 BPM | BODY MASS INDEX: 30.67 KG/M2 | SYSTOLIC BLOOD PRESSURE: 126 MMHG

## 2023-01-18 DIAGNOSIS — E78.1 HYPERTRIGLYCERIDEMIA: ICD-10-CM

## 2023-01-18 DIAGNOSIS — R00.0 TACHYCARDIA: ICD-10-CM

## 2023-01-18 DIAGNOSIS — M54.6 CHRONIC MIDLINE THORACIC BACK PAIN: ICD-10-CM

## 2023-01-18 DIAGNOSIS — G89.29 CHRONIC MIDLINE THORACIC BACK PAIN: ICD-10-CM

## 2023-01-18 DIAGNOSIS — R73.03 PREDIABETES: ICD-10-CM

## 2023-01-18 DIAGNOSIS — E79.0 HYPERURICEMIA: ICD-10-CM

## 2023-01-18 PROCEDURE — 99213 OFFICE O/P EST LOW 20 MIN: CPT | Mod: GE

## 2023-01-18 RX ORDER — ALLOPURINOL 200 MG/1
200 TABLET ORAL DAILY
Qty: 90 TABLET | Refills: 3 | Status: SHIPPED | OUTPATIENT
Start: 2023-01-18 | End: 2023-02-24 | Stop reason: SDUPTHER

## 2023-01-18 RX ORDER — PROPRANOLOL HYDROCHLORIDE 10 MG/1
20 TABLET ORAL EVERY EVENING
Qty: 30 TABLET | Refills: 3 | Status: SHIPPED | OUTPATIENT
Start: 2023-01-18 | End: 2023-04-19 | Stop reason: SDUPTHER

## 2023-01-18 ASSESSMENT — FIBROSIS 4 INDEX: FIB4 SCORE: 0.4

## 2023-01-18 ASSESSMENT — PATIENT HEALTH QUESTIONNAIRE - PHQ9: CLINICAL INTERPRETATION OF PHQ2 SCORE: 0

## 2023-01-18 NOTE — PROGRESS NOTES
Established Patient    Patient Care Team:  Andie Mark M.D. as PCP - General (Internal Medicine)  Keaton Hudson, PT as Physical Therapist (Physical Therapy)    Gil Haskins is a 32 y.o. male who presents today with the following Chief Complaint(s): Follow up for Diagnoses of Tachycardia, Prediabetes, Hyperuricemia, Chronic midline thoracic back pain, and Hypertriglyceridemia were pertinent to this visit.    HPI:  Problem   Chronic Midline Thoracic Back Pain    Started ~11/2022  Non radiating   No inciting injury  Initially was triggered by sleeping posture  Sleeps on floor- haven't been able to find a comfortable mattress  No weakness, no numbness, tinlging.   No loss of bowel or bladder  Has been doing PT for back pain for 4 sessions.  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  Still eating lots of white rice.        Tachycardia    Unclear etiology.   Zio patch 05/21/22 without concern for arrythmia; showed sinus tachy average rate of 97, max 175, and 1 episode of SVT lasting 5 beats.   Have considered possible nicotine/caffeine involvement vs panic attacks vs dehydration.   TSH wnl  Less concerned for pheo given normal BP.  No related shortness of breath, sweating, or other associated symptoms.   Was started on propranolol at last visit, 10mg every evening. As helped some but still having HR up to 105 that patients reports is uncomfortable.        Hyperuricemia    History of gout  Last flare in 2022  On allopurinol 100mg  Recent uric acid 1/2023  Improved but still elevated at 8.5          ROS:     Denies any new chest pain or shortness of breath.  No changes to urinary or bowel function.   See HPI.    No past medical history on file.  Social History     Tobacco Use    Smoking status: Every Day     Types: Cigarettes     Start date:  "2009    Smokeless tobacco: Never    Tobacco comments:     5 cigarettes daily   Vaping Use    Vaping Use: Never used   Substance Use Topics    Alcohol use: Never    Drug use: Never     Current Outpatient Medications   Medication Sig Dispense Refill    propranolol (INDERAL) 10 MG Tab Take 2 Tablets by mouth every evening. 30 Tablet 3    allopurinol 200 MG Tab Take 200 mg by mouth every day. 90 Tablet 3    omeprazole (PRILOSEC) 20 MG delayed-release capsule Take 1 Capsule by mouth every day. 30 Capsule 3    Omega-3 Fatty Acids (FISH OIL PO) Take  by mouth.      vitamin D3 (CHOLECALCIFEROL) 1000 Unit (25 mcg) Tab Take 1,000 Units by mouth every day.      calcium carbonate (OS-ROLANDO 500) 1250 (500 Ca) MG Tab Take 500 mg by mouth every day.       No current facility-administered medications for this visit.     Physical Exam:  /87 (BP Location: Left arm, Patient Position: Sitting, BP Cuff Size: Adult)   Pulse 85   Temp 36.8 °C (98.2 °F) (Temporal)   Ht 1.854 m (6' 1\")   Wt 105 kg (231 lb 6.4 oz)   SpO2 97%   BMI 30.53 kg/m²   General: Well developed, well nourished male, in no distress.  Eyes: Conjuntiva without any obvious injection or erythema.   Cardiovascular: Heart is regular with out murmur  Lungs: Clear to auscultation bilaterally. No wheezes, rhonci or crackles heard. Respiratory effort is normal.  Abd: Soft, non-tender  Ext: No edema  MSK: mild paraspinal tenderness to palpation mid thoracic. No midline tenderness. 5/5 strength in bilateral LE.    Assessment and Plan:   1. Tachycardia  Most likely inappropriate sinus tach.  Improvement with propranolol though still with some tacychardia.  Increase to 20 mg daily.    - propranolol (INDERAL) 10 MG Tab; Take 2 Tablets by mouth every evening.  Dispense: 30 Tablet; Refill: 3    2. Prediabetes  Lifestyle modifications discussed  Pre-diabetic diet hand out provided  Repeat A1c in 3 months  - HEMOGLOBIN A1C; Future    3. Hyperuricemia  Tolerating " allopurinol  CMP without evidence of kidney or liver dysfunction  Uric acid not at goal.  Increase allopurinol to 200mg  Repeat labs in 3 months.    - allopurinol 200 MG Tab; Take 200 mg by mouth every day.  Dispense: 90 Tablet; Refill: 3  - Comp Metabolic Panel; Future  - URIC ACID; Future    4. Chronic midline thoracic back pain  Most likely muscoloskeletal 2/2 to poor posture and sleeping habits.  Reviewed proper posture, stretches and exercise  Hand out for conservative measures provided  Advise finding a firm mattress for sleep  Continue PT  No imaging indicated at this time  Follow up after completion of 12 sessions of PT.     5. Hypertriglyceridemia  Improving on last labs.  Reviewed diet and lifestyle modifications  Repeat labs in 3 months.    - Lipid Profile; Future      Return in about 3 months (around 4/10/2023) for follow labs and back pain.    Patient Instructions   Is good to see you today Gil!    Today we reviewed your recent lab work.    Recommend continue work on your diet for your blood sugar levels.    Your uric acid levels were little elevated still.  Have increased your allopurinol dose.  Please take this as prescribed.    Given you are continuing to have sinus tachycardia in the evenings have increased your propranolol to 20 mg.  Feel free to take only 10 if you feel is necessary.    For your back pain, recommend continuing physical therapy exercises for now.  Can also take over-the-counter Tylenol or as tolerated ibuprofen for pain relief.  Please see handout for additional stretches and exercise recommendations.  Can also try a yoga strap or other support device to help with proper posturing position.    Lets repeat your labs in 3 months and follow-up at that time.  Feel free to come in sooner if needed.        Andie Mark M.D. PGY2  [facility]    This note was created using voice recognition software.  While every attempt is made to ensure accuracy  of transcription, occasionally errors occur.

## 2023-01-18 NOTE — PATIENT INSTRUCTIONS
Is good to see you today Concepcion!    Today we reviewed your recent lab work.    Recommend continue work on your diet for your blood sugar levels.    Your uric acid levels were little elevated still.  Have increased your allopurinol dose.  Please take this as prescribed.    Given you are continuing to have sinus tachycardia in the evenings have increased your propranolol to 20 mg.  Feel free to take only 10 if you feel is necessary.    For your back pain, recommend continuing physical therapy exercises for now.  Can also take over-the-counter Tylenol or as tolerated ibuprofen for pain relief.  Please see handout for additional stretches and exercise recommendations.  Can also try a yoga strap or other support device to help with proper posturing position.    Lets repeat your labs in 3 months and follow-up at that time.  Feel free to come in sooner if needed.

## 2023-01-19 ENCOUNTER — PHYSICAL THERAPY (OUTPATIENT)
Dept: PHYSICAL THERAPY | Facility: REHABILITATION | Age: 33
End: 2023-01-19
Payer: COMMERCIAL

## 2023-01-19 DIAGNOSIS — M25.561 ACUTE PAIN OF RIGHT KNEE: ICD-10-CM

## 2023-01-19 PROCEDURE — 97140 MANUAL THERAPY 1/> REGIONS: CPT

## 2023-01-19 PROCEDURE — 97110 THERAPEUTIC EXERCISES: CPT

## 2023-01-19 PROCEDURE — 97112 NEUROMUSCULAR REEDUCATION: CPT

## 2023-01-19 NOTE — OP THERAPY DAILY TREATMENT
"  Outpatient Physical Therapy  DAILY TREATMENT     [facility] Outpatient Physical Therapy [place]  [employer] [facility], [address]  [place] [address]  Phone:  [phone]    Date: 01/19/2023    Patient: Gil Haskins  YOB: 1990  MRN: [id number]     Time Calculation    Start time: 0415  Stop time: 0500 Time Calculation (min): 45 minutes         Chief Complaint: Back Problem and Knee Problem    Visit #: 30    SUBJECTIVE:  The patient reports less pain to the upper back and shoulder area today. Some soreness across the shoulder and neck still from last treatment exercises maybe power clean and jerk.    OBJECTIVE:  Current objective measures:       Improve posture during Olympic lifts     Therapeutic Exercises (CPT 12591):     1. Asher deadlifts, 1x10 reps @ 10#    2. chair lat rows, 2 x10 reps each @ 20#, x    3. overhead lifts, 2 x10 reps (green tb), blue tb next visit    4. bridges (hamstring curls position), 1x10 reps 5 sec hold    5. shoulder retractions, 1 x20 reps (orange tb), x    6. leg press, 7B @ 1 x15 reps single stance on each    7. standing flies, 1 x10 reps (black tb), x    8. alternating lunges, 2 x 20 reps, with 20# each hand    9. eccentric quads, 2 x10 reps @ 6 #, 22.5 \" seat height    10. Upright bike, seat 8  L4.5  5 minutes    11. wall lift offs  (v lift off), xx    12. supine oblique crunches, 1 x 10 each side    13. chair squats SL, 10x    14. trunk rotations PNF D1/D2, x    15. superman alt UE/LE, 12x, x    16. open the book, 5x 10 sec, x    17. PNF D1/D2 sh extension/flex, 2 x12 steps each (blue tb), xx    18. foam roller/ abs/ posterior tilts, 3', x    19. BOSU squats, 2 x10 reps    20. push ups angled on exam table, 10x, x    Therapeutic Treatments and Modalities:     2. Neuromuscular Re-education (CPT 29243), (R) knee, steps, lunges, bridges, squats, clean and jerk using barbdenise, deadlifts  Time-based treatments/modalities:    Physical Therapy Timed Treatment Charges  Manual therapy minutes " (CPT 57308): 15 minutes  Neuromusc re-ed, balance, coor, post minutes (CPT 32845): 15 minutes  Therapeutic exercise minutes (CPT 16207): 15 minutes      ASSESSMENT:   Response to treatment:   Manual therapy techniques applied: CPA grade 4-5 mobs at T3-T6; patient had multiple cavitations though thoracic spine. Patient tolerated well with less connective tissue tension following application of interventions. The patient completed improved form performing power cleans and clean and jerk Olympic lifts    PLAN/RECOMMENDATIONS:   Plan for treatment: therapy treatment to continue next visit.  Planned interventions for next visit: continue with current treatment.

## 2023-01-24 ENCOUNTER — PHYSICAL THERAPY (OUTPATIENT)
Dept: PHYSICAL THERAPY | Facility: REHABILITATION | Age: 33
End: 2023-01-24
Payer: COMMERCIAL

## 2023-01-24 DIAGNOSIS — M25.561 ACUTE PAIN OF RIGHT KNEE: ICD-10-CM

## 2023-01-24 PROCEDURE — 97110 THERAPEUTIC EXERCISES: CPT

## 2023-01-24 PROCEDURE — 97140 MANUAL THERAPY 1/> REGIONS: CPT

## 2023-01-24 PROCEDURE — 97112 NEUROMUSCULAR REEDUCATION: CPT

## 2023-01-24 NOTE — OP THERAPY DAILY TREATMENT
"  Outpatient Physical Therapy  DAILY TREATMENT     [facility] Outpatient Physical Therapy [place]  [employer] [facility], [address]  [place] [address]  Phone:  [phone]    Date: 01/24/2023    Patient: Gil Haskins  YOB: 1990  MRN: [id number]     Time Calculation    Start time: 0415  Stop time: 0500 Time Calculation (min): 45 minutes         Chief Complaint: Back Problem    Visit #: 31    SUBJECTIVE:  The patient reports having less pain to his mid back since this past weekend    OBJECTIVE:  Current objective measures:       Improve strength in clean and jerk/ power clean with correct form; squatting and deadl-lifts     Therapeutic Exercises (CPT 29656):     1. prone on ball rollouts    2. chair lat rows, 2 x10 reps each @ 20#, x    3. overhead lifts, 2 x10 reps (green tb), blue tb next visit    4. bridges (hamstring curls position), 1x10 reps 5 sec hold    5. shoulder retractions, 1 x20 reps (orange tb), x    6. leg press, 7B @ 1 x15 reps single stance on each    7. standing flies, 1 x10 reps (black tb), xx    8. alternating lunges, 2 x 20 reps, with 20# each hand    9. eccentric quads, 2 x10 reps @ 6 #, 22.5 \" seat height    10. Upright bike, seat 8  L4.5  5 minutes    11. wall lift offs  (v lift off), 1 x5 reps (green tb), xx    12. power clean, 2 x10 reps @ 75#    13. clean and jerk, 2 x10 reps @ 65#    14. trunk rotations PNF D1/D2, xx    15. superman alt UE/LE, 12x, xx    16. open the book, 5x 10 sec, x    17. PNF D1/D2 sh extension/flex, 2 x12 steps each (blue tb), xx    18. foam roller/ abs/ posterior tilts, 3', x    19. BOSU squats, 2 x10 reps    20. push ups angled on exam table, 10x, x    Therapeutic Treatments and Modalities:     2. Neuromuscular Re-education (CPT 81190), (R) knee, steps, lunges, bridges, squats, clean and jerk using barbell, deadlifts  Time-based treatments/modalities:    Physical Therapy Timed Treatment Charges  Manual therapy minutes (CPT 03979): 15 minutes  Neuromusc re-ed, " balance, coor, post minutes (CPT 47878): 15 minutes  Therapeutic exercise minutes (CPT 59030): 15 minutes    ASSESSMENT:   Response to treatment:   STM to the thoracic paraspinals; rhomboids, mid to upper traps; soft tissue restriction along ridge of scapula to (L) side. Observed patient perform squats using barbell; verbal cues directed to improve hip hinge motion keeping back flat and head directed in extension looking up at the ceiling. Tolerated well. Reports some (R knee pain during descending motion.    PLAN/RECOMMENDATIONS:   Plan for treatment: therapy treatment to continue next visit.  Planned interventions for next visit: continue with current treatment.

## 2023-01-26 ENCOUNTER — PHYSICAL THERAPY (OUTPATIENT)
Dept: PHYSICAL THERAPY | Facility: REHABILITATION | Age: 33
End: 2023-01-26
Payer: COMMERCIAL

## 2023-01-26 DIAGNOSIS — M25.561 ACUTE PAIN OF RIGHT KNEE: ICD-10-CM

## 2023-01-26 PROCEDURE — 97110 THERAPEUTIC EXERCISES: CPT

## 2023-01-26 PROCEDURE — 97140 MANUAL THERAPY 1/> REGIONS: CPT

## 2023-01-27 NOTE — OP THERAPY DAILY TREATMENT
"  Outpatient Physical Therapy  DAILY TREATMENT     [facility] Outpatient Physical Therapy [place]  [employer], [address]  [place] [address]  Phone:  [phone]    Date: 01/26/2023    Patient: Gil Haskins  YOB: 1990  MRN: [id number]     Time Calculation    Start time: 0415  Stop time: 0500 Time Calculation (min): 45 minutes         Chief Complaint: Back Problem    Visit #: 32    SUBJECTIVE:  The patient reports continued thoracic pain occurring while at work and home; he is getting a new mattress in 2 weeks    OBJECTIVE:  Current objective measures:       Improve postural positioning ans correct lifting form during Olympic squats    Therapeutic Exercises (CPT 26562):     1. prone on ball rollouts    2. chair lat rows, 2 x10 reps each @ 20#, x    3. overhead lifts, 2 x10 reps (green tb), blue tb next visit    4. bridges (hamstring curls position), 1x10 reps 5 sec hold    5. shoulder retractions, 1 x20 reps (orange tb), x    6. leg press, 7B @ 1 x15 reps single stance on each    7. standing flies, 1 x10 reps (black tb), xx    8. alternating lunges, 2 x 20 reps, with 20# each hand    9. eccentric quads, 2 x10 reps @ 6 #, 22.5 \" seat height    10. Upright bike, seat 8  L4.5  5 minutes    11. wall lift offs  (v lift off), 1 x5 reps (green tb), xx    12. power clean, 2 x10 reps @ 75#    13. clean and jerk, 2 x10 reps @ 65#    14. trunk rotations PNF D1/D2, xx    15. superman alt UE/LE, 12x, xx    16. open the book, 5x 10 sec, x    17. PNF D1/D2 sh extension/flex, 2 x12 steps each (blue tb), xx    18. foam roller/ abs/ posterior tilts, 3', x    19. BOSU squats, 2 x10 reps    20. push ups angled on exam table, 10x, x    Therapeutic Treatments and Modalities:     2. Neuromuscular Re-education (CPT 84886), (R) knee, steps, lunges, bridges, squats, clean and jerk using barbell, deadlifts  Time-based treatments/modalities:    Physical Therapy Timed Treatment Charges  Manual therapy minutes (CPT 88117): 15 " minutes  Therapeutic exercise minutes (CPT 01315): 30 minutes      ASSESSMENT:   Response to treatment:     Patient was directed with self care instructions on proper Olympic lifting techniques during Power cleans ans Clean and Jerk lifts. Patient demonstrated poor lifting techniques during power cleans; verbal cuing to drop hips during lifting phase and explode through movement; needed to start lift with head up and hips dropped.      PLAN/RECOMMENDATIONS:   Plan for treatment: therapy treatment to continue next visit.  Planned interventions for next visit: continue with current treatment.

## 2023-01-31 ENCOUNTER — APPOINTMENT (OUTPATIENT)
Dept: PHYSICAL THERAPY | Facility: REHABILITATION | Age: 33
End: 2023-01-31
Payer: COMMERCIAL

## 2023-02-03 ENCOUNTER — PHYSICAL THERAPY (OUTPATIENT)
Dept: PHYSICAL THERAPY | Facility: REHABILITATION | Age: 33
End: 2023-02-03
Payer: COMMERCIAL

## 2023-02-03 DIAGNOSIS — M25.561 ACUTE PAIN OF RIGHT KNEE: ICD-10-CM

## 2023-02-03 PROCEDURE — 97110 THERAPEUTIC EXERCISES: CPT

## 2023-02-03 NOTE — OP THERAPY DAILY TREATMENT
Outpatient Physical Therapy  DAILY TREATMENT     [facility] Outpatient Physical Therapy [place] [id number] [facility], [address]  [place] [address]  Phone:  [phone]    Date: 02/03/2023    Patient: Gil Haskins  YOB: 1990  MRN: [id number]     Time Calculation    Start time: 0845  Stop time: 0930 Time Calculation (min): 45 minutes         Chief Complaint: knee and back pain  Visit #: 33    SUBJECTIVE:  Patient states he's having more mid back pain than knee pain.     OBJECTIVE:        Therapeutic Exercises (CPT 62210):     1. prone on ball rollouts    2. foam roller t/s stretching, alt flexion, pec stretch, horizontal roller t/s extension, ER with pink tband    3. overhead lifts, 2 x10 reps (green tb), blue tb next visit    4. bridges (hamstring curls position), 1x10 reps 5 sec hold    5. shoulder retractions, 1 x20 reps (orange tb), x    6. leg press, 7B @ 1 x15 reps single stance on each    7. standing flies, 1 x10 reps (black tb), xx    8. alternating lunges, 2 x 10 reps, with 20# each hand    9. scapular retraction with pink tband, 2 x 15    10. Upright bike, seat 8  L4.5  5 minutes    14. trunk rotations PNF D1/D2, xx    15. superman alt UE/LE, 12x, xx    16. open the book, 5x 10 sec, x, x, x    Time-based treatments/modalities:    Physical Therapy Timed Treatment Charges  Therapeutic exercise minutes (CPT 61334): 45 minutes          ASSESSMENT:   Response to treatment: pt needs cuing to demonstrate good body mechanics with exercises. Pt encouraged to buy a foam roller for home use and to add scapular retractions with pink tband 3x/daily on work days.     PLAN/RECOMMENDATIONS:   Plan for treatment: therapy treatment to continue next visit.  Planned interventions for next visit: neuromuscular re-education (CPT 50970) and therapeutic exercise (CPT 67645).

## 2023-02-07 ENCOUNTER — APPOINTMENT (OUTPATIENT)
Dept: PHYSICAL THERAPY | Facility: REHABILITATION | Age: 33
End: 2023-02-07
Payer: COMMERCIAL

## 2023-02-10 ENCOUNTER — PHYSICAL THERAPY (OUTPATIENT)
Dept: PHYSICAL THERAPY | Facility: REHABILITATION | Age: 33
End: 2023-02-10
Payer: COMMERCIAL

## 2023-02-10 DIAGNOSIS — M25.561 ACUTE PAIN OF RIGHT KNEE: ICD-10-CM

## 2023-02-10 PROCEDURE — 97140 MANUAL THERAPY 1/> REGIONS: CPT

## 2023-02-10 PROCEDURE — 97112 NEUROMUSCULAR REEDUCATION: CPT

## 2023-02-10 PROCEDURE — 97110 THERAPEUTIC EXERCISES: CPT

## 2023-02-10 NOTE — OP THERAPY DAILY TREATMENT
"  Outpatient Physical Therapy  DAILY TREATMENT     [facility] Outpatient Physical Therapy [place] [address], [address]  [place] [address]  Phone:  [phone]    Date: 02/10/2023    Patient: Gil Haskins  YOB: 1990  MRN: [id number]     Time Calculation    Start time: 0800  Stop time: 0845 Time Calculation (min): 45 minutes         Chief Complaint: Back Problem    Visit #: 34    SUBJECTIVE:  The patient reports continued pain to the upper back     OBJECTIVE:  Current objective measures:       Improve posture with Olympic lifts; increase strength     Therapeutic Exercises (CPT 49237):     1. prone on ball rollouts    2. chair lat rows, 2 x10 reps each @ 20#, x    3. overhead lifts, 2 x10 reps (blue tb)    4. bridges (hamstring curls position), 1x10 reps 5 sec hold    5. shoulder retractions, 1 x20 reps (orange tb), x    6. leg press, 7B @ 1 x15 reps single stance on each    7. standing flies, 1 x10 reps (black tb), xx    8. alternating lunges, 2 x 20 reps, with 20# each hand    9. eccentric quads, 2 x10 reps @ 6 #, 22.5 \" seat height    10. Upright bike, seat 8  L4.5  5 minutes    11. wall lift offs  (v lift off), 1 x5 reps (green tb), xx    12. power clean, 2 x10 reps @ 75#    13. clean and jerk, 2 x10 reps @ 65#    14. trunk rotations PNF D1/D2, xx    15. superman alt UE/LE, 12x, xx    16. open the book, 5x 10 sec, x    17. PNF D1/D2 sh extension/flex, 2 x12 steps each (blue tb), xx    18. foam roller/ abs/ posterior tilts, 3', x    19. BOSU squats, 2 x10 reps    20. push ups angled on exam table, 10x, x    Therapeutic Treatments and Modalities:     2. Neuromuscular Re-education (CPT 66587), (R) knee, steps, lunges, bridges, squats, clean and jerk using barbdenise deadlifts  Time-based treatments/modalities:    Physical Therapy Timed Treatment Charges  Manual therapy minutes (CPT 35841): 15 minutes  Neuromusc re-ed, balance, coor, post minutes (CPT 98146): 15 minutes  Therapeutic exercise minutes (CPT " 55001): 15 minutes    ASSESSMENT:   Response to treatment:     STM to the (L) upper traps; patient has increased adhesions and trigger points along scapular ridge. Performed power clean with verbal cue to drop hip as before activating initial lifting motion. Patient tolerated well with no (R) shoulder pain today.      PLAN/RECOMMENDATIONS:   Plan for treatment: therapy treatment to continue next visit.  Planned interventions for next visit: continue with current treatment.

## 2023-02-14 ENCOUNTER — PHYSICAL THERAPY (OUTPATIENT)
Dept: PHYSICAL THERAPY | Facility: REHABILITATION | Age: 33
End: 2023-02-14
Payer: COMMERCIAL

## 2023-02-14 DIAGNOSIS — G89.29 CHRONIC MIDLINE THORACIC BACK PAIN: ICD-10-CM

## 2023-02-14 DIAGNOSIS — M54.6 CHRONIC MIDLINE THORACIC BACK PAIN: ICD-10-CM

## 2023-02-14 PROCEDURE — 97112 NEUROMUSCULAR REEDUCATION: CPT

## 2023-02-14 PROCEDURE — 97110 THERAPEUTIC EXERCISES: CPT

## 2023-02-14 PROCEDURE — 97140 MANUAL THERAPY 1/> REGIONS: CPT

## 2023-02-14 NOTE — OP THERAPY DAILY TREATMENT
Outpatient Physical Therapy  DAILY TREATMENT     [facility] Outpatient Physical Therapy [place] [address] [address] [facility], [address]  [place] [address]  Phone:  [phone]    Date: 02/14/2023    Patient: Gil Haskins  YOB: 1990  MRN: [id number]     Time Calculation    Start time: 0330  Stop time: 0415 Time Calculation (min): 45 minutes         Chief Complaint: Back Problem    Visit #: 35    SUBJECTIVE:      OBJECTIVE:  Current objective measures:           Therapeutic Exercises (CPT 10647):     1. prone on ball rollouts, 2 x30 sec; 1 x40 sec    2. chair lat rows, 2 x10 reps each @ 20#, x    3. overhead lifts, 2 x10 reps (blue tb), next black tb    4. bridges (hamstring curls position), NT    5. shoulder retractions, 1 x20 reps (orange tb), x    7. standing flies, 1 x10 reps (black tb), xx    10. Upright bike, seat 8  L4.5  5 minutes    11. wall lift offs  (v lift off), 1 x5 reps (green tb), xx    12. power clean, 2 x10 reps @ 75#    13. clean and jerk, 2 x10 reps @ 65#    14. trunk rotations PNF D1/D2, xx    15. superman alt UE/LE, 12x, xx    16. open the book, 5x 10 sec, x    17. PNF D1/D2 sh extension/flex, 2 x12 steps each (blue tb), xx    18. foam roller/ abs/ posterior tilts, 3', x    20. push up +plus, 3 x3 reps, x    Therapeutic Treatments and Modalities:     1. Neuromuscular Re-education (CPT 40206), scapular movements, push up plus ; needed tactile cuing and verbal descriptions of proper sequencing patttern  Time-based treatments/modalities:    Physical Therapy Timed Treatment Charges  Manual therapy minutes (CPT 96676): 15 minutes  Neuromusc re-ed, balance, coor, post minutes (CPT 17971): 15 minutes  Therapeutic exercise minutes (CPT 59864): 15 minutes      ASSESSMENT:   Response to treatment:     STM to the upper traps, mid traps and rhomboids; tenderness to the (L) medial ridge along scapula. Self care instructions for proper posture during shoulder shrugs and overhead shoulder lifts; cuing to perform  cervical retractions to create neutral spine positioning. Also proper technique in push up plus; patient developed increased fatigue to triceps      PLAN/RECOMMENDATIONS:   Plan for treatment: therapy treatment to continue next visit.  Planned interventions for next visit: continue with current treatment.

## 2023-02-21 ENCOUNTER — PHYSICAL THERAPY (OUTPATIENT)
Dept: PHYSICAL THERAPY | Facility: REHABILITATION | Age: 33
End: 2023-02-21
Payer: COMMERCIAL

## 2023-02-21 DIAGNOSIS — M54.6 CHRONIC MIDLINE THORACIC BACK PAIN: ICD-10-CM

## 2023-02-21 DIAGNOSIS — G89.29 CHRONIC MIDLINE THORACIC BACK PAIN: ICD-10-CM

## 2023-02-21 PROCEDURE — 97140 MANUAL THERAPY 1/> REGIONS: CPT

## 2023-02-21 PROCEDURE — 97110 THERAPEUTIC EXERCISES: CPT

## 2023-02-21 NOTE — OP THERAPY DAILY TREATMENT
Outpatient Physical Therapy  DAILY TREATMENT     [facility] Outpatient Physical Therapy [place] [address] [facility], [address]  [place] [address]  Phone:  [phone]    Date: 02/21/2023    Patient: Gil Haskins  YOB: 1990  MRN: [id number]     Time Calculation    Start time: 0330  Stop time: 0415 Time Calculation (min): 45 minutes         Chief Complaint: Back Problem    Visit #: 36    SUBJECTIVE:  The patient reports having less pain to the shoulders and upper back area.    OBJECTIVE:  Current objective measures:       Improve strength in trunk rotation; extension     Therapeutic Exercises (CPT 51441):     1. prone on ball rollouts, 2 x30 sec; 1 x40 sec, x    2. chair lat rows, 2 x10 reps each @ 20#, x    3. overhead lifts, 2 x10 reps (blue tb), next black tb  x    4. bridges (hamstring curls position), NT    5. shoulder retractions, 1 x20 reps (orange tb), x    7. standing flies, 1 x10 reps (black tb)    10. Upright bike, seat 8  L4.5  5 minutes    11. wall lift offs  (v lift off), 1 x5 reps (green tb), xx    12. power clean, 2 x10 reps @ 75#    13. clean and jerk, 2 x10 reps @ 65#    14. trunk rotations PNF D1/D2, xx    15. superman alt UE/LE, 12x, xx    16. open the book, 5x 10 sec, x    17. PNF D1/D2 sh extension/flex, 2 x12 steps each (blue tb), xx    18. foam roller/ abs/ posterior tilts, 3', x    20. push up +plus, 3 x3 reps, x    Therapeutic Treatments and Modalities:     1. Neuromuscular Re-education (CPT 52254), scapular movements, push up plus ; needed tactile cuing and verbal descriptions of proper sequencing evelinttvirginie  Time-based treatments/modalities:    Physical Therapy Timed Treatment Charges  Manual therapy minutes (CPT 79620): 15 minutes  Therapeutic exercise minutes (CPT 42122): 15 minutes    ASSESSMENT:   Response to treatment:   STM to the cervical thoracic paraspinals; bilateral upper trap adhesions but less tightness. Patient performed Olympic lifts of Power Cleans and Clean and Jerk with  increased weight and improved motor control and strength with functional lifting patterns. (L) UE is slightly weaker in both  power lifts with less shoulder overhead extension.       PLAN/RECOMMENDATIONS:   Plan for treatment: therapy treatment to continue next visit.  Planned interventions for next visit: continue with current treatment.

## 2023-02-24 DIAGNOSIS — E79.0 HYPERURICEMIA: ICD-10-CM

## 2023-02-24 DIAGNOSIS — R12 HEART BURN: ICD-10-CM

## 2023-02-24 RX ORDER — ALLOPURINOL 200 MG/1
200 TABLET ORAL DAILY
Qty: 90 TABLET | Refills: 3 | Status: SHIPPED | OUTPATIENT
Start: 2023-02-24 | End: 2023-03-09

## 2023-02-24 RX ORDER — OMEPRAZOLE 20 MG/1
20 CAPSULE, DELAYED RELEASE ORAL DAILY
Qty: 30 CAPSULE | Refills: 3 | Status: SHIPPED | OUTPATIENT
Start: 2023-02-24 | End: 2023-04-19 | Stop reason: SDUPTHER

## 2023-02-28 NOTE — OP THERAPY DAILY TREATMENT
Outpatient Physical Therapy  DAILY TREATMENT     [facility] Outpatient Physical Therapy [place] [address] [facility], [address]  [place] [address]  Phone:  [phone]    Date: 03/01/2023    Patient: Gil Haskins  YOB: 1990  MRN: [id number]     Time Calculation    Start time: 0730  Stop time: 0815 Time Calculation (min): 45 minutes         Chief Complaint: Back Problem    Visit #: 37    SUBJECTIVE:  The patient reports that his back hurt over the weekend but its better the last day or two. He is still waiting on his new mattress in the mail.    OBJECTIVE:  Current objective measures:       Improve strength in upper to mid traps /rhomboids and lats      Therapeutic Exercises (CPT 19269):     1. prone on ball rollouts, 2 x30 sec; 1 x40 sec, x    2. chair lat rows, 2 x10 reps each @ 20#, x    3. overhead lifts, 2 x10 reps (black tb), x    4. bridges (hamstring curls position), NT    5. shoulder retractions, 1 x20 reps (orange tb), x    6. thread the needle, 3 x10 sec each, x    7. standing flies, 1 x10 reps (black tb)    8. over the chair ext    10. Upright bike, seat 8  L4.5  5 minutes    11. wall lift offs  (v lift off), 1 x5 reps (green tb), x    12. power clean, 2 x10 reps @ 75#    13. clean and jerk, 2 x10 reps @ 65#    14. trunk rotations PNF D1/D2    15. superman alt UE/LE, 12x    16. open the book, 5x 10 sec, x    17. PNF D1/D2 sh extension/flex, 2 x12 steps each (blue tb)    18. foam roller/ abs/ posterior tilts, 3'    19. frog dart over theraball, 2 x30 sec, x    20. push up +plus, 3 x3 reps    Therapeutic Treatments and Modalities:     1. Neuromuscular Re-education (CPT 42649), scapular movements, push up plus ; needed tactile cuing and verbal descriptions of proper sequencing patttern  Time-based treatments/modalities:    Physical Therapy Timed Treatment Charges  Manual therapy minutes (CPT 18501): 15 minutes  Therapeutic exercise minutes (CPT 17615): 30 minutes  ASSESSMENT:   Response to treatment:    STM to the bilateral upper traps mid traps and rhomboids; patient has increased trigger points along scapular ridges but > to the superior (L) side of the scapula. Self care instructions for improving core stabilization strength using thera-ball.  Increased resistance with overhead lifts.     PLAN/RECOMMENDATIONS:   Plan for treatment: therapy treatment to continue next visit.  Planned interventions for next visit: continue with current treatment.

## 2023-03-01 ENCOUNTER — PHYSICAL THERAPY (OUTPATIENT)
Dept: PHYSICAL THERAPY | Facility: REHABILITATION | Age: 33
End: 2023-03-01
Payer: COMMERCIAL

## 2023-03-01 DIAGNOSIS — G89.29 CHRONIC MIDLINE THORACIC BACK PAIN: ICD-10-CM

## 2023-03-01 DIAGNOSIS — M54.6 CHRONIC MIDLINE THORACIC BACK PAIN: ICD-10-CM

## 2023-03-01 PROCEDURE — 97140 MANUAL THERAPY 1/> REGIONS: CPT

## 2023-03-01 PROCEDURE — 97110 THERAPEUTIC EXERCISES: CPT

## 2023-03-06 ENCOUNTER — PHYSICAL THERAPY (OUTPATIENT)
Dept: PHYSICAL THERAPY | Facility: REHABILITATION | Age: 33
End: 2023-03-06
Payer: COMMERCIAL

## 2023-03-06 DIAGNOSIS — M54.6 CHRONIC MIDLINE THORACIC BACK PAIN: ICD-10-CM

## 2023-03-06 DIAGNOSIS — G89.29 CHRONIC MIDLINE THORACIC BACK PAIN: ICD-10-CM

## 2023-03-06 PROCEDURE — 97110 THERAPEUTIC EXERCISES: CPT

## 2023-03-06 PROCEDURE — 97140 MANUAL THERAPY 1/> REGIONS: CPT

## 2023-03-06 NOTE — OP THERAPY DAILY TREATMENT
Outpatient Physical Therapy  DAILY TREATMENT     [facility] Outpatient Physical Therapy [place]  [address], [address]  [place] [address]  Phone:  [phone]    Date: 03/06/2023    Patient: Gil Haskins  YOB: 1990  MRN: [id number]     Time Calculation    Start time: 0345  Stop time: 0430 Time Calculation (min): 45 minutes         Chief Complaint: Back Problem    Visit #: 38    SUBJECTIVE:  The patient reports having upper back pain return again but he is not sure whether it's the new mattress or working out at the gym.    OBJECTIVE:  Current objective measures:       Increase trunk stabilization strength     Therapeutic Exercises (CPT 26846):     1. prone on ball rollouts, 2 x30 sec; 1 x40 sec, x    2. chair lat rows, 2 x10 reps each @ 20#, x    3. overhead lifts, 2 x10 reps (black tb), x    4. bridges (hamstring curls position), NT    5. shoulder retractions, 1 x20 reps (orange tb), x    6. thread the needle, 3 x10 sec each, x    7. standing flies, 1 x10 reps (black tb)    8. over the chair ext, 20x, xx    9. open book stretch, 4x 20 sec    10. Upright bike, seat 8  L4.5  5 minutes    11. wall lift offs  (v lift off), 1 x5 reps (green tb), x    12. power clean, 2 x10 reps @ 75#    13. clean and jerk, 2 x10 reps @ 65#    14. trunk rotations PNF D1/D2    15. superman alt UE/LE, 12x    16. theraball hamstring curls, 20x with 7# medicine ball, xxx    17. PNF D1/D2 sh extension/flex, 2 x12 steps each (blue tb)    18. foam roller/ abs/ posterior tilts, 3'    19. frog dart over theraball, 2 x30 sec, x    20. push up +plus, 3 x3 reps    Therapeutic Treatments and Modalities:     1. Neuromuscular Re-education (CPT 61635), scapular movements, push up plus ; needed tactile cuing and verbal descriptions of proper sequencing patttern  Time-based treatments/modalities:    Physical Therapy Timed Treatment Charges  Manual therapy minutes (CPT 82114): 15 minutes  Therapeutic exercise minutes (CPT 95585): 30  minutes  ASSESSMENT:   Response to treatment:   STM to the valerie thoracic paraspinals and mid to upper traps; increased tightness to the (L) medial scapular region. Patient was unable to do some upper body exercises today due to (R) forearm possible strain/strain. Completed mid thoracic trunk stabilization exercises to fatigue response.      PLAN/RECOMMENDATIONS:   Plan for treatment: therapy treatment to continue next visit.  Planned interventions for next visit: continue with current treatment.

## 2023-03-09 ENCOUNTER — PHYSICAL THERAPY (OUTPATIENT)
Dept: PHYSICAL THERAPY | Facility: REHABILITATION | Age: 33
End: 2023-03-09
Payer: COMMERCIAL

## 2023-03-09 DIAGNOSIS — G89.29 CHRONIC MIDLINE THORACIC BACK PAIN: ICD-10-CM

## 2023-03-09 DIAGNOSIS — M54.6 CHRONIC MIDLINE THORACIC BACK PAIN: ICD-10-CM

## 2023-03-09 PROCEDURE — 97140 MANUAL THERAPY 1/> REGIONS: CPT

## 2023-03-09 PROCEDURE — 97110 THERAPEUTIC EXERCISES: CPT

## 2023-03-10 NOTE — OP THERAPY DAILY TREATMENT
Outpatient Physical Therapy  DAILY TREATMENT     [facility] Outpatient Physical Therapy [place] [address], [address]  [place] [address]  Phone:  [phone]    Date: 03/09/2023    Patient: Gil Haskins  YOB: 1990  MRN: [id number]     Time Calculation    Start time: 0415  Stop time: 0500 Time Calculation (min): 45 minutes         Chief Complaint: Back Problem    Visit #: 39    SUBJECTIVE:  The patient reports having back pain from his bed but he is not sure     OBJECTIVE:  Current objective measures:       Decrease pain to the thoracic region; improve mobility at T4-T5     Therapeutic Exercises (CPT 24183):     1. prone on ball rollouts, 2 x30 sec; 1 x40 sec, x    2. chair lat rows, 2 x10 reps each @ 20#, x    3. overhead lifts, 2 x10 reps (black tb), x    4. bridges (hamstring curls position), NT    5. shoulder retractions, 1 x20 reps (orange tb), x    6. thread the needle, 3 x10 sec each, x    7. standing flies, 1 x10 reps (black tb)    8. over the chair ext, 20x, xx    9. open book stretch, 4x 20 sec    10. Upright bike, seat 8  L4.5  5 minutes    11. wall lift offs  (v lift off), 1 x5 reps (green tb), x    12. power clean, 2 x10 reps @ 75#    13. clean and jerk, 2 x10 reps @ 65#    14. trunk rotations PNF D1/D2    15. superman alt UE/LE, 12x    16. theraball hamstring curls, 20x with 7# medicine ball, xxx    17. PNF D1/D2 sh extension/flex, 2 x12 steps each (blue tb)    18. foam roller/ abs/ posterior tilts, 3'    19. frog dart over theraball, 2 x30 sec, x    20. push up +plus, 3 x3 reps    Therapeutic Treatments and Modalities:     1. Neuromuscular Re-education (CPT 75575), scapular movements, push up plus ; needed tactile cuing and verbal descriptions of proper sequencing patttern  Time-based treatments/modalities:  Physical Therapy Timed Treatment Charges  Manual therapy minutes (CPT 90911): 15 minutes  Therapeutic exercise minutes (CPT 51901): 30 minutes    ASSESSMENT:   Response to treatment:    Manual therpy techniques applied ; PA's at grade 5 with multiple cavitations. Patiet tolerated well. Self education on proper engagement of the TrA's and glute hip muscles with correct position of lumbar spine in neutral; vc's to correct positions during posterior pelvic tilts. Patient performed bridges with increased resistance from weight plate. Tolerated       PLAN/RECOMMENDATIONS:   Plan for treatment: therapy treatment to continue next visit.  Planned interventions for next visit: continue with current treatment.

## 2023-03-13 ENCOUNTER — PHYSICAL THERAPY (OUTPATIENT)
Dept: PHYSICAL THERAPY | Facility: REHABILITATION | Age: 33
End: 2023-03-13
Payer: COMMERCIAL

## 2023-03-13 DIAGNOSIS — M54.6 CHRONIC MIDLINE THORACIC BACK PAIN: ICD-10-CM

## 2023-03-13 DIAGNOSIS — G89.29 CHRONIC MIDLINE THORACIC BACK PAIN: ICD-10-CM

## 2023-03-13 PROCEDURE — 97112 NEUROMUSCULAR REEDUCATION: CPT

## 2023-03-13 PROCEDURE — 97110 THERAPEUTIC EXERCISES: CPT

## 2023-03-13 PROCEDURE — 97140 MANUAL THERAPY 1/> REGIONS: CPT

## 2023-03-13 NOTE — OP THERAPY DAILY TREATMENT
Outpatient Physical Therapy  DAILY TREATMENT     [facility] Outpatient Physical Therapy [place] [address] [facility], [address]  [place] [address]  Phone:  [phone]    Date: 03/13/2023    Patient: Gil Haskins  YOB: 1990  MRN: [id number]     Time Calculation    Start time: 0730  Stop time: 0815 Time Calculation (min): 45 minutes         Chief Complaint: Back Problem    Visit #: 40    SUBJECTIVE:  The patient still has pain /discomfort in his (R) forearm and wrist; difficulty lifting and turning things using it.     OBJECTIVE:  Current objective measures:       Decrease connective tissue tightness to the (L) scapular ridge; superior ridge; improve mobility    Therapeutic Exercises (CPT 15918):     1. prone on ball rollouts, 2 x30 sec; 1 x40 sec, xxx    2. chair lat rows, 2 x10 reps each @ 20#, xxx    3. overhead lifts, 2 x10 reps (black tb), xxx    4. bridges (hamstring curls position), 2 x15 reps with 15# plate    5. shoulder retractions, 1 x20 reps (orange tb), xx    6. thread the needle, 3 x10 sec each    7. standing flies, 1 x10 reps (black tb)    8. over the chair ext, 20x, xx    9. open book stretch, 4x 20 sec    10. Upright bike, seat 8  L5  6 minutes    11. wall lift offs  (v lift off), 1 x5 reps (green tb), xxx    12. power clean, 2 x10 reps @ 75#, NT    13. clean and jerk, 2 x10 reps @ 65#, NT    14. trunk rotations PNF D1/D2, NT    15. superman alt UE/LE, 12x, xx    16. theraball hamstring curls, 20x with 7# medicine ball, xxx    17. PNF D1/D2 sh extension/flex, 2 x12 steps each (blue tb)    18. foam roller/ abs/ posterior tilts, 3'    19. frog dart over theraball, 2 x30 sec, x    20. push up +plus, 3 x3 reps      Therapeutic Exercise Summary: Lower abdominal modified ab curls using 7# medicine ball: 1 set of 10 reps    Therapeutic Treatments and Modalities:     1. Neuromuscular Re-education (CPT 52295), lower abdominal curls with medicine ball use    2. Manual Therapy (CPT 27655), thoracic, (L)  medial scapular ridge  Time-based treatments/modalities:    Physical Therapy Timed Treatment Charges  Manual therapy minutes (CPT 01166): 15 minutes  Neuromusc re-ed, balance, coor, post minutes (CPT 84200): 15 minutes  Therapeutic exercise minutes (CPT 32862): 15 minutes    ASSESSMENT:   Response to treatment:     STM to the (L) scapular ridge and medial scapular ridge of connective tissue. Patient reported some tightness and tenderness upon treatment. Progression with upper abdominal strengthening using 7# medicine ball. Also performed progression with lower abdominal exercises      PLAN/RECOMMENDATIONS:   Plan for treatment: therapy treatment to continue next visit.  Planned interventions for next visit: continue with current treatment.

## 2023-03-16 ENCOUNTER — PHYSICAL THERAPY (OUTPATIENT)
Dept: PHYSICAL THERAPY | Facility: REHABILITATION | Age: 33
End: 2023-03-16
Payer: COMMERCIAL

## 2023-03-16 DIAGNOSIS — G89.29 CHRONIC MIDLINE THORACIC BACK PAIN: ICD-10-CM

## 2023-03-16 DIAGNOSIS — M54.6 CHRONIC MIDLINE THORACIC BACK PAIN: ICD-10-CM

## 2023-03-16 PROCEDURE — 97110 THERAPEUTIC EXERCISES: CPT

## 2023-03-16 PROCEDURE — 97140 MANUAL THERAPY 1/> REGIONS: CPT

## 2023-03-16 NOTE — OP THERAPY DAILY TREATMENT
Outpatient Physical Therapy  DAILY TREATMENT     [facility] Outpatient Physical Therapy [place] [address] [facility], [address]  [place] [address]  Phone:  [phone]    Date: 03/16/2023    Patient: Gil Haskins  YOB: 1990  MRN: [id number]     Time Calculation    Start time: 0415  Stop time: 0500 Time Calculation (min): 45 minutes         Chief Complaint: Back Problem    Visit #: 41    SUBJECTIVE:  The patient continues to have pain radiation pain between the shoulders with increased fatigue response    OBJECTIVE:  Current objective measures:       Decrease connective tissue tightness to the UT's, rhomboids    Therapeutic Exercises (CPT 39083):     1. prone on ball rollouts, 2 x30 sec; 1 x40 sec, xxx    2. chair lat rows, 2 x10 reps each @ 20#, xxx    3. overhead lifts, 2 x10 reps (black tb), xxx    4. bridges (hamstring curls position), 2 x15 reps with 15# plate    5. shoulder retractions, 1 x20 reps (orange tb), xx    6. thread the needle, 3 x10 sec each    7. standing flies, 1 x10 reps (black tb)    8. over the chair ext, 20x, xx    9. open book stretch, 4x 20 sec    10. Upright bike, seat 8  L5  6 minutes    11. wall lift offs  (v lift off), 1 x5 reps (green tb), xxx    12. power clean, 2 x10 reps @ 75#, NT    13. clean and jerk, 2 x10 reps @ 65#, NT    14. trunk rotations PNF D1/D2, NT    15. superman alt UE/LE, 12x, xx    16. theraball hamstring curls, 20x with 7# medicine ball, xxx    17. PNF D1/D2 sh extension/flex, 2 x12 steps each (blue tb)    18. foam roller/ abs/ posterior tilts, 3'    19. frog dart over theraball, 2 x30 sec, x    20. push up +plus, 3 x3 reps      Therapeutic Exercise Summary: Lower abdominal modified ab curls using 7# medicine ball: 1 set of 10 reps    Therapeutic Treatments and Modalities:     1. Neuromuscular Re-education (CPT 19489), lower abdominal curls with medicine ball use    2. Manual Therapy (CPT 06870), thoracic, (L) medial scapular ridge  Time-based  treatments/modalities:    Physical Therapy Timed Treatment Charges  Manual therapy minutes (CPT 79864): 15 minutes  Therapeutic exercise minutes (CPT 62560): 30 minutes    ASSESSMENT:   Response to treatment:   STM to the medial scapular ridge to the (L) side of the mid to upper traps; rhomboids; tenderness to the (L) side > (R) side; Patient still has difficulty with (R) hand ; limited with UE and hand  use with resistance. Performed more trunk stabilization and thoracic strengthening exercise. Tolerated well.     PLAN/RECOMMENDATIONS:   Plan for treatment: therapy treatment to continue next visit.  Planned interventions for next visit: continue with current treatment.

## 2023-03-28 ENCOUNTER — PHYSICAL THERAPY (OUTPATIENT)
Dept: PHYSICAL THERAPY | Facility: REHABILITATION | Age: 33
End: 2023-03-28
Payer: COMMERCIAL

## 2023-03-28 DIAGNOSIS — G89.29 CHRONIC MIDLINE THORACIC BACK PAIN: ICD-10-CM

## 2023-03-28 DIAGNOSIS — M54.6 CHRONIC MIDLINE THORACIC BACK PAIN: ICD-10-CM

## 2023-03-28 PROCEDURE — 97112 NEUROMUSCULAR REEDUCATION: CPT

## 2023-03-28 PROCEDURE — 97110 THERAPEUTIC EXERCISES: CPT

## 2023-03-28 NOTE — OP THERAPY DAILY TREATMENT
Outpatient Physical Therapy  DAILY TREATMENT     [facility] Outpatient Physical Therapy [place]  [address], [address]  [place] [address]  Phone:  [phone]    Date: 03/28/2023    Patient: Gil Haskins  YOB: 1990  MRN: [id number]     Time Calculation    Start time: 0415  Stop time: 0500 Time Calculation (min): 45 minutes         Chief Complaint: Back Problem    Visit #: 42    SUBJECTIVE:  The patient has some lingering pain to his (R) wrist and upper back.    OBJECTIVE:  Current objective measures:       Improve trunk stability in lower abdominals TrA's     Therapeutic Exercises (CPT 00334):     1. prone on ball rollouts, 2 x30 sec; 1 x40 sec, xxx    2. chair lat rows, 2 x10 reps each @ 20#, xxx    3. overhead lifts, 2 x10 reps (black tb), xxx    4. bridges (hamstring curls position), 2 x15 reps with 15# plate    5. shoulder retractions, 1 x20 reps (orange tb), xx    6. thread the needle, 3 x10 sec each    7. standing flies, 1 x10 reps (black tb)    8. over the chair ext, 20x, xx    9. open book stretch, 4x 20 sec    10. Upright bike, seat 8  L5  6 minutes    11. wall lift offs  (v lift off), 1 x 5 reps (green tb), xxx    12. power clean, 2 x10 reps @ 75#, NT    13. clean and jerk, 2 x10 reps @ 65#, NT    14. trunk rotations PNF D1/D2, NT    15. superman alt UE/LE, 12x, xx    16. plank lateral with hip abduction, 1 x 5 reps, xxxxx    17. PNF D1/D2 sh extension/flex, 2 x12 steps each (blue tb)    18. foam roller/ abs/ posterior tilts, 3'    19. dead bug, xxxxx    20. push up +plus, 3 x3 reps      Therapeutic Exercise Summary: Lower abdominal modified ab curls using 7# medicine ball: 1 set of 10 reps    Therapeutic Treatments and Modalities:     1. Neuromuscular Re-education (CPT 69693), lower abdominal curls with medicine ball use    2. Manual Therapy (CPT 79390), thoracic, (L) medial scapular ridge  Time-based treatments/modalities:    Physical Therapy Timed Treatment Charges  Neuromusc re-ed,  balance, coor, post minutes (CPT 85266): 20 minutes  Therapeutic exercise minutes (CPT 12588): 25 minutes    ASSESSMENT:   Response to treatment:   STM to the cervical-thoracic paraspinals; bilateral trigger points at the medial scapular ridge (R) > (L) side tenderness. Education on further trunk stabilization exercises. Patient given HEP reference. Tolerated well but with increased.       PLAN/RECOMMENDATIONS:   Plan for treatment: therapy treatment to continue next visit.  Planned interventions for next visit: continue with current treatment.

## 2023-03-30 ENCOUNTER — PHYSICAL THERAPY (OUTPATIENT)
Dept: PHYSICAL THERAPY | Facility: REHABILITATION | Age: 33
End: 2023-03-30
Payer: COMMERCIAL

## 2023-03-30 DIAGNOSIS — G89.29 CHRONIC MIDLINE THORACIC BACK PAIN: ICD-10-CM

## 2023-03-30 DIAGNOSIS — M54.6 CHRONIC MIDLINE THORACIC BACK PAIN: ICD-10-CM

## 2023-03-30 DIAGNOSIS — M25.561 ACUTE PAIN OF RIGHT KNEE: ICD-10-CM

## 2023-03-30 PROCEDURE — 97112 NEUROMUSCULAR REEDUCATION: CPT

## 2023-03-30 PROCEDURE — 97140 MANUAL THERAPY 1/> REGIONS: CPT

## 2023-03-30 PROCEDURE — 97110 THERAPEUTIC EXERCISES: CPT

## 2023-03-30 NOTE — OP THERAPY DAILY TREATMENT
Outpatient Physical Therapy  DAILY TREATMENT     [facility] Outpatient Physical Therapy [place]  [address] [facility], [address]  [place] [address]  Phone:  [phone]    Date: 03/30/2023    Patient: Gil Haskins  YOB: 1990  MRN: [id number]     Time Calculation                   Chief Complaint: No chief complaint on file.    Visit #: 43    SUBJECTIVE:  The patient has no pain in his upper back today; (R) wrist and forearm are getting gbetter.    OBJECTIVE:  Current objective measures:       Increase trunk stabilization     Therapeutic Exercises (CPT 37043):     1. prone on ball rollouts, 2 x30 sec; 1 x40 sec, xxx    2. chair lat rows, 2 x10 reps each @ 20#, xxx    3. overhead lifts, 2 x10 reps (black tb), xxx    4. bridges (hamstring curls position), 2 x15 reps with 15# plate    5. shoulder retractions, 1 x20 reps (orange tb)    6. thread the needle, 3 x10 sec each    7. standing flies, 1 x10 reps (black tb)    8. over the chair ext, 20x, xx    9. open book stretch, 4x 20 sec    10. Upright bike, seat 8  L5  6 minutes    11. wall lift offs  (v lift off), 1 x 5 reps (green tb), xxx    12. power clean, 2 x10 reps @ 75#, NT    13. clean and jerk, 2 x10 reps @ 65#, NT    14. trunk rotations PNF D1/D2, NT    15. superman alt UE/LE, 12x, xx    16. plank lateral with hip abduction, 1 x 5 reps, xxxxx    17. PNF D1/D2 sh extension/flex, 2 x12 steps each (blue tb)    18. foam roller/ abs/ posterior tilts, 3'    19. dead bug, xxxxx    20. push up +plus, 3 x3 reps      Therapeutic Exercise Summary: Lower abdominal modified ab curls using 7# medicine ball: 1 set of 10 reps    Therapeutic Treatments and Modalities:     1. Neuromuscular Re-education (CPT 81338), lower abdominal curls with medicine ball use    2. Manual Therapy (CPT 07672), thoracic, (L) medial scapular ridge  Time-based treatments/modalities:       ASSESSMENT:   Response to treatment:   STM to the upper to mid traps; rhomboids with moderate pressure; medial  scapular ridge bilaterally trigger points mor superior to scapular region not tender but tightness. Patient performed strengthening exercises for these muscle groups without pain. Self care education on trunk stabilization with quadriped oblique knee to elbow touches with UE/LE extensions. Tolerated well to fatigue response.     PLAN/RECOMMENDATIONS:   Plan for treatment: therapy treatment to continue next visit.  Planned interventions for next visit: continue with current treatment.

## 2023-04-04 ENCOUNTER — APPOINTMENT (OUTPATIENT)
Dept: PHYSICAL THERAPY | Facility: REHABILITATION | Age: 33
End: 2023-04-04
Payer: COMMERCIAL

## 2023-04-07 ENCOUNTER — PHYSICAL THERAPY (OUTPATIENT)
Dept: PHYSICAL THERAPY | Facility: REHABILITATION | Age: 33
End: 2023-04-07
Payer: COMMERCIAL

## 2023-04-07 DIAGNOSIS — M25.561 ACUTE PAIN OF RIGHT KNEE: ICD-10-CM

## 2023-04-07 DIAGNOSIS — M54.6 CHRONIC MIDLINE THORACIC BACK PAIN: ICD-10-CM

## 2023-04-07 DIAGNOSIS — G89.29 CHRONIC MIDLINE THORACIC BACK PAIN: ICD-10-CM

## 2023-04-07 PROCEDURE — 97140 MANUAL THERAPY 1/> REGIONS: CPT

## 2023-04-07 PROCEDURE — 97112 NEUROMUSCULAR REEDUCATION: CPT

## 2023-04-07 PROCEDURE — 97110 THERAPEUTIC EXERCISES: CPT

## 2023-04-07 NOTE — OP THERAPY DAILY TREATMENT
Outpatient Physical Therapy  DAILY TREATMENT     [facility] Outpatient Physical Therapy [place]  [address], [address]  [place] [address]  Phone:  [phone]    Date: 04/07/2023    Patient: Gil Haskins  YOB: 1990  MRN: [id number]     Time Calculation    Start time: 0800  Stop time: 0845 Time Calculation (min): 45 minutes         Chief Complaint: Back Problem    Visit #: 44    SUBJECTIVE:  The patient reports doing well with his back and knees,however his (L) knee is sometimes sore posteriorly. He also continues to have pain with his (R) hand in extension and pushing over it.    OBJECTIVE:  Current objective measures:       Mobilization to the thoracic spine by 1 grade at T4-T8    Therapeutic Exercises (CPT 41157):     1. prone on ball rollouts, 2 x30 sec; 1 x40 sec, xxx    2. chair lat rows, 2 x15 reps each @ 20#, xxx    3. overhead lifts, 2 x10 reps (black tb), xxx    4. bridges (hamstring curls position), 2 x15 reps with 15# plate    5. shoulder retractions, 1 x20 reps (orange tb), xx    6. thread the needle, 3 x10 sec each, xx    7. standing flies, 1 x10 reps (black tb)    8. over the chair ext, 20x, xx    9. open book stretch, 4x 20 sec, xx    10. Upright bike, seat 8  L5  6 minutes    11. wall lift offs  (v lift off), 2 x 8 reps (green tb), xxx    12. power clean, 2 x10 reps @ 75#, NT    13. clean and jerk, 2 x10 reps @ 65#, NT    14. trunk rotations PNF D1/D2, NT    15. superman alt UE/LE, 12x, xx    16. plank lateral with hip abduction, 1 x 5 reps, xxxxx    17. PNF D1/D2 sh extension/flex, 2 x12 steps each (blue tb)    18. foam roller/ abs/ posterior tilts, 3'    19. dead bug, xxxxx    20. push up +plus, 3 x3 reps      Therapeutic Exercise Summary: Lower abdominal modified ab curls using 7# medicine ball: 1 set of 10 reps    Therapeutic Treatments and Modalities:     1. Neuromuscular Re-education (CPT 31893), lower abdominal curls with medicine ball use    2. Manual Therapy (CPT 92538),  thoracic, (L) medial scapular ridge  Time-based treatments/modalities:    Physical Therapy Timed Treatment Charges  Manual therapy minutes (CPT 18119): 15 minutes  Neuromusc re-ed, balance, coor, post minutes (CPT 43108): 15 minutes  Therapeutic exercise minutes (CPT 86986): 15 minutes    ASSESSMENT:   Response to treatment:   CPA's to T4-T8 grade 4-5 with cavitation to T4-5; patient reports decreased connective tissue pressure/ tightness between shoulders. STM to inferior scapula; trigger points but less pain following interventions.    PLAN/RECOMMENDATIONS:   Plan for treatment: therapy treatment to continue next visit.  Planned interventions for next visit: continue with current treatment.

## 2023-04-10 ENCOUNTER — TELEPHONE (OUTPATIENT)
Dept: HEALTH INFORMATION MANAGEMENT | Facility: OTHER | Age: 33
End: 2023-04-10
Payer: COMMERCIAL

## 2023-04-10 ENCOUNTER — HOSPITAL ENCOUNTER (OUTPATIENT)
Dept: LAB | Facility: MEDICAL CENTER | Age: 33
End: 2023-04-10
Payer: COMMERCIAL

## 2023-04-10 DIAGNOSIS — E79.0 HYPERURICEMIA: ICD-10-CM

## 2023-04-10 DIAGNOSIS — E78.1 HYPERTRIGLYCERIDEMIA: ICD-10-CM

## 2023-04-10 DIAGNOSIS — R73.03 PREDIABETES: ICD-10-CM

## 2023-04-10 LAB
ALBUMIN SERPL BCP-MCNC: 4.3 G/DL (ref 3.2–4.9)
ALBUMIN/GLOB SERPL: 1.3 G/DL
ALP SERPL-CCNC: 72 U/L (ref 30–99)
ALT SERPL-CCNC: 27 U/L (ref 2–50)
ANION GAP SERPL CALC-SCNC: 14 MMOL/L (ref 7–16)
AST SERPL-CCNC: 20 U/L (ref 12–45)
BILIRUB SERPL-MCNC: 0.3 MG/DL (ref 0.1–1.5)
BUN SERPL-MCNC: 12 MG/DL (ref 8–22)
CALCIUM ALBUM COR SERPL-MCNC: 9.1 MG/DL (ref 8.5–10.5)
CALCIUM SERPL-MCNC: 9.3 MG/DL (ref 8.5–10.5)
CHLORIDE SERPL-SCNC: 108 MMOL/L (ref 96–112)
CHOLEST SERPL-MCNC: 156 MG/DL (ref 100–199)
CO2 SERPL-SCNC: 22 MMOL/L (ref 20–33)
CREAT SERPL-MCNC: 1.01 MG/DL (ref 0.5–1.4)
EST. AVERAGE GLUCOSE BLD GHB EST-MCNC: 120 MG/DL
FASTING STATUS PATIENT QL REPORTED: NORMAL
GFR SERPLBLD CREATININE-BSD FMLA CKD-EPI: 101 ML/MIN/1.73 M 2
GLOBULIN SER CALC-MCNC: 3.3 G/DL (ref 1.9–3.5)
GLUCOSE SERPL-MCNC: 110 MG/DL (ref 65–99)
HBA1C MFR BLD: 5.8 % (ref 4–5.6)
HDLC SERPL-MCNC: 32 MG/DL
LDLC SERPL CALC-MCNC: 62 MG/DL
POTASSIUM SERPL-SCNC: 4.5 MMOL/L (ref 3.6–5.5)
PROT SERPL-MCNC: 7.6 G/DL (ref 6–8.2)
SODIUM SERPL-SCNC: 144 MMOL/L (ref 135–145)
TRIGL SERPL-MCNC: 312 MG/DL (ref 0–149)
URATE SERPL-MCNC: 6 MG/DL (ref 2.5–8.3)

## 2023-04-10 PROCEDURE — 80061 LIPID PANEL: CPT

## 2023-04-10 PROCEDURE — 80053 COMPREHEN METABOLIC PANEL: CPT

## 2023-04-10 PROCEDURE — 84550 ASSAY OF BLOOD/URIC ACID: CPT

## 2023-04-10 PROCEDURE — 36415 COLL VENOUS BLD VENIPUNCTURE: CPT

## 2023-04-10 PROCEDURE — 83036 HEMOGLOBIN GLYCOSYLATED A1C: CPT

## 2023-04-12 ENCOUNTER — PHYSICAL THERAPY (OUTPATIENT)
Dept: PHYSICAL THERAPY | Facility: REHABILITATION | Age: 33
End: 2023-04-12
Payer: COMMERCIAL

## 2023-04-12 ENCOUNTER — OFFICE VISIT (OUTPATIENT)
Dept: SLEEP MEDICINE | Facility: MEDICAL CENTER | Age: 33
End: 2023-04-12
Payer: COMMERCIAL

## 2023-04-12 VITALS
HEART RATE: 89 BPM | OXYGEN SATURATION: 97 % | HEIGHT: 73 IN | DIASTOLIC BLOOD PRESSURE: 60 MMHG | SYSTOLIC BLOOD PRESSURE: 112 MMHG | WEIGHT: 224 LBS | RESPIRATION RATE: 16 BRPM | BODY MASS INDEX: 29.69 KG/M2

## 2023-04-12 DIAGNOSIS — G47.8 POOR SLEEP PATTERN: ICD-10-CM

## 2023-04-12 DIAGNOSIS — Z86.79 HISTORY OF PSVT (PAROXYSMAL SUPRAVENTRICULAR TACHYCARDIA): ICD-10-CM

## 2023-04-12 DIAGNOSIS — G89.29 CHRONIC MIDLINE THORACIC BACK PAIN: ICD-10-CM

## 2023-04-12 DIAGNOSIS — G47.30 BREATHING-RELATED SLEEP DISORDER: ICD-10-CM

## 2023-04-12 DIAGNOSIS — M54.6 CHRONIC MIDLINE THORACIC BACK PAIN: ICD-10-CM

## 2023-04-12 DIAGNOSIS — R06.83 SNORING: ICD-10-CM

## 2023-04-12 PROCEDURE — 99204 OFFICE O/P NEW MOD 45 MIN: CPT | Performed by: PREVENTIVE MEDICINE

## 2023-04-12 PROCEDURE — 97112 NEUROMUSCULAR REEDUCATION: CPT

## 2023-04-12 PROCEDURE — 97110 THERAPEUTIC EXERCISES: CPT

## 2023-04-12 PROCEDURE — 99212 OFFICE O/P EST SF 10 MIN: CPT | Performed by: PREVENTIVE MEDICINE

## 2023-04-12 ASSESSMENT — FIBROSIS 4 INDEX: FIB4 SCORE: 0.43

## 2023-04-12 NOTE — OP THERAPY DAILY TREATMENT
Outpatient Physical Therapy  DAILY TREATMENT     [facility] Outpatient Physical Therapy [place] [address] [facility], [address]  [place] [address]  Phone:  [phone]    Date: 04/12/2023    Patient: Gil Haskins  YOB: 1990  MRN: [id number]     Time Calculation                   Chief Complaint: No chief complaint on file.    Visit #: 45    SUBJECTIVE:  The patient reported having some (R) knee pain over the last 2 days but he's not sure why.    OBJECTIVE:  Current objective measures:       Improve stability in trunk; improve stability to (R) knee     Therapeutic Exercises (CPT 98094):     1. prone on ball rollouts, 2 x30 sec; 1 x40 sec, xxx    2. single leg squats using chair, xxx    3. leg extensions, 1 x20 reps (blue tb), xxx    5. shoulder retractions, 1 x20 reps (orange cord), xxxxx    6. thread the needle, 3 x10 sec each, xx    7. standing flies, 1 x10 reps (black tb)    8. over the chair ext, 20x, xx    9. open book stretch, 4x 20 sec, xx    10. Upright bike, seat 8  L5  6 minutes    11. wall lift offs  (v lift off), 2 x 8 reps (green tb), xxx    12. power clean, 2 x8 reps @ 45#, 75#    13. clean and jerk, 2 x5 reps @ 45#, 65#    14. trunk rotations PNF D1/D2, xxxxx    15. superman alt UE/LE, 12x, xx    16. plank lateral with hip abduction, 1 x 5 reps, xxxxx    17. PNF D1/D2 sh extension/flex, 2 x12 steps each (blue tb)    18. foam roller/ abs/ posterior tilts, 3'    19. dead bug, with weights; looped theraband for LE's free wts for hands    20. push up +plus, xxxxx      Therapeutic Exercise Summary: Lower abdominal modified ab curls using 7# medicine ball: 1 set of 10 reps    Therapeutic Treatments and Modalities:     1. Neuromuscular Re-education (CPT 37622), lower abdominal curls with medicine ball use    2. Manual Therapy (CPT 21403), thoracic, (L) medial scapular ridge  Time-based treatments/modalities:     ASSESSMENT:   Response to treatment:   Observed patient use proper form with power clean  technique; advised patient to widen stance position for increased base of support. No pain with (R) forearm during upper body motion       PLAN/RECOMMENDATIONS:   Plan for treatment: therapy treatment to continue next visit.  Planned interventions for next visit: continue with current treatment.

## 2023-04-12 NOTE — PROGRESS NOTES
"CHIEF COMPLIANT: \"I guess I need a sleep study.\"  HISTORY OF PRESENT ILLNESS:  Gil Haskins is a 32 y.o. male kindly referred by Andie Mark M.D. and  is here for a sleep medicine consultation.     Sleep History:  Gil Haskins has never been tested for sleep apnea. Gil Haskins reports loud snoring, gasps for air, and intermittent atrial tachycardia. He experiences tachycardia each evening after eating a heavy meal. The ventricular rate can get up to 150 but only happens four times a month.   Gil Haskins goes to bed at 10pm and wakes up at 630am. It usually takes Gil Haskins approximately 15mins to fall asleep. Gil Haskins does not  feel refreshed and does not  nap during the day. Gil Haskins does smoke tobacco at least 10 sticks a day for at least 13 years. He denies cannabis. He denies alcoholic beverages and caffeinated beverages. He reports increased anxiety before going to sleep.      Gil Haskins is a       Endorses family members of his mother with a hx of snoring.     EPWORTH SCORE:    4  /24, this is    consistent with EDS      Significant comorbidities and modifying factors: see below    PAST MEDICAL HISTORY:  Past Medical History:   Diagnosis Date    COPD (chronic obstructive pulmonary disease) (HCC)     GERD (gastroesophageal reflux disease)     Hyperlipidemia     Hypertension       PROBLEM LIST:  Patient Active Problem List    Diagnosis Date Noted    Chronic midline thoracic back pain 01/18/2023    Lung nodule 09/15/2022    Prediabetes 09/15/2022    Hypertriglyceridemia 04/03/2022    Hyperuricemia 04/03/2022    Tobacco use 04/03/2022    Vitamin D insufficiency 04/03/2022    Obesity (BMI 30-39.9) 03/23/2022    Tachycardia 03/23/2022    Personal history of gout 03/23/2022     PAST SOCIAL HISTORY:  Past Surgical History:   Procedure Laterality Date    ARTHROSCOPY, KNEE       PAST FAMILY HISTORY:  History reviewed. No pertinent family history.  SOCIAL HISTORY:  Social " "History     Socioeconomic History    Marital status:      Spouse name: Not on file    Number of children: Not on file    Years of education: Not on file    Highest education level: Doctorate   Occupational History    Not on file   Tobacco Use    Smoking status: Every Day     Packs/day: 0.50     Years: 12.00     Pack years: 6.00     Types: Cigarettes     Start date: 1/1/2009    Smokeless tobacco: Never    Tobacco comments:     5 cigarettes daily   Vaping Use    Vaping Use: Never used   Substance and Sexual Activity    Alcohol use: Not Currently    Drug use: Never    Sexual activity: Not on file   Other Topics Concern    Not on file   Social History Narrative    Not on file     Social Determinants of Health     Financial Resource Strain: Not on file   Food Insecurity: Not on file   Transportation Needs: Not on file   Physical Activity: Not on file   Stress: Not on file   Social Connections: Not on file   Intimate Partner Violence: Not on file   Housing Stability: Not on file     ALLERGIES: Patient has no known allergies.  MEDICATIONS:  Current Outpatient Medications   Medication Sig Dispense Refill    allopurinol (ZYLOPRIM) 100 MG Tab Take 2 Tablets by mouth every day. 90 Tablet 3    omeprazole (PRILOSEC) 20 MG delayed-release capsule Take 1 Capsule by mouth every day. 30 Capsule 3    diclofenac sodium (VOLTAREN) 1 % Gel Apply 2 g topically 4 times a day as needed (pain). 50 g 3    propranolol (INDERAL) 10 MG Tab Take 2 Tablets by mouth every evening. 30 Tablet 3    Omega-3 Fatty Acids (FISH OIL PO) Take  by mouth.      vitamin D3 (CHOLECALCIFEROL) 1000 Unit (25 mcg) Tab Take 1,000 Units by mouth every day.      calcium carbonate (OS-ROLANDO 500) 1250 (500 Ca) MG Tab Take 500 mg by mouth every day. (Patient not taking: Reported on 4/12/2023)       No current facility-administered medications for this visit.    \"CURRENT RX\"    REVIEW OF SYSTEMS:  Constitutional: Denies weight loss, endorses chronic daytime " "fatigue  Eyes: Denies vision changes  Ears/Nose/Mouth/Throat: Denies rhinitis/nasal congestion, injury, decayed teeth/toothaches.  Cardiovascular: Denies chest pain, tightness, palpitations, swelling in legs/feet, difficulty breathing when lying down but gets better when sitting up.   Respiratory: Denies shortness of breath while awake,  Sleep: per HPI  Gastrointestinal: Denies  difficulty swallowing,  heartburn.  Genitourinary: REPORTS nocturia  Musculoskeletal: Denies painful joints, sore muscles, back pain.   Neurological: Denies frequent headaches,weakness, dizziness.    PHYSICAL EXAM/VITALS:  /60 (BP Location: Left arm, Patient Position: Sitting, BP Cuff Size: Large adult)   Pulse 89   Resp 16   Ht 1.854 m (6' 1\")   Wt 102 kg (224 lb)   SpO2 97%   BMI 29.55 kg/m²   Neck circumference (inches): 17  Appearance: Well-nourished, well-developed,  looks stated age, no acute distress  Eyes:   EOMI  ENMT: MASKED Mallampati:3    Neck: Supple, trachea midline  Respiratory effort:  No intercostal retractions or use of accessory muscles  Lung auscultation:  No wheezes rhonchi rubs or rales  Cardiac: No murmurs, rubs, or gallops; regular rhythm, normal rate; no edema  Musculoskeletal:  Grossly normal; gait and station normal  Neurologic:  oriented to person, time, place, and purpose; judgement intact  Psychiatric:  No depression, anxiety, agitation    MEDICAL DECISION MAKING:  The medical record was reviewed as it pertains to this referral. This includes records from primary care,consultants notes, referral request, hospital records, labs and imaging. Any available diagnostic and titration nocturnal polysomnograms, home sleep apnea tests, continuous nocturnal oximetry results, multiple sleep latency tests, and recent compliance reports were reviewed with the patient.    ASSESSMENT/PLAN:  Gil Haskins is a 32 y.o. male  who has a high pretest probability of having obstructive sleep apnea.         1. " Breathing-related sleep disorder  - Overnight Home Sleep Study; Future    2. Snoring  - Overnight Home Sleep Study; Future    3. Poor sleep pattern  - Overnight Home Sleep Study; Future    4. History of PSVT (paroxysmal supraventricular tachycardia)  - Overnight Home Sleep Study; Future    The patient has signs and symptoms consistent with obstructive sleep apnea hypopnea syndrome. Will schedule a nocturnal polysomnogram or a home sleep apnea test (please see plan).  The risks of untreated sleep apnea were discussed with the patient at length. Patients with ISAURA are at increased risk of cardiovascular disease including coronary artery disease, systemic arterial hypertension, pulmonary arterial hypertension, cardiac arrhythmias, and stroke. ISAURA patients have an increased risk of motor vehicle accidents, type 2 diabetes, chronic kidney disease, and non-alcoholic liver disease. The patient was advised to avoid driving a motor vehicle when drowsy.  Have advised the patient to follow up with the appropriate healthcare practitioners for all other medical problems and issues.    RETURN TO CLINIC: Return for 3 weeks after ss to discuss results -DR. Obando.    My total time spent caring for the patient on the day of the encounter was 40 minutes. This includes time spent on a thorough chart review including other physician notes, all sleep studies, as well as critical labs and pulmonary and cardiac studies.  Additionally, it includes a thorough discussion of good sleep hygiene and stimulus control, as well as  the need for consistency in terms of sleep preparation and practice.    Please note that this dictation was created using voice recognition software.  I have made every reasonable attempt to correct obvious errors, I expect that there are errors of grammar and possibly content that I did not discover before finalizing this note.                      Answers submitted by the patient for this visit:  Sleep Center  "Questionnaire (Submitted on 4/10/2023)  Occupation :   Height: 6' 1\"  Current weight: 232 lbs  6 months ago: 240 lbs  At age 21: 155 lbs  What is the reason for your visit today?: High heart rate, snoring  Name of person referring you to the Sleep Center: Andie Mark  Have you ever been hospitalized?: No  Have you ever had problems with anesthesia?: No  Have you experienced post-operative delirium?: No  Any complications with surgery?: No  What year did you receive your last Flu shot?: 2022  What year did you receive you last Pneumonia shot?: 2022  Have you had a TB skin test? If so, please list the year and result:: 2017, Negative  Please briefly describe your sleep problem and how old you were when it began.: Snoring, 25  How does this affect your daily life and activities? Please also rate how serious of a problem this is (1 = Not at all, 10 = Very Serious).: Partner's sleep is affected. Maybe causing stress and high heart rate.  Have you had any previous evaluations, examinations, or treatment for this sleep problem or any other problems with your sleep? If so, please describe the evaluation, treatment, and results.: No  Have you used any medications (prescribed or otherwise) to help your sleep problem? If yes, include name, amount, frequency, and the prescribing physician.: No  If employed, what time do you usually start and end work?: 9:00am to 5:00 pm  Do you ever change work shifts? If yes, describe how often (never, infrequently, regularly).: No  What time do you usually go to bed and wake up on: Weekdays? Weekends?: Weekdays: wakeup at 6:30am, go to bed at 10:00pm. Weekends: wakeup at 10:00 am, goto bed at 12:00 midnight  Do you have a regular bed partner?: Yes  How many minutes does it usually take to fall asleep at night after turning off the lights?: 15  What do you ordinarily do just prior to turning out the lights and attempting to go to sleep (e.g., reading, TV, baths, etc.)?: brush " my teeth  On average, how many times do you wake up during the night?: 1  On average, how many times do you wake up to use the bathroom?: 1  Do you often wake up too early in the morning and are unable to return to sleep?: no  On average, how many hours of sleep do you get per night?: 9  How do you usually awaken?  Alarm, spontaneously, or other?: spontaneously  Is it difficult for you to awaken and get out of bed after sleeping? (Not at all, Sometimes, Very): sometimes  Do you nap or return to bed after arising?: no  Are you bothered by sleepiness during the day?: no  Do you feel that you get too much sleep at night?: no  Do you feel that you get too little sleep at night?: no  Do you usually feel tired during the day? If so, what do you attribute this to?: sometimes, lack of sleep  Do you find yourself falling asleep when you don't mean to? : no  Have you ever suddenly fallen?: no  Have you ever experienced sudden body weakness?: no  Have you ever experienced weakness or paralysis upon going to sleep?: I have experienced sleep paralysis a few times  Have you ever experienced weakness or paralysis upon awakening from sleep?: no  If yes for either of the above two questions, please indicate how many times per week does this occur?: 0  Have you ever experienced seeing things or hearing voices/noises: That weren't real? On going to sleep? During the night? On awakening from sleep? During the day?: no  Do you have difficulty breathing at night? If yes, briefly describe.: Sometimes. At its worst I have to sit upright to breathe properly. Doesn't happen often.  How many times per week?: Maybe for a period of few weeks in a year  How did you become aware of this, at what age did this first occur, and how many years has this occurred?: 26  Have you been told you snore while asleep? If so, does it disturb a bed partner (or someone in the same room), or someone in the next room?: Yes I snore and yes it disturbs my partner in  "the same room.  Have you ever experienced doing something without being aware of the action? If yes, please describe.: no  Have you ever experienced upon lying in bed before sleep or on awakening from sleep: Restlessness of legs, \"nervous legs\", \"creeping crawling\" sensation of legs, or twitching of legs?: No  Have you ever been told that your arms or legs jerk or twitch while you are asleep? If yes, how many times per night does this occur?: No  Do you know, or have you ever been told that you do any of the following while sleeping: talk, walk, grit teeth, wet the bed, wake up screaming or seemingly afraid, have disturbing dreams, have unusual movements, wake up with headaches, (males) have erections? If yes to any of these, please indicate how many times per week, age started, last occurrence, treatment received.: No  Has anyone in your family been known to have any sleep problems? If yes, please list the type of problem (e.g., trouble getting to sleep, too sleepy, bed wetting, etc.), the relationship of this person to you, and the treatment received.: No    "

## 2023-04-14 ENCOUNTER — APPOINTMENT (OUTPATIENT)
Dept: PHYSICAL THERAPY | Facility: REHABILITATION | Age: 33
End: 2023-04-14
Payer: COMMERCIAL

## 2023-04-14 ENCOUNTER — PHYSICAL THERAPY (OUTPATIENT)
Dept: PHYSICAL THERAPY | Facility: REHABILITATION | Age: 33
End: 2023-04-14
Payer: COMMERCIAL

## 2023-04-14 DIAGNOSIS — M54.6 CHRONIC MIDLINE THORACIC BACK PAIN: ICD-10-CM

## 2023-04-14 DIAGNOSIS — G89.29 CHRONIC MIDLINE THORACIC BACK PAIN: ICD-10-CM

## 2023-04-14 PROCEDURE — 97110 THERAPEUTIC EXERCISES: CPT

## 2023-04-14 PROCEDURE — 97112 NEUROMUSCULAR REEDUCATION: CPT

## 2023-04-14 NOTE — OP THERAPY PROGRESS SUMMARY
Outpatient Physical Therapy  PROGRESS SUMMARY NOTE      [facility] [place] [id number] [facility], [address]  [facility] [address]  Phone:  [phone]    Date of Visit: 04/14/2023    Patient: Gil Haskins  YOB: 1990  MRN: [id number]     Referring Provider: Andie Mark M.D.  [address] [place] [address]   Referring Diagnosis Pain in right knee [M25.561]     Visit Diagnoses     ICD-10-CM   1. Chronic midline thoracic back pain  M54.6    G89.29       Rehab Potential: good  Progress Report Period:   03/01/2023 - 04/14/2023    Functional Assessment Used        Objective Findings and Assessment:   Patient progression towards goals: Patient has made overall progress with (R) knee strength in flexion and extension 5/5 without pain; AROM in knee WFL's. Patient reports sitting greater than 2-3 hours will cause pain to his (R) knee while sitting beyond that time but less frequently occurs    Objective findings and assessment details: Reassessed AROM to (R) knee flexion in prone WFL without pain; no tenderness at patellar tendon with point palpation. Strength in (R) knee 5/5 without pain. Hamstring length improved bilaterally to ~15 deg measured from 90/90 supine with SLR. Overall patient has satisfied his goals originally established. Patient reports sitting greater than 2-3 hours will cause pain to his (R) knee while sitting beyond that time but less frequently.    The patient has reached a plateau at this stage of therapy and will be discharged after one final visit. He will be advised to follow his current HEP for 2-3 months and if continued symptoms continue, he may follow up with his PCP for further POC in the future.    Goals:   Short Term Goals:   1) Indep with HEP : met  2) 25% better at sitting for prolonged periods of 30-60 minutes: met  3) Less pain squatting 25% or more in the (R) knee: met    Short term goal time span:  2-4 weeks      Long Term Goals:    1) Progression/  regression with HEP: met   2) Able to go on airplane travels sitting with less pain by 1-2 levels on VAS: met    3) Negotiates stairs going up and down: met   4) Increase strength in (R) hip flexion 4+/5 to 5/5: met  5) Decrease hamstring tightness by 5-10 deg in supine 90/90 SLR position bilaterally: met  6) Sitting/ driving > 2-3 hours patient will have less pain 50-75% of the time and /or 1-2 levels less pain on VAS: not met  Long term goal time span:  4-6 weeks    Plan:   Planned therapy interventions:  Neuromuscular Re-education (CPT 04280), Therapeutic Activities (CPT 27010), Therapeutic Exercise (CPT 77963) and Self Care ADL Training (CPT 45800)  Frequency:  2x week  Duration in weeks:  6  Duration in visits:  12    Referring provider co-signature:  I have reviewed this plan of care and my co-signature certifies the need for services.     Certification Period: 04/14/2023 to 05/26/23    Physician Signature: ________________________________ Date: ______________

## 2023-04-14 NOTE — OP THERAPY DAILY TREATMENT
Outpatient Physical Therapy  DAILY TREATMENT     [facility] Outpatient Physical Therapy [place] [address], [address]  [place] [address]  Phone:  [phone]    Date: 04/14/2023    Patient: Gil Haskins  YOB: 1990  MRN: [id number]     Time Calculation    Start time: 0800  Stop time: 0845 Time Calculation (min): 45 minutes         Chief Complaint: Knee Problem and Knee Injury    Visit #: 46    SUBJECTIVE:  The patient reports being able to drive more than 60 minutes sitting without pain;  less pain greater than 50 % of the time while squatting; still has pain at 90 deg while performing wall sits holding position; no pain sitting in a plane < than 1 hour; no pain going up and down the stairs; sitting greater than 2-3 hours increases his (R) knee     OBJECTIVE:  Current objective measures:      Re-assessment of (R) knee     Therapeutic Exercises (CPT 01926):     1. prone on ball rollouts, 2 x30 sec; 1 x40 sec, xxx    2. single leg squats using chair, xxx    3. leg extensions, 1 x20 reps (blue tb), xxx    5. shoulder retractions, 1 x20 reps (orange cord), xxxxx    6. thread the needle, 3 x10 sec each, xx    7. standing flies, 1 x10 reps (black tb)    8. over the chair ext, 20x, xx    9. open book stretch, 4x 20 sec, xx    10. Upright bike, seat 8  L5  6 minutes    11. wall lift offs  (v lift off), 2 x 8 reps (green tb), xxx    12. power clean, 2 x8 reps @ 45#, 75#    13. clean and jerk, 2 x5 reps @ 45#, 65#    14. trunk rotations PNF D1/D2, xxxxx    15. superman alt UE/LE, 12x, xx    16. plank lateral with hip abduction, 1 x 5 reps, xxxxx    17. PNF D1/D2 sh extension/flex, 2 x12 steps each (blue tb)    18. foam roller/ abs/ posterior tilts, 3'    19. dead bug, with weights; looped theraband for LE's free wts for hands    20. push up +plus, xxxxx      Therapeutic Exercise Summary: Lower abdominal modified ab curls using 7# medicine ball: 1 set of 10 reps    Therapeutic Treatments and Modalities:     1.  Neuromuscular Re-education (CPT 09788), lower abdominal curls with medicine ball use    2. Manual Therapy (CPT 49912), thoracic, (L) medial scapular ridge  Time-based treatments/modalities:    Physical Therapy Timed Treatment Charges  Neuromusc re-ed, balance, coor, post minutes (CPT 42267): 15 minutes  Therapeutic exercise minutes (CPT 39278): 30 minutes    ASSESSMENT:   Response to treatment:   Reassessed AROM to (R) knee flexion in prone WFL without pain; no tenderness at patellar tendon with point palpation. Strength in (R) knee 5/5 without pain. Hamstring length improved bilaterally to ~15 deg measured from 90/90 supine with SLR. Overall patient has satisfied his goals originally established. Patient reports sitting greater than 2-3 hours will cause pain to his (R) knee while sitting beyond that time but less frequently      PLAN/RECOMMENDATIONS:   Plan for treatment: therapy treatment to continue next visit.  Planned interventions for next visit: continue with current treatment.

## 2023-04-17 ENCOUNTER — OFFICE VISIT (OUTPATIENT)
Dept: INTERNAL MEDICINE | Facility: OTHER | Age: 33
End: 2023-04-17
Payer: COMMERCIAL

## 2023-04-17 VITALS
DIASTOLIC BLOOD PRESSURE: 80 MMHG | WEIGHT: 227 LBS | TEMPERATURE: 96.8 F | OXYGEN SATURATION: 95 % | HEART RATE: 91 BPM | HEIGHT: 73 IN | SYSTOLIC BLOOD PRESSURE: 114 MMHG | RESPIRATION RATE: 15 BRPM | BODY MASS INDEX: 30.09 KG/M2

## 2023-04-17 DIAGNOSIS — Z87.39 PERSONAL HISTORY OF GOUT: ICD-10-CM

## 2023-04-17 DIAGNOSIS — E78.1 HYPERTRIGLYCERIDEMIA: ICD-10-CM

## 2023-04-17 DIAGNOSIS — Z72.0 TOBACCO USE: Primary | ICD-10-CM

## 2023-04-17 PROCEDURE — 99214 OFFICE O/P EST MOD 30 MIN: CPT | Mod: GC

## 2023-04-17 RX ORDER — FENOFIBRATE 145 MG/1
145 TABLET, COATED ORAL DAILY
Qty: 90 TABLET | Refills: 1 | Status: SHIPPED | OUTPATIENT
Start: 2023-04-17 | End: 2023-10-10

## 2023-04-17 RX ORDER — NICOTINE 21 MG/24HR
1 PATCH, TRANSDERMAL 24 HOURS TRANSDERMAL EVERY 24 HOURS
Qty: 30 PATCH | Refills: 3 | Status: SHIPPED | OUTPATIENT
Start: 2023-04-17 | End: 2023-08-03

## 2023-04-17 RX ORDER — NICOTINE 21 MG/24HR
1 PATCH, TRANSDERMAL 24 HOURS TRANSDERMAL EVERY 24 HOURS
COMMUNITY
Start: 2023-02-05 | End: 2023-04-17 | Stop reason: SDUPTHER

## 2023-04-17 RX ORDER — ONDANSETRON 4 MG/1
TABLET, FILM COATED ORAL
COMMUNITY
Start: 2023-03-23 | End: 2023-05-24

## 2023-04-17 ASSESSMENT — ENCOUNTER SYMPTOMS
COUGH: 0
CHILLS: 0
ABDOMINAL PAIN: 0
SORE THROAT: 0
DEPRESSION: 0
DIARRHEA: 0
FEVER: 0
VOMITING: 0

## 2023-04-17 ASSESSMENT — FIBROSIS 4 INDEX: FIB4 SCORE: 0.43

## 2023-04-17 NOTE — PATIENT INSTRUCTIONS
Thank you for visiting me today at the [facility].   Please follow up with your doctor in 6 months.

## 2023-04-17 NOTE — PROGRESS NOTES
Chief Complaint   Patient presents with    Follow-Up     Labs        HISTORY OF PRESENT ILLNESS: Patient is a 32 y.o. male established patient who presents today for the following.    Patient is a 32-year-old male with past medical history of hypertriglyceridemia, gout, tachycardia presents today for follow-up on labs.  Patient does state that he has been attempting to eat healthier recently and cut out soda.  Patient does state that he has been attempting to go to the gym.  Patient states that he is going to sleep medicine to see if he has sleep apnea.  Patient also states he has been attempting to improve his diet and cut down smoking.  Patient denies any other complaints at this time.    Past Medical History:   Diagnosis Date    COPD (chronic obstructive pulmonary disease) (HCC)     GERD (gastroesophageal reflux disease)     Hyperlipidemia     Hypertension        Patient Active Problem List    Diagnosis Date Noted    Chronic midline thoracic back pain 01/18/2023    Lung nodule 09/15/2022    Prediabetes 09/15/2022    Hypertriglyceridemia 04/03/2022    Hyperuricemia 04/03/2022    Tobacco use 04/03/2022    Vitamin D insufficiency 04/03/2022    Obesity (BMI 30-39.9) 03/23/2022    Tachycardia 03/23/2022    Personal history of gout 03/23/2022       Allergies: Patient has no known allergies.    Current Outpatient Medications   Medication Sig Dispense Refill    ondansetron (ZOFRAN) 4 MG Tab tablet       nicotine (NICODERM) 14 MG/24HR PATCH 24 HR Place 1 Patch on the skin every 24 hours for 28 days. 30 Patch 3    fenofibrate (TRICOR) 145 MG Tab Take 1 Tablet by mouth every day for 90 days. 90 Tablet 1    allopurinol (ZYLOPRIM) 100 MG Tab Take 2 Tablets by mouth every day. 90 Tablet 3    omeprazole (PRILOSEC) 20 MG delayed-release capsule Take 1 Capsule by mouth every day. 30 Capsule 3    diclofenac sodium (VOLTAREN) 1 % Gel Apply 2 g topically 4 times a day as needed (pain). 50 g 3    propranolol (INDERAL) 10 MG Tab Take  "2 Tablets by mouth every evening. 30 Tablet 3    Omega-3 Fatty Acids (FISH OIL PO) Take  by mouth.      vitamin D3 (CHOLECALCIFEROL) 1000 Unit (25 mcg) Tab Take 1,000 Units by mouth every day.      calcium carbonate (OS-ROLANDO 500) 1250 (500 Ca) MG Tab Take 500 mg by mouth every day. (Patient not taking: Reported on 4/12/2023)       No current facility-administered medications for this visit.       Social History     Tobacco Use    Smoking status: Every Day     Packs/day: 0.50     Years: 12.00     Pack years: 6.00     Types: Cigarettes     Start date: 1/1/2009    Smokeless tobacco: Never    Tobacco comments:     5 cigarettes daily   Vaping Use    Vaping Use: Never used   Substance Use Topics    Alcohol use: Not Currently    Drug use: Never       No family history on file.      Review of Systems   Review of Systems   Constitutional:  Negative for chills and fever.   HENT:  Negative for sore throat.    Respiratory:  Negative for cough.    Cardiovascular:  Negative for chest pain.   Gastrointestinal:  Negative for abdominal pain, diarrhea and vomiting.   Genitourinary:  Negative for dysuria.   Psychiatric/Behavioral:  Negative for depression.      Exam:  /80   Pulse 91   Temp 36 °C (96.8 °F)   Resp 15   Ht 1.854 m (6' 1\")   Wt 103 kg (227 lb)   SpO2 95%  Body mass index is 29.95 kg/m².    Constitutional:  Not in acute distress, well appearing.  HEENT:   NC/AT  Cardiovascular: Regular rate and rhythm.   Lungs:    No respiratory distress.  Abdomen: Not distended, soft. No guarding or rigidity.  Extremities:  No cyanosis/clubbing/edema. No obvious deformities.  Skin:  Warm and dry.  No visible rashes.  Neurologic: Alert & oriented x 3, CN II-XII grossly intact, strength and sensation grossly intact.  No focal deficits noted.  Psychiatric:  Affect normal, mood normal, judgment normal.    Assessment/Plan:     1. Tobacco use  Patient states he currently smokes 3 to 4 cigarettes a day   - nicotine (NICODERM) 14 " MG/24HR PATCH 24 HR; Place 1 Patch on the skin every 24 hours for 28 days.  Dispense: 30 Patch; Refill: 3    2. Hypertriglyceridemia  Triglyceride level 312, LDL of 62.  Patient states he previously tried visiting a dietitian and modify his diet however does state he is interested in lowering levels with medical therapy. Repeat Lipid panel at next visit.  -Advised about importance of diet and exercise  - fenofibrate (TRICOR) 145 MG Tab; Take 1 Tablet by mouth every day for 90 days.  Dispense: 90 Tablet; Refill: 1    3. Personal history of gout  Patient is currently on allopurinol states he has not had any recent flares      All imaging results and lab results and consult notes are reviewed at this visit.  Followup: Return in about 6 months (around 10/17/2023).    Please note that this dictation was created using voice recognition software. I have made every reasonable attempt to correct obvious errors, but I expect that there are errors of grammar and possibly content that I did not discover before finalizing the note.    Joel Peck, DO  PGY-1  Internal Medicine

## 2023-04-19 DIAGNOSIS — E79.0 HYPERURICEMIA: ICD-10-CM

## 2023-04-19 DIAGNOSIS — R12 HEART BURN: ICD-10-CM

## 2023-04-19 DIAGNOSIS — M54.6 THORACIC BACK PAIN, UNSPECIFIED BACK PAIN LATERALITY, UNSPECIFIED CHRONICITY: ICD-10-CM

## 2023-04-19 DIAGNOSIS — Z87.39 PERSONAL HISTORY OF GOUT: ICD-10-CM

## 2023-04-19 DIAGNOSIS — R00.0 TACHYCARDIA: ICD-10-CM

## 2023-04-19 RX ORDER — OMEPRAZOLE 20 MG/1
20 CAPSULE, DELAYED RELEASE ORAL DAILY
Qty: 30 CAPSULE | Refills: 3 | Status: SHIPPED | OUTPATIENT
Start: 2023-04-19 | End: 2024-02-06

## 2023-04-19 RX ORDER — ALLOPURINOL 100 MG/1
200 TABLET ORAL DAILY
Qty: 90 TABLET | Refills: 3 | Status: SHIPPED | OUTPATIENT
Start: 2023-04-19 | End: 2024-02-21

## 2023-04-19 RX ORDER — PROPRANOLOL HYDROCHLORIDE 10 MG/1
20 TABLET ORAL EVERY EVENING
Qty: 30 TABLET | Refills: 3 | Status: SHIPPED | OUTPATIENT
Start: 2023-04-19 | End: 2023-08-18

## 2023-04-20 NOTE — TELEPHONE ENCOUNTER
[facility] [employer] called wanting to see if we can change the prescription of Voltaren to the 100g since they dont have the smaller ones in stock. They will be arranging shipment for the pt

## 2023-04-26 ENCOUNTER — HOME STUDY (OUTPATIENT)
Dept: SLEEP MEDICINE | Facility: MEDICAL CENTER | Age: 33
End: 2023-04-26
Attending: PREVENTIVE MEDICINE
Payer: COMMERCIAL

## 2023-04-26 DIAGNOSIS — R06.83 SNORING: ICD-10-CM

## 2023-04-26 DIAGNOSIS — Z86.79 HISTORY OF PSVT (PAROXYSMAL SUPRAVENTRICULAR TACHYCARDIA): ICD-10-CM

## 2023-04-26 DIAGNOSIS — G47.8 POOR SLEEP PATTERN: ICD-10-CM

## 2023-04-26 DIAGNOSIS — G47.30 BREATHING-RELATED SLEEP DISORDER: ICD-10-CM

## 2023-04-26 PROCEDURE — 95800 SLP STDY UNATTENDED: CPT | Performed by: PREVENTIVE MEDICINE

## 2023-05-03 ENCOUNTER — APPOINTMENT (OUTPATIENT)
Dept: PHYSICAL THERAPY | Facility: REHABILITATION | Age: 33
End: 2023-05-03
Payer: COMMERCIAL

## 2023-05-05 ENCOUNTER — APPOINTMENT (OUTPATIENT)
Dept: PHYSICAL THERAPY | Facility: REHABILITATION | Age: 33
End: 2023-05-05
Payer: COMMERCIAL

## 2023-05-08 NOTE — PROCEDURES
DIAGNOSTIC HOME SLEEP TEST (HST) REPORT WatchPAT      PATIENT ID:  NAME:  Gil Haskins  MRN:               [id number]  YOB: 1990  DATE OF STUDY:04/26/2023       Impression:     This study shows evidence of:      1. Mild obstructive sleep apnea with PAT apnea hyponea index(pAHI) of 10.2 per hour.  PAT respiratory disturbance index (pRDI) was 16.6 per hour. These findings are based on 7 channels recording of PAT signal with sleep staging, heart rate, pulse oximetry, actigraphy, body position, snoring and respiratory movement.     2. Oxygenation O2 Sat. mean O2 sat was 93%,  river was 88%,  and maximum O2 at 97 %. O2 sat was at or  below 88% for 0 min of evaluation time. Oxygen Desaturation (>=4%) Index was elevated at 3.8/hr. AVG HR was 74 BPM.      TECHNICAL DESCRIPTION: Patient underwent home sleep apnea testing with peripheral arterial tone signal (WatchPAT™). This is a Type IV portable monitor and device. Monitoring was done with 7 channels recording of PAT signal with sleep staging, heart rate, pulse oximetry, actigraphy, body position, snoring and respiratory movement. Prior to using the device, the patient received verbal and written instructions for its application and was provided with the help desk phone number for additional telephonic instruction with 24-hour availability of qualified personnel to answer questions.    General sleep summary: . Total recording time is 7 hours and 8 minutes and approximate sleep time is 6 hours and 21 minutes. The patient spent 150.0 minutes in the supine position and 231.3 minutes in the nonsupine position.    Recommendations:  PAP    Vs alternative treatment options. These options include upper airway surgery, the use of a dental orthotic or weight loss and positional therapy. Clinical correlation is required. In general, patients with  untreated sleep apnea, are advised to avoid alcohol and sedatives and to not operate a motor vehicle while  drowsy.       In general patients with sleep apnea are advised to avoid alcohol and sedatives and to not operate a motor vehicle while drowsy. In some cases alternative treatment options may prove effective in resolving sleep apnea in these options include upper airway surgery, the use of a dental orthotic or weight loss and positional therapy. Clinical correlation is required.

## 2023-05-09 ENCOUNTER — APPOINTMENT (OUTPATIENT)
Dept: PHYSICAL THERAPY | Facility: REHABILITATION | Age: 33
End: 2023-05-09
Payer: COMMERCIAL

## 2023-05-11 ENCOUNTER — APPOINTMENT (OUTPATIENT)
Dept: PHYSICAL THERAPY | Facility: REHABILITATION | Age: 33
End: 2023-05-11
Payer: COMMERCIAL

## 2023-05-16 ENCOUNTER — APPOINTMENT (OUTPATIENT)
Dept: PHYSICAL THERAPY | Facility: REHABILITATION | Age: 33
End: 2023-05-16
Payer: COMMERCIAL

## 2023-05-18 ENCOUNTER — APPOINTMENT (OUTPATIENT)
Dept: PHYSICAL THERAPY | Facility: REHABILITATION | Age: 33
End: 2023-05-18
Payer: COMMERCIAL

## 2023-05-24 ENCOUNTER — OFFICE VISIT (OUTPATIENT)
Dept: INTERNAL MEDICINE | Facility: OTHER | Age: 33
End: 2023-05-24
Payer: COMMERCIAL

## 2023-05-24 ENCOUNTER — APPOINTMENT (OUTPATIENT)
Dept: PHYSICAL THERAPY | Facility: REHABILITATION | Age: 33
End: 2023-05-24
Payer: COMMERCIAL

## 2023-05-24 VITALS
HEIGHT: 73 IN | HEART RATE: 88 BPM | WEIGHT: 230.8 LBS | DIASTOLIC BLOOD PRESSURE: 68 MMHG | OXYGEN SATURATION: 99 % | SYSTOLIC BLOOD PRESSURE: 117 MMHG | BODY MASS INDEX: 30.59 KG/M2 | TEMPERATURE: 97.1 F

## 2023-05-24 DIAGNOSIS — E78.1 HYPERTRIGLYCERIDEMIA: ICD-10-CM

## 2023-05-24 DIAGNOSIS — M54.6 CHRONIC MIDLINE THORACIC BACK PAIN: ICD-10-CM

## 2023-05-24 DIAGNOSIS — K21.9 GASTROESOPHAGEAL REFLUX DISEASE, UNSPECIFIED WHETHER ESOPHAGITIS PRESENT: ICD-10-CM

## 2023-05-24 DIAGNOSIS — G89.29 CHRONIC MIDLINE THORACIC BACK PAIN: ICD-10-CM

## 2023-05-24 PROCEDURE — 99214 OFFICE O/P EST MOD 30 MIN: CPT | Mod: GC

## 2023-05-24 PROCEDURE — 3078F DIAST BP <80 MM HG: CPT

## 2023-05-24 PROCEDURE — 3074F SYST BP LT 130 MM HG: CPT

## 2023-05-24 ASSESSMENT — FIBROSIS 4 INDEX: FIB4 SCORE: 0.44

## 2023-05-24 NOTE — PATIENT INSTRUCTIONS
Is good to see you today Gil!    Today we discussed the return of your thoracic back pain.  Encourage you to resume your home physical therapy exercises as this seemed to have helped your symptoms prior.  If things are getting worse over the next couple weeks let me know and we will pursue further work-up.    Regarding your acid reflux, continue the omeprazole 40 mg for 2 more weeks.  Then stop taking the medication and get the urea breath test done at the lab of your choice 2 weeks after stopping the omeprazole.    We will plan to follow-up the first week of July to make sure things are improving and we will consider a referral to a GI doctor for possible upper endoscopy scope at that time.    We will see you the first week of July, sooner if needed.

## 2023-05-24 NOTE — PROGRESS NOTES
Established Patient    Patient Care Team:  Andie Mark M.D. as PCP - General (Internal Medicine)  Keaton Hudson, PT as Physical Therapist (Physical Therapy)    Gil Haskins is a 33 y.o. male who presents today with the following Chief Complaint(s): Follow up for Diagnoses of Chronic midline thoracic back pain, Gastroesophageal reflux disease, unspecified whether esophagitis present, and Hypertriglyceridemia were pertinent to this visit.    HPI:  Problem   Chronic Midline Thoracic Back Pain    Started ~11/2022  Non radiating   No inciting injury  Initially was triggered by sleeping posture when sleeping on floor- now has mattress.  No weakness, no numbness, tinlging.   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   Never any imaging.  Will come in waves, worse in the mornings. No stiffness. Stretching in the morning will make better.        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 Has been on chronic omeprazole 20 mg for many years, though reports recently increased to 40mg for worsening heart burn symptoms.   Is unsure if ever been tested for H. Pylori.   Denies late night eating.  Denies n/v.            ROS:     Denies any new chest pain or shortness of breath.  No changes to urinary or bowel function.  See HPI.    Past Medical History:   Diagnosis Date    COPD (chronic obstructive pulmonary disease) (HCC)     GERD (gastroesophageal reflux disease)     Hyperlipidemia     Hypertension      Social History     Tobacco Use    Smoking status: Every Day     Packs/day: 0.50     Years: 12.00     Pack years: 6.00      "Types: Cigarettes     Start date: 1/1/2009    Smokeless tobacco: Never    Tobacco comments:     5 cigarettes daily   Vaping Use    Vaping Use: Never used   Substance Use Topics    Alcohol use: Not Currently    Drug use: Never     Current Outpatient Medications   Medication Sig Dispense Refill    propranolol (INDERAL) 10 MG Tab Take 2 Tablets by mouth every evening. 30 Tablet 3    diclofenac sodium (VOLTAREN) 1 % Gel Apply 2 g topically 4 times a day as needed (pain). 50 g 3    omeprazole (PRILOSEC) 20 MG delayed-release capsule Take 1 Capsule by mouth every day. 30 Capsule 3    allopurinol (ZYLOPRIM) 100 MG Tab Take 2 Tablets by mouth every day. 90 Tablet 3    fenofibrate (TRICOR) 145 MG Tab Take 1 Tablet by mouth every day for 90 days. 90 Tablet 1    Omega-3 Fatty Acids (FISH OIL PO) Take  by mouth.      vitamin D3 (CHOLECALCIFEROL) 1000 Unit (25 mcg) Tab Take 1,000 Units by mouth every day.       No current facility-administered medications for this visit.     Physical Exam:  /68 (BP Location: Left arm, Patient Position: Sitting, BP Cuff Size: Adult)   Pulse 88   Temp 36.2 °C (97.1 °F) (Temporal)   Ht 1.854 m (6' 1\")   Wt 105 kg (230 lb 12.8 oz)   SpO2 99%   BMI 30.45 kg/m²   General: Well developed, well nourished male, in no distress.  Eyes: Conjuntiva without any obvious injection or erythema.   Cardiovascular: Heart is regular with out murmur  Lungs: Clear to auscultation bilaterally. No wheezes, rhonci or crackles heard. Respiratory effort is normal.  Abd: Soft, non-tender  Ext: No edema  MSK: paravertebral tenderness noted in thoracic spine. No point tenderness on spine and no radiating symptoms.     Assessment and Plan:   1. Chronic midline thoracic back pain  No alarm s/s in clinic  Likely related to postural and sleep  Resume home PT exercises    2. Gastroesophageal reflux disease, unspecified whether esophagitis present  Continue omeprazole 40mg for 2 more weeks, then stop  Test for H.pylori " 2 weeks after stopping medication    - H. PYLORI BREATH TEST    3. Hypertriglyceridemia  Continue fenofibrate  Repeat labs in September 2023.     Return in about 6 weeks (around 7/3/2023) for GERD.    Patient Instructions   Is good to see you today Gil!    Today we discussed the return of your thoracic back pain.  Encourage you to resume your home physical therapy exercises as this seemed to have helped your symptoms prior.  If things are getting worse over the next couple weeks let me know and we will pursue further work-up.    Regarding your acid reflux, continue the omeprazole 40 mg for 2 more weeks.  Then stop taking the medication and get the urea breath test done at the lab of your choice 2 weeks after stopping the omeprazole.    We will plan to follow-up the first week of July to make sure things are improving and we will consider a referral to a GI doctor for possible upper endoscopy scope at that time.    We will see you the first week of July, sooner if needed.      Andie Mark M.D. PGY2  [facility]

## 2023-05-26 ENCOUNTER — APPOINTMENT (OUTPATIENT)
Dept: PHYSICAL THERAPY | Facility: REHABILITATION | Age: 33
End: 2023-05-26
Payer: COMMERCIAL

## 2023-05-30 ENCOUNTER — APPOINTMENT (OUTPATIENT)
Dept: PHYSICAL THERAPY | Facility: REHABILITATION | Age: 33
End: 2023-05-30
Payer: COMMERCIAL

## 2023-07-31 DIAGNOSIS — Z72.0 TOBACCO USE: ICD-10-CM

## 2023-08-03 RX ORDER — NICOTINE 21 MG/24HR
PATCH, TRANSDERMAL 24 HOURS TRANSDERMAL
Qty: 28 PATCH | Refills: 2 | Status: SHIPPED | OUTPATIENT
Start: 2023-08-03 | End: 2023-10-22

## 2023-08-08 ENCOUNTER — OFFICE VISIT (OUTPATIENT)
Dept: SLEEP MEDICINE | Facility: MEDICAL CENTER | Age: 33
End: 2023-08-08
Attending: PREVENTIVE MEDICINE
Payer: COMMERCIAL

## 2023-08-08 VITALS
SYSTOLIC BLOOD PRESSURE: 110 MMHG | HEART RATE: 97 BPM | WEIGHT: 222.4 LBS | HEIGHT: 73 IN | DIASTOLIC BLOOD PRESSURE: 70 MMHG | RESPIRATION RATE: 16 BRPM | OXYGEN SATURATION: 93 % | BODY MASS INDEX: 29.48 KG/M2

## 2023-08-08 DIAGNOSIS — G47.39 POSITIONAL SLEEP APNEA: ICD-10-CM

## 2023-08-08 DIAGNOSIS — G47.33 OSA (OBSTRUCTIVE SLEEP APNEA): ICD-10-CM

## 2023-08-08 PROCEDURE — 99212 OFFICE O/P EST SF 10 MIN: CPT | Performed by: PREVENTIVE MEDICINE

## 2023-08-08 PROCEDURE — 3074F SYST BP LT 130 MM HG: CPT | Performed by: PREVENTIVE MEDICINE

## 2023-08-08 PROCEDURE — 99214 OFFICE O/P EST MOD 30 MIN: CPT | Performed by: PREVENTIVE MEDICINE

## 2023-08-08 PROCEDURE — 3078F DIAST BP <80 MM HG: CPT | Performed by: PREVENTIVE MEDICINE

## 2023-08-08 ASSESSMENT — FIBROSIS 4 INDEX: FIB4 SCORE: 0.48

## 2023-08-08 NOTE — PROGRESS NOTES
"CHIEF COMPLIANT: \"How did I do on my sleep study?\"   LAST SEEN:  Dr. Obando on 4/12/23  HISTORY OF PRESENT ILLNESS:  Gil Haskins is a 33 y.o.male who is here for sleep study results.     Sleep study results:   TYPE:HST  DATE:4/27/23  Diagnostic:AHI:10.2, RDI  16.6  Diagnostic  Oxygen Shai: 88% with 0.0 mins at or under 88%     Sleep History:  Gil Haskins reports loud snoring, gasps for air, and intermittent atrial tachycardia. He experiences tachycardia each evening after eating a heavy meal. The ventricular rate can get up to 150 but only happens four times a month. Gil Haskins goes to bed at 10pm and wakes up at 630am. It usually takes Gil Haskins approximately 15mins to fall asleep. Gil Haskins does not  feel refreshed and does not  nap during the day. Gil Haskins does smoke tobacco at least 10 sticks a day and has done so for the past.13 years. He denies cannabis. He denies alcoholic beverages and caffeinated beverages. He reports increased anxiety before going to sleep.  Significant comorbidities and modifying factors: see below     PAST MEDICAL HISTORY:  Past Medical History:   Diagnosis Date    COPD (chronic obstructive pulmonary disease) (HCC)     GERD (gastroesophageal reflux disease)     Hyperlipidemia     Hypertension       PROBLEM LIST:  Patient Active Problem List    Diagnosis Date Noted    GERD (gastroesophageal reflux disease)     Chronic midline thoracic back pain 01/18/2023    Lung nodule 09/15/2022    Prediabetes 09/15/2022    Hypertriglyceridemia 04/03/2022    Hyperuricemia 04/03/2022    Tobacco use 04/03/2022    Vitamin D insufficiency 04/03/2022    Obesity (BMI 30-39.9) 03/23/2022    Tachycardia 03/23/2022    Personal history of gout 03/23/2022     PAST SOCIAL HISTORY:  Past Surgical History:   Procedure Laterality Date    ARTHROSCOPY, KNEE       PAST FAMILY HISTORY:  History reviewed. No pertinent family history.  SOCIAL HISTORY:  Social History     Socioeconomic History    " Marital status:      Spouse name: Not on file    Number of children: Not on file    Years of education: Not on file    Highest education level: Doctorate   Occupational History    Not on file   Tobacco Use    Smoking status: Every Day     Packs/day: 0.50     Years: 12.00     Pack years: 6.00     Types: Cigarettes     Start date: 1/1/2009    Smokeless tobacco: Never    Tobacco comments:     5 cigarettes daily and 14mg nicotine patch   Vaping Use    Vaping Use: Never used   Substance and Sexual Activity    Alcohol use: Not Currently    Drug use: Never    Sexual activity: Not on file   Other Topics Concern    Not on file   Social History Narrative    Not on file     Social Determinants of Health     Financial Resource Strain: Low Risk  (3/22/2022)    Overall Financial Resource Strain (CARDIA)     Difficulty of Paying Living Expenses: Not hard at all   Food Insecurity: No Food Insecurity (3/22/2022)    Hunger Vital Sign     Worried About Running Out of Food in the Last Year: Never true     Ran Out of Food in the Last Year: Never true   Transportation Needs: No Transportation Needs (3/22/2022)    PRAPARE - Transportation     Lack of Transportation (Medical): No     Lack of Transportation (Non-Medical): No   Physical Activity: Inactive (3/22/2022)    Exercise Vital Sign     Days of Exercise per Week: 0 days     Minutes of Exercise per Session: 0 min   Stress: No Stress Concern Present (3/22/2022)    [employer] [place] [employer] - Occupational Stress Questionnaire     Feeling of Stress : Not at all   Social Connections: Moderately Isolated (3/22/2022)    Social Connection and Isolation Panel [NHANES]     Frequency of Communication with Friends and Family: Twice a week     Frequency of Social Gatherings with Friends and Family: Three times a week     Attends Jain Services: Never     Active Member of Clubs or Organizations: No     Attends Club or Organization Meetings: Never     Marital Status:  "   Intimate Partner Violence: Not on file   Housing Stability: Low Risk  (3/22/2022)    Housing Stability Vital Sign     Unable to Pay for Housing in the Last Year: No     Number of Places Lived in the Last Year: 2     Unstable Housing in the Last Year: No     ALLERGIES: Patient has no known allergies.  MEDICATIONS:  Current Outpatient Medications   Medication Sig Dispense Refill    FENOFIBRATE PO Take  by mouth.      nicotine (NICODERM) 14 MG/24HR PATCH 24 HR Apply 1 patch topically every 24 hours for 28 days. 28 Patch 2    propranolol (INDERAL) 10 MG Tab Take 2 Tablets by mouth every evening. 30 Tablet 3    diclofenac sodium (VOLTAREN) 1 % Gel Apply 2 g topically 4 times a day as needed (pain). 50 g 3    omeprazole (PRILOSEC) 20 MG delayed-release capsule Take 1 Capsule by mouth every day. 30 Capsule 3    allopurinol (ZYLOPRIM) 100 MG Tab Take 2 Tablets by mouth every day. 90 Tablet 3    Omega-3 Fatty Acids (FISH OIL PO) Take  by mouth.      vitamin D3 (CHOLECALCIFEROL) 1000 Unit (25 mcg) Tab Take 1,000 Units by mouth every day.       No current facility-administered medications for this visit.    \"CURRENT RX\"    REVIEW OF SYSTEMS:  Constitutional: Denies weight loss, endorses chronic daytime fatigue.  See HPI      PHYSICAL EXAM/VITALS:  /70 (BP Location: Left arm, Patient Position: Sitting, BP Cuff Size: Large adult)   Pulse 97   Resp 16   Ht 1.854 m (6' 1\")   Wt 101 kg (222 lb 6.4 oz)   SpO2 93%   BMI 29.34 kg/m²   Appearance: Well-nourished, well-developed,  looks stated age, no acute distress  Eyes:   EOMI  ENMT:  WNL  Neck: Supple, trachea midline  Respiratory effort:  No intercostal retractions or use of accessory muscles  Musculoskeletal:  Grossly normal; gait and station normal  Neurologic:  oriented to person, time, place, and purpose; judgement intact  Psychiatric:  No depression, anxiety, agitation      MEDICAL DECISION MAKING:  The medical record was reviewed as it pertains to this " referral. This includes records from primary care,consultants notes, referral request, hospital records, labs and imaging. Any available diagnostic and titration nocturnal polysomnograms, home sleep apnea tests, continuous nocturnal oximetry results, multiple sleep latency tests, and recent compliance reports were reviewed with the patient.    ASSESSMENT/PLAN:  Gil Haskins is a 33 y.o.male with mild to very low moderate obstructive sleep apnea.  Patient is certainly a candidate to consider oral appliance.  This patient will interview dentists report back to this office regarding the need for a referral.  Should he decide against oral appliance therapy he certainly is a candidate for PAP therapy.  Also he does have positional sleep apnea.  His sleep apnea is worse when he is supine.  Patient was advised to look at positional sleep devices available online and select 1 that would prevent him from sleeping supine.  He will need to be restudied using a home sleep study while using his O AT and positional device,  to determine  effectiveness of treatment.      DIAGNOSES :    1. ISAURA (obstructive sleep apnea)    2. Positional sleep apnea    Other orders  - FENOFIBRATE PO; Take  by mouth.        The risks of untreated sleep apnea were discussed with the patient at length. Patients with ISAURA are at increased risk of cardiovascular disease including coronary artery disease, systemic arterial hypertension, pulmonary arterial hypertension, cardiac arrhythmias, and stroke. ISAURA patients have an increased risk of motor vehicle accidents, type 2 diabetes, chronic kidney disease, and non-alcoholic liver disease. The patient was advised to avoid driving a motor vehicle when drowsy.  Have advised the patient to follow up with the appropriate healthcare practitioners for all other medical problems and issues.    RETURN TO CLINIC: Return This patient is to return to this office for an HST using his oral appliance tx & positional  TX..    My total time spent caring for the patient on the day of the encounter was 40 minutes. This includes time spent on a thorough chart review including other physician notes, all sleep studies, as well as critical labs and pulmonary and cardiac studies.  Additionally, it includes a thorough discussion of good sleep hygiene and stimulus control, as well as  the need for consistency in terms of sleep preparation and practice.    Please note that this dictation was created using voice recognition software.  I have made every reasonable attempt to correct obvious errors, I expect that there are errors of grammar and possibly content that I did not discover before finalizing this note.

## 2023-08-18 ENCOUNTER — OFFICE VISIT (OUTPATIENT)
Dept: INTERNAL MEDICINE | Facility: OTHER | Age: 33
End: 2023-08-18
Payer: COMMERCIAL

## 2023-08-18 VITALS
OXYGEN SATURATION: 97 % | WEIGHT: 226 LBS | TEMPERATURE: 97.6 F | SYSTOLIC BLOOD PRESSURE: 117 MMHG | BODY MASS INDEX: 26.15 KG/M2 | HEIGHT: 78 IN | HEART RATE: 75 BPM | DIASTOLIC BLOOD PRESSURE: 77 MMHG

## 2023-08-18 DIAGNOSIS — D36.7 NASAL DERMOID CYST: ICD-10-CM

## 2023-08-18 DIAGNOSIS — L53.9 TOE ERYTHEMA: ICD-10-CM

## 2023-08-18 DIAGNOSIS — R00.0 TACHYCARDIA: ICD-10-CM

## 2023-08-18 DIAGNOSIS — G44.52 NEW DAILY PERSISTENT HEADACHE: ICD-10-CM

## 2023-08-18 PROCEDURE — 3074F SYST BP LT 130 MM HG: CPT | Performed by: STUDENT IN AN ORGANIZED HEALTH CARE EDUCATION/TRAINING PROGRAM

## 2023-08-18 PROCEDURE — 3078F DIAST BP <80 MM HG: CPT | Performed by: STUDENT IN AN ORGANIZED HEALTH CARE EDUCATION/TRAINING PROGRAM

## 2023-08-18 PROCEDURE — 99214 OFFICE O/P EST MOD 30 MIN: CPT | Mod: GC | Performed by: STUDENT IN AN ORGANIZED HEALTH CARE EDUCATION/TRAINING PROGRAM

## 2023-08-18 RX ORDER — CYCLOBENZAPRINE HCL 5 MG
5 TABLET ORAL NIGHTLY PRN
Qty: 30 TABLET | Refills: 1 | Status: SHIPPED | OUTPATIENT
Start: 2023-08-18 | End: 2023-11-09

## 2023-08-18 RX ORDER — PROPRANOLOL HCL 60 MG
60 CAPSULE, EXTENDED RELEASE 24HR ORAL DAILY
Qty: 30 CAPSULE | Refills: 1 | Status: SHIPPED | OUTPATIENT
Start: 2023-08-18 | End: 2023-10-01

## 2023-08-18 ASSESSMENT — FIBROSIS 4 INDEX: FIB4 SCORE: 0.48

## 2023-08-18 NOTE — PATIENT INSTRUCTIONS
Thank you for visiting today!  We will start you back on propranolol this will be a long acting 1 time a day medication you will take to hopefully help decrease the frequency of your headaches you continue to take ibuprofen or Tylenol, if the headaches get worse to a quart or more frequently, your symptoms start to change you start to have worsening dizziness or lightheadedness noticed that you have vision changes or hearing changes let us know immediately, and had to the urgent care/emergency department if we are unable to see you in a timely fashion.  Please follow-up in 6 weeks with Dr. Mark.   Please follow-up on referrals and schedule an appointment with dermatology, you should hear back about the referral in the next 5-10 business days.  Please get lab work done at least 5 days before next visit.  Please try and eat healthy, get at least 30 minutes of cardiovascular exercise a day to help keep your health as best as it can be.  If you have any questions or concerns please feel free to contact us at [phone].  If you feel like you are experiencing a medical emergency please seek immediate medical attention at an urgent care or in the emergency department.

## 2023-08-18 NOTE — PROGRESS NOTES
Established Patient    Patient Care Team:  Andie Mark M.D. as PCP - General (Internal Medicine)  Keaton Hudson, PT as Physical Therapist (Physical Therapy)    Gil Haskins is a 33 y.o. male who presents today with the following Chief Complaint(s): Follow up for Diagnoses of New daily persistent headache, Toe erythema, Nasal dermoid cyst, and Tachycardia were pertinent to this visit.    HPI:  1. New daily persistent headache  Patient with approximately 1-1/2-month history of headache, left side slightly greater than right, has occurred both with and independently of left shoulder pain which is chronic, patient states that the headache started approximately 1 week before he went to the ED on 7/13/2023.  Labs and imaging reviewed from ED visit at [facility], patient did not receive CT head, patient was seen for chest pain, patient states that he felt his palpitations which she has had in the past during that time however denies any lightheadedness or dizziness or chest pain.  Patient states that the ED provider is focused more on the chest pain and the headaches which was his actual complaint.  Patient has never had a headache like this before, patient states that headaches are mainly in the occipital region left side worse than right, can last 24 hours but usually alleviated quickly with ibuprofen, happening approximately 3 times a week however seem to be decreasing in frequency over the last 2-week, does not currently experience this headache denies any aura visual or otherwise.  Patient denies any focal weakness any vision changes, hearing changes, change in sensation, recent trauma to the head, fevers, chills, night sweats, weight change.  Snoop10 and negative.  Patient was given a muscle relaxer but did not want to take as he states he is taking these in the past and he is felt to are not to drive so he held off, but would be open to taking further muscle relaxers at  night to see if they may help.    2. Toe lesion  Patient with a lesion near his big toe has been there for at least several months, not growing, dark almost violaceous lesion, not changing in size, patient wishes to get checked out, he wears shoes when he goes outside, no previous history of skin malignancy denies trauma to the region.    3. Nasal dermoid cyst  History of small mobile cyst on the upper aspect of the patient's right nose near orbit, no change in size, patient denies any B symptoms, otherwise denies any eruptions any pus any surrounding cellulitis.  Not painful.    4. Tachycardia  Previous history of nonsustained V. tach, patient currently off of propranolol, denies any lightheadedness or dizziness when he was on the medication and states that his palpitations have decreased.  14 point review systems negative save for as above and below.      ROS:     General: No fevers, chills, night sweats, weight loss or gain  HEENT: No hearing changes, vision changes, eye pain, ear pain, nasal discharge, sore throat  Neck: No swelling in neck  Pulmonary: No shortness of breath, cough, sputum, or hemoptysis  Cardiovascular: No chest pain, palpitations, or LE swelling  GI: No nausea, vomiting, diarrhea, constipation, abdominal pain, hematochezia or melena  : No dysuria or frequency  Neuro: No focal weakness, no general weakness, no headaches, no lightheadedness, no dizziness  Psych: No anxiety or depression    Past Medical History:   Diagnosis Date    COPD (chronic obstructive pulmonary disease) (HCC)     GERD (gastroesophageal reflux disease)     Hyperlipidemia     Hypertension      Social History     Tobacco Use    Smoking status: Every Day     Packs/day: 0.50     Years: 12.00     Additional pack years: 0.00     Total pack years: 6.00     Types: Cigarettes     Start date: 1/1/2009    Smokeless tobacco: Never    Tobacco comments:     5 cigarettes daily and 14mg nicotine patch   Vaping Use    Vaping Use: Never  "used   Substance Use Topics    Alcohol use: Not Currently    Drug use: Never     Current Outpatient Medications   Medication Sig Dispense Refill    propranolol LA (INDERAL LA) 60 MG CAPSULE SR 24 HR Take 1 Capsule by mouth every day. 30 Capsule 1    cyclobenzaprine (FLEXERIL) 5 mg tablet Take 1 Tablet by mouth at bedtime as needed for Muscle Spasms (headaches). 30 Tablet 1    FENOFIBRATE PO Take  by mouth.      nicotine (NICODERM) 14 MG/24HR PATCH 24 HR Apply 1 patch topically every 24 hours for 28 days. 28 Patch 2    omeprazole (PRILOSEC) 20 MG delayed-release capsule Take 1 Capsule by mouth every day. 30 Capsule 3    allopurinol (ZYLOPRIM) 100 MG Tab Take 2 Tablets by mouth every day. 90 Tablet 3    Omega-3 Fatty Acids (FISH OIL PO) Take  by mouth.      vitamin D3 (CHOLECALCIFEROL) 1000 Unit (25 mcg) Tab Take 1,000 Units by mouth every day.      diclofenac sodium (VOLTAREN) 1 % Gel Apply 2 g topically 4 times a day as needed (pain). (Patient not taking: Reported on 8/18/2023) 50 g 3     No current facility-administered medications for this visit.       Physical Exam:  /77 (BP Location: Right arm, Patient Position: Sitting, BP Cuff Size: Adult long)   Pulse 75   Temp 36.4 °C (97.6 °F) (Temporal)   Ht 1.969 m (6' 5.5\")   Wt 103 kg (226 lb)   SpO2 97%   BMI 26.46 kg/m²   General: Well developed, well nourished male, in no distress.  HEENT: NC/AT, PERRL, EOMI, no scleral icterus or conjunctival pallor, fair dentition/denture in, no nasal discharge or oral erythema or exudates.   Neck: Supple, No cervical or supraclavicular LAD  CV:RRR, no murmurs gallops or rubs, no JVD  Pulm: LCAB, no crackles, rales, rhonchi, or wheezing  GI: Normal bowel sounds, abdomen soft, nontender, nondistended to deep or light palpation in all 4 quadrants  MSK: Small half millimeter soft mobile mass near 2 o'clock position on right orbit and nose, no sinus tract.  Patient with approximately 1 cm by at most half centimeter " irregularly shaped lesion on right great toe, no raised borders, no surrounding erythema.  Radial and dorsalis pedis pulses 2+ and equal bilaterally, respectively.  Strength 5 out of 5 in upper and lower extremities.  No lower extremity edema  Neuro: Patient is alert and oriented x3, no focal deficits  Psych: Appropriate mood and affect       Assessment and Plan:   1. New daily persistent headache  Needs criteria for persistent daily headache not having today no red flag signs or symptoms, has not had a CT of head however discussed strict return precautions and if any further concern for changing headaches or worsening, then would consider.  This headache as a possible musculoskeletal issue as it is somewhat right greater than left and occurs sometimes temp orally with his right shoulder pain.  Does not appear to be migraine, no previous history of headache of this type, no red flag signs or symptoms no B symptoms.  Appear to be improving slightly over time, aborted nearly completely with ibuprofen, did occur around the time patient states he stopped taking propranolol, could be slight component we will trial again propranolol extended release this time to see if that can decrease the frequency of these headaches, also trial low-dose Flexeril for shoulder pain.  -SNOOP 10 negative  - propranolol LA (INDERAL LA) 60 MG CAPSULE SR 24 HR; Take 1 Capsule by mouth every day.  Dispense: 30 Capsule; Refill: 1  - cyclobenzaprine (FLEXERIL) 5 mg tablet; Take 1 Tablet by mouth at bedtime as needed for Muscle Spasms (headaches).  Dispense: 30 Tablet; Refill: 1  -Discussed strict return precautions red flag signs and symptoms    2. Toe erythema  3. Nasal dermoid cyst  Chronic toe lesion and likely nasal dermoid cyst, differential for toe lesion remains broad as looks more like bruising but is not completely healed but if it has occurred like patient stated over the last several months without change in size this would not fit,  not raised, not painful or pruritic, nonblanching, irregular borders but otherwise consistent coloration, no B symptoms no history or family history of skin malignancies.  Will refer to dermatology as below.  - Referral to Dermatology    4. Tachycardia  Per patient history of nonsustained V. tach, improving symptomatology of palpitations even off of propranolol we will trial low-dose propranolol.        Return in about 6 weeks (around 9/29/2023).    Patient Instructions   Thank you for visiting today!  We will start you back on propranolol this will be a long acting 1 time a day medication you will take to hopefully help decrease the frequency of your headaches you continue to take ibuprofen or Tylenol, if the headaches get worse to a quart or more frequently, your symptoms start to change you start to have worsening dizziness or lightheadedness noticed that you have vision changes or hearing changes let us know immediately, and had to the urgent care/emergency department if we are unable to see you in a timely fashion.  Please follow-up in 6 weeks with Dr. Mark.   Please follow-up on referrals and schedule an appointment with dermatology, you should hear back about the referral in the next 5-10 business days.  Please get lab work done at least 5 days before next visit.  Please try and eat healthy, get at least 30 minutes of cardiovascular exercise a day to help keep your health as best as it can be.  If you have any questions or concerns please feel free to contact us at [phone].  If you feel like you are experiencing a medical emergency please seek immediate medical attention at an urgent care or in the emergency department.       Johanna Penaloza M.D. PGY 3  [facility]    This note was created using voice recognition software.  While every attempt is made to ensure accuracy of transcription, occasionally errors occur.

## 2023-09-29 ENCOUNTER — OFFICE VISIT (OUTPATIENT)
Dept: INTERNAL MEDICINE | Facility: OTHER | Age: 33
End: 2023-09-29
Payer: COMMERCIAL

## 2023-09-29 VITALS
BODY MASS INDEX: 30.19 KG/M2 | HEIGHT: 73 IN | DIASTOLIC BLOOD PRESSURE: 88 MMHG | SYSTOLIC BLOOD PRESSURE: 128 MMHG | WEIGHT: 227.8 LBS | OXYGEN SATURATION: 99 % | HEART RATE: 68 BPM | TEMPERATURE: 96.6 F

## 2023-09-29 DIAGNOSIS — R73.03 PREDIABETES: ICD-10-CM

## 2023-09-29 DIAGNOSIS — Z72.0 TOBACCO USE: ICD-10-CM

## 2023-09-29 DIAGNOSIS — Z87.39 PERSONAL HISTORY OF GOUT: ICD-10-CM

## 2023-09-29 DIAGNOSIS — K21.9 GASTROESOPHAGEAL REFLUX DISEASE, UNSPECIFIED WHETHER ESOPHAGITIS PRESENT: ICD-10-CM

## 2023-09-29 DIAGNOSIS — E79.0 HYPERURICEMIA: ICD-10-CM

## 2023-09-29 DIAGNOSIS — E78.1 HYPERTRIGLYCERIDEMIA: ICD-10-CM

## 2023-09-29 DIAGNOSIS — Z23 NEEDS FLU SHOT: ICD-10-CM

## 2023-09-29 DIAGNOSIS — R00.0 TACHYCARDIA: ICD-10-CM

## 2023-09-29 PROCEDURE — 90686 IIV4 VACC NO PRSV 0.5 ML IM: CPT | Mod: GC

## 2023-09-29 PROCEDURE — 99214 OFFICE O/P EST MOD 30 MIN: CPT | Mod: 25,GC

## 2023-09-29 PROCEDURE — 3079F DIAST BP 80-89 MM HG: CPT | Mod: GC

## 2023-09-29 PROCEDURE — 3074F SYST BP LT 130 MM HG: CPT | Mod: GC

## 2023-09-29 PROCEDURE — 90471 IMMUNIZATION ADMIN: CPT | Mod: GC

## 2023-09-29 RX ORDER — FAMOTIDINE 10 MG
10 TABLET ORAL 2 TIMES DAILY
Qty: 60 TABLET | Refills: 0 | Status: SHIPPED | OUTPATIENT
Start: 2023-09-29 | End: 2023-10-04 | Stop reason: SDUPTHER

## 2023-09-29 RX ORDER — PROPRANOLOL HYDROCHLORIDE 20 MG/1
20 TABLET ORAL 3 TIMES DAILY
Qty: 90 TABLET | Refills: 11 | Status: CANCELLED | OUTPATIENT
Start: 2023-09-29

## 2023-09-29 ASSESSMENT — FIBROSIS 4 INDEX: FIB4 SCORE: 0.48

## 2023-09-29 NOTE — PROGRESS NOTES
Established Patient    Patient Care Team:  Andie Mark M.D. as PCP - General (Internal Medicine)  Keaton Hudson, PT as Physical Therapist (Physical Therapy)    Gil Haskins is a 33 y.o. male who presents today with the following Chief Complaint(s): Follow up for Diagnoses of Gastroesophageal reflux disease, unspecified whether esophagitis present, Tachycardia, Tobacco use, Hyperuricemia, Personal history of gout, Hypertriglyceridemia, Prediabetes, and Needs flu shot were pertinent to this visit.    HPI:  Problem   Prediabetes    Hgb A1c 5.9 in 10/2022  Hgb A1c 6.0 in 1/2023  Has been seen by nutritionist  Cut out sugary beverages.  Still eating lots of white rice.   He is due for repeat Hgb A1c.       Tachycardia    Unclear etiology.   Zio patch 05/21/22 without concern for arrythmia; showed sinus tachy average rate of 97, max 175, and 1 episode of SVT lasting 5 beats.   Have considered possible nicotine/caffeine involvement vs panic attacks vs dehydration.   TSH wnl  Less concerned for pheo given normal BP.  No related shortness of breath, sweating, or other associated symptoms.   Patient was started on propranolol with improvement in symptoms. He had recently stopped the propranolol and began having headaches. The propranolol was restarted with the thought stopping it suddenly was contributing to headaches.   Today patient reports headaches are resolved.   He would still like to come off the propranolol if possible.        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 Has been on chronic omeprazole 20 mg for many years, reports  recently with increased heart burn symptoms.   Is unsure if ever been tested for H. Pylori.   Denies late night eating.  Denies n/v.       Tobacco Use    Still smoking 5 sticks/ day  Using nicotine patch, reports is helping        Here to follow up headaches, taccycardia.  Wishes to wean off propranolol.    ROS:     Denies any new chest pain or shortness of breath.  No changes to urinary or bowel function.  See HPI.    Past Medical History:   Diagnosis Date    COPD (chronic obstructive pulmonary disease) (HCC)     GERD (gastroesophageal reflux disease)     Hyperlipidemia     Hypertension      Social History     Tobacco Use    Smoking status: Every Day     Current packs/day: 0.50     Average packs/day: 0.5 packs/day for 14.7 years (7.4 ttl pk-yrs)     Types: Cigarettes     Start date: 1/1/2009    Smokeless tobacco: Never    Tobacco comments:     5 cigarettes daily and 14mg nicotine patch   Vaping Use    Vaping Use: Never used   Substance Use Topics    Alcohol use: Not Currently    Drug use: Never     Current Outpatient Medications   Medication Sig Dispense Refill    famotidine (PEPCID) 10 MG tablet Take 1 Tablet by mouth 2 times a day. 60 Tablet 0    propranolol LA (INDERAL LA) 60 MG CAPSULE SR 24 HR Take 1 Capsule by mouth every day. 30 Capsule 1    cyclobenzaprine (FLEXERIL) 5 mg tablet Take 1 Tablet by mouth at bedtime as needed for Muscle Spasms (headaches). 30 Tablet 1    FENOFIBRATE PO Take  by mouth.      nicotine (NICODERM) 14 MG/24HR PATCH 24 HR Apply 1 patch topically every 24 hours for 28 days. 28 Patch 2    omeprazole (PRILOSEC) 20 MG delayed-release capsule Take 1 Capsule by mouth every day. 30 Capsule 3    allopurinol (ZYLOPRIM) 100 MG Tab Take 2 Tablets by mouth every day. 90 Tablet 3    Omega-3 Fatty Acids (FISH OIL PO) Take  by mouth.      vitamin D3 (CHOLECALCIFEROL) 1000 Unit (25 mcg) Tab Take 1,000 Units by mouth every day.       No current facility-administered medications for this visit.  "    Physical Exam:  /88 (BP Location: Left arm, Patient Position: Sitting, BP Cuff Size: Adult)   Pulse 68   Temp 35.9 °C (96.6 °F) (Temporal)   Ht 1.854 m (6' 1\")   Wt 103 kg (227 lb 12.8 oz)   SpO2 99%   BMI 30.05 kg/m²   General: Well developed, well nourished male, in no distress.  Eyes: Conjuntiva without any obvious injection or erythema.   Cardiovascular: Heart is regular without murmur  Lungs: Clear to auscultation bilaterally. No wheezes, rhonci or crackles heard. Respiratory effort is normal.  Abd: Soft, non-tender  Ext: No edema      Assessment and Plan:   1. Gastroesophageal reflux disease, unspecified whether esophagitis present  Given patient's long term use of PPI's with persistent symptoms have sent referral to GI for likely EGD. In the meantime, will add famotidine to help with symptoms and provided instructions to take H.pylori breath test. Will have patient stop omeprazole 2 weeks prior to breath test and stop famotidine 2 days prior.     - Referral to Gastroenterology  - famotidine (PEPCID) 10 MG tablet; Take 1 Tablet by mouth 2 times a day.  Dispense: 60 Tablet; Refill: 0  - H. PYLORI BREATH TEST    2. Tachycardia  Wishes to stop propranolol which is reasonable. Given developed headaches after stopping last time, recommend tapering- instructions provided.   If symptoms of tachycardia return will resume medication.     3. Tobacco use  Cessation counseling provided. Patient is trying to quit and feels nicotine patch is helping.   Discussed ongoing tobacco use is likely contributing to GERD symptoms.     4. Hyperuricemia  5. Personal history of gout  Low disease activity with no recent flares.   Due for routine labs for disease and medication monitoring.     -CBC  -CMP  -Uric Acid    6. Hypertriglyceridemia  Due for labs.  Continue fenofibrate  Lifestyle modifications discussed    - Lipid Profile; Future    7. Prediabetes  Due for repeat lab  Lifestyle modifications discussed.    - " HEMOGLOBIN A1C; Future    8. Needs flu shot  - INFLUENZA VACCINE QUAD INJ (PF)    Return in about 5 weeks (around 11/3/2023) for reflux.    Patient Instructions   Is good seeing today Gil!    Please follow the instructions below for your reflux and weaning off the propranolol.  Otherwise you received the flu shot today.  Lets plan to follow-up in 5 to 6 weeks to see how your reflux symptoms are doing.  Feel free to reach out via MyChart sooner if needed.    For the reflux and H.pylori breath test:    Begin taking pepcid (famotidine) for two weeks while still on the prilosec.  In two weeks stop the prilosec for two weeks prior to taking the H.plylori breath test.  2 days before the breath test stop the pepcid.  It is ok to use tums for symptoms prn.    For weaning off propranolol:    Have sent in new prescription 20mg tablets. Recommend taking 40mg for 7 days, then 20 mg for 7 days, then 10 mg for seven days then off.     Andie Mark M.D. PGY3  [facility]

## 2023-09-29 NOTE — PATIENT INSTRUCTIONS
Is good seeing today Gil!    Please follow the instructions below for your reflux and weaning off the propranolol.  Otherwise you received the flu shot today.  Lets plan to follow-up in 5 to 6 weeks to see how your reflux symptoms are doing.  Feel free to reach out via [facility] sooner if needed.    For the reflux and H.pylori breath test:    Begin taking pepcid (famotidine) for two weeks while still on the prilosec.  In two weeks stop the prilosec for two weeks prior to taking the H.plylori breath test.  2 days before the breath test stop the pepcid.  It is ok to use tums for symptoms prn.    For weaning off propranolol:    Have sent in new prescription 20mg tablets. Recommend taking 40mg for 7 days, then 20 mg for 7 days, then 10 mg for seven days then off.

## 2023-10-01 RX ORDER — PROPRANOLOL HYDROCHLORIDE 20 MG/1
TABLET ORAL
Qty: 25 TABLET | Refills: 0 | Status: SHIPPED | OUTPATIENT
Start: 2023-10-01 | End: 2023-10-22

## 2023-10-02 ENCOUNTER — TELEPHONE (OUTPATIENT)
Dept: INTERNAL MEDICINE | Facility: OTHER | Age: 33
End: 2023-10-02
Payer: COMMERCIAL

## 2023-10-02 DIAGNOSIS — K21.9 GASTROESOPHAGEAL REFLUX DISEASE, UNSPECIFIED WHETHER ESOPHAGITIS PRESENT: ICD-10-CM

## 2023-10-02 NOTE — TELEPHONE ENCOUNTER
Incoming fax from pharmacy,     Unable to refill medication due to [facility] [place] pharmacy being out of stock.   Will contact patient to suggest sending medication to different pharmacy.

## 2023-10-03 NOTE — TELEPHONE ENCOUNTER
Phone Number Called: [phone] (home)      Call outcome: Did not leave a detailed message. Requested patient to call back.    Message: Needs a secondary pharmacy on file. Mail order pharmacy has medication on back file.

## 2023-10-04 NOTE — TELEPHONE ENCOUNTER
Phone Number Called: [phone] (home)      Call outcome: Spoke to patient regarding message below.    Message: Patient is aware Famotidine is not available at [facility] and would like medication to be sent to [facility] in [facility]. Pharmacy has been added to chart.

## 2023-10-06 RX ORDER — FAMOTIDINE 10 MG
10 TABLET ORAL 2 TIMES DAILY
Qty: 60 TABLET | Refills: 0 | Status: SHIPPED | OUTPATIENT
Start: 2023-10-06 | End: 2023-11-09 | Stop reason: SDUPTHER

## 2023-10-07 DIAGNOSIS — E78.1 HYPERTRIGLYCERIDEMIA: ICD-10-CM

## 2023-10-10 RX ORDER — FENOFIBRATE 145 MG/1
145 TABLET, COATED ORAL DAILY
Qty: 90 TABLET | Refills: 0 | Status: SHIPPED | OUTPATIENT
Start: 2023-10-10 | End: 2023-12-26

## 2023-10-12 DIAGNOSIS — Z72.0 TOBACCO USE: ICD-10-CM

## 2023-10-22 RX ORDER — NICOTINE 21 MG/24HR
PATCH, TRANSDERMAL 24 HOURS TRANSDERMAL
Qty: 28 PATCH | Refills: 1 | Status: SHIPPED | OUTPATIENT
Start: 2023-10-22 | End: 2023-11-09

## 2023-10-30 ENCOUNTER — HOSPITAL ENCOUNTER (OUTPATIENT)
Dept: LAB | Facility: MEDICAL CENTER | Age: 33
End: 2023-10-30
Payer: COMMERCIAL

## 2023-10-30 DIAGNOSIS — R73.03 PREDIABETES: ICD-10-CM

## 2023-10-30 DIAGNOSIS — E79.0 HYPERURICEMIA: ICD-10-CM

## 2023-10-30 DIAGNOSIS — E78.1 HYPERTRIGLYCERIDEMIA: ICD-10-CM

## 2023-10-30 LAB
ALBUMIN SERPL BCP-MCNC: 4.6 G/DL (ref 3.2–4.9)
ALBUMIN/GLOB SERPL: 1.6 G/DL
ALP SERPL-CCNC: 52 U/L (ref 30–99)
ALT SERPL-CCNC: 13 U/L (ref 2–50)
ANION GAP SERPL CALC-SCNC: 11 MMOL/L (ref 7–16)
AST SERPL-CCNC: 13 U/L (ref 12–45)
BASOPHILS # BLD AUTO: 0.5 % (ref 0–1.8)
BASOPHILS # BLD: 0.03 K/UL (ref 0–0.12)
BILIRUB SERPL-MCNC: 0.3 MG/DL (ref 0.1–1.5)
BUN SERPL-MCNC: 15 MG/DL (ref 8–22)
CALCIUM ALBUM COR SERPL-MCNC: 8.9 MG/DL (ref 8.5–10.5)
CALCIUM SERPL-MCNC: 9.4 MG/DL (ref 8.5–10.5)
CHLORIDE SERPL-SCNC: 103 MMOL/L (ref 96–112)
CHOLEST SERPL-MCNC: 155 MG/DL (ref 100–199)
CO2 SERPL-SCNC: 24 MMOL/L (ref 20–33)
CREAT SERPL-MCNC: 1.12 MG/DL (ref 0.5–1.4)
EOSINOPHIL # BLD AUTO: 0.21 K/UL (ref 0–0.51)
EOSINOPHIL NFR BLD: 3.2 % (ref 0–6.9)
ERYTHROCYTE [DISTWIDTH] IN BLOOD BY AUTOMATED COUNT: 41.9 FL (ref 35.9–50)
EST. AVERAGE GLUCOSE BLD GHB EST-MCNC: 114 MG/DL
FASTING STATUS PATIENT QL REPORTED: NORMAL
GFR SERPLBLD CREATININE-BSD FMLA CKD-EPI: 89 ML/MIN/1.73 M 2
GLOBULIN SER CALC-MCNC: 2.9 G/DL (ref 1.9–3.5)
GLUCOSE SERPL-MCNC: 97 MG/DL (ref 65–99)
HBA1C MFR BLD: 5.6 % (ref 4–5.6)
HCT VFR BLD AUTO: 45.2 % (ref 42–52)
HDLC SERPL-MCNC: 39 MG/DL
HGB BLD-MCNC: 14.7 G/DL (ref 14–18)
IMM GRANULOCYTES # BLD AUTO: 0.02 K/UL (ref 0–0.11)
IMM GRANULOCYTES NFR BLD AUTO: 0.3 % (ref 0–0.9)
LDLC SERPL CALC-MCNC: 90 MG/DL
LYMPHOCYTES # BLD AUTO: 1.81 K/UL (ref 1–4.8)
LYMPHOCYTES NFR BLD: 27.8 % (ref 22–41)
MCH RBC QN AUTO: 28.3 PG (ref 27–33)
MCHC RBC AUTO-ENTMCNC: 32.5 G/DL (ref 32.3–36.5)
MCV RBC AUTO: 86.9 FL (ref 81.4–97.8)
MONOCYTES # BLD AUTO: 0.47 K/UL (ref 0–0.85)
MONOCYTES NFR BLD AUTO: 7.2 % (ref 0–13.4)
NEUTROPHILS # BLD AUTO: 3.97 K/UL (ref 1.82–7.42)
NEUTROPHILS NFR BLD: 61 % (ref 44–72)
NRBC # BLD AUTO: 0 K/UL
NRBC BLD-RTO: 0 /100 WBC (ref 0–0.2)
PLATELET # BLD AUTO: 264 K/UL (ref 164–446)
PMV BLD AUTO: 11.9 FL (ref 9–12.9)
POTASSIUM SERPL-SCNC: 4.4 MMOL/L (ref 3.6–5.5)
PROT SERPL-MCNC: 7.5 G/DL (ref 6–8.2)
RBC # BLD AUTO: 5.2 M/UL (ref 4.7–6.1)
SODIUM SERPL-SCNC: 138 MMOL/L (ref 135–145)
TRIGL SERPL-MCNC: 128 MG/DL (ref 0–149)
URATE SERPL-MCNC: 5.1 MG/DL (ref 2.5–8.3)
WBC # BLD AUTO: 6.5 K/UL (ref 4.8–10.8)

## 2023-10-30 PROCEDURE — 85025 COMPLETE CBC W/AUTO DIFF WBC: CPT

## 2023-10-30 PROCEDURE — 84550 ASSAY OF BLOOD/URIC ACID: CPT

## 2023-10-30 PROCEDURE — 36415 COLL VENOUS BLD VENIPUNCTURE: CPT

## 2023-10-30 PROCEDURE — 83036 HEMOGLOBIN GLYCOSYLATED A1C: CPT

## 2023-10-30 PROCEDURE — 80053 COMPREHEN METABOLIC PANEL: CPT

## 2023-10-30 PROCEDURE — 80061 LIPID PANEL: CPT

## 2023-11-09 ENCOUNTER — OFFICE VISIT (OUTPATIENT)
Dept: INTERNAL MEDICINE | Facility: OTHER | Age: 33
End: 2023-11-09
Payer: COMMERCIAL

## 2023-11-09 VITALS
SYSTOLIC BLOOD PRESSURE: 132 MMHG | HEIGHT: 73 IN | HEART RATE: 74 BPM | WEIGHT: 229.4 LBS | TEMPERATURE: 96.3 F | DIASTOLIC BLOOD PRESSURE: 90 MMHG | OXYGEN SATURATION: 93 % | BODY MASS INDEX: 30.4 KG/M2

## 2023-11-09 DIAGNOSIS — M25.561 ACUTE PAIN OF RIGHT KNEE: ICD-10-CM

## 2023-11-09 DIAGNOSIS — Z11.3 ROUTINE SCREENING FOR STI (SEXUALLY TRANSMITTED INFECTION): ICD-10-CM

## 2023-11-09 DIAGNOSIS — E55.9 VITAMIN D DEFICIENCY: ICD-10-CM

## 2023-11-09 DIAGNOSIS — K21.9 GASTROESOPHAGEAL REFLUX DISEASE, UNSPECIFIED WHETHER ESOPHAGITIS PRESENT: ICD-10-CM

## 2023-11-09 DIAGNOSIS — S81.802A WOUND OF LEFT LOWER EXTREMITY, INITIAL ENCOUNTER: Primary | ICD-10-CM

## 2023-11-09 PROCEDURE — 3075F SYST BP GE 130 - 139MM HG: CPT | Mod: GC

## 2023-11-09 PROCEDURE — 3080F DIAST BP >= 90 MM HG: CPT | Mod: GC

## 2023-11-09 PROCEDURE — 99214 OFFICE O/P EST MOD 30 MIN: CPT | Mod: GC

## 2023-11-09 RX ORDER — IBUPROFEN 200 MG
500 CAPSULE ORAL DAILY
COMMUNITY

## 2023-11-09 RX ORDER — FAMOTIDINE 10 MG
10 TABLET ORAL 2 TIMES DAILY
Qty: 60 TABLET | Refills: 0 | Status: SHIPPED | OUTPATIENT
Start: 2023-11-09

## 2023-11-09 ASSESSMENT — FIBROSIS 4 INDEX: FIB4 SCORE: 0.44

## 2023-11-09 NOTE — PROGRESS NOTES
Established Patient    Patient Care Team:  Andie Mark M.D. as PCP - General (Internal Medicine)  Keaton Hudson, PT as Physical Therapist (Physical Therapy)    Gil Haskins is a 33 y.o. male who presents today with the following Chief Complaint(s): Follow up for The primary encounter diagnosis was Wound of left lower extremity, initial encounter. Diagnoses of Acute pain of right knee, Gastroesophageal reflux disease, unspecified whether esophagitis present, Vitamin D deficiency, and Routine screening for STI (sexually transmitted infection) were also pertinent to this visit.    HPI:  Problem   Tachycardia    Sinus tachycardia.   Zio patch 05/21/22 without concern for arrythmia; showed sinus tachy average rate of 97, max 175, and 1 episode of SVT lasting 5 beats.   Have considered possible nicotine/caffeine involvement vs panic attacks vs dehydration.   TSH wnl  Less concerned for pheo given normal BP.  No related shortness of breath, sweating, or other associated symptoms.   Patient was on propranolol for some time, though successfully tapered off 10/2023.  Currently asymptomatic         Leg Wound, Left    14 days ago he was standing [place] when a log bumped into his left lower leg and cut him.  He has been cleaning with water, soap, and neospororin and bandaid. He has not been able to keep open to air due to discomfort of it rubbing on sheets.  He feels like initially seemed like it was healing though is concerned that for past week the healing has stalled.   Does not have history of poor wound healing  Is up to date on tetanus booster 10/2022.     Acute Pain of Right Knee    Started hurting 1 week ago. Has history of gout though never in knee joints. Is on allopurinol and last uric acid level was at goal.  [place]. Pain started 1 week after travel and did not have any symptoms during car trip.  No recent falls or change in activity  Had  similar pain before for which he completed physical therapy with resolution of symptoms.  He feels this pain is worse than prior, describes as a deep pain in the knee that will get up to an 8/10. He has had significant pain with moving (bending and straightening) the knee.  Has been taking ibuprofen and icing with some improvement.         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 Has been on chronic omeprazole 20 mg for many years, reports recently with increased heart burn symptoms.   Is unsure if ever been tested for H. Pylori.   Denies late night eating.  Denies n/v.    Pending [facility] appointment on 11/10  Was not able to complete H.pylori breath test            ROS:     Denies any new chest pain or shortness of breath.  No changes to urinary or bowel function.  See HPI.    Past Medical History:   Diagnosis Date    COPD (chronic obstructive pulmonary disease) ([facility])     GERD (gastroesophageal reflux disease)     Hyperlipidemia     Hypertension      Social History     Tobacco Use    Smoking status: Every Day     Current packs/day: 0.50     Average packs/day: 0.5 packs/day for 14.9 years (7.4 ttl pk-yrs)     Types: Cigarettes     Start date: 1/1/2009    Smokeless tobacco: Never    Tobacco comments:     5 cigarettes daily and 14mg nicotine patch   Vaping Use    Vaping Use: Never used   Substance Use Topics    Alcohol use: Not Currently    Drug use: Never     Current Outpatient Medications   Medication Sig Dispense Refill    calcium carbonate (OS-ROLANDO 500) 1250 (500 Ca) MG Tab Take 500 mg by mouth every day.      famotidine (PEPCID) 10 MG tablet Take 1 Tablet by mouth 2 times a day. 60 Tablet 0    fenofibrate (TRICOR) 145 MG Tab Take 1 tablet by mouth daily. 90 Tablet 0    FENOFIBRATE PO Take  by mouth.      omeprazole (PRILOSEC) 20 MG delayed-release capsule Take 1 Capsule by mouth every day. 30 Capsule 3    allopurinol  "(ZYLOPRIM) 100 MG Tab Take 2 Tablets by mouth every day. 90 Tablet 3    Omega-3 Fatty Acids (FISH OIL PO) Take  by mouth.      vitamin D3 (CHOLECALCIFEROL) 1000 Unit (25 mcg) Tab Take 1,000 Units by mouth every day.       No current facility-administered medications for this visit.     Physical Exam:  BP (!) 132/90 (BP Location: Left arm, Patient Position: Sitting, BP Cuff Size: Adult)   Pulse 74   Temp (!) 35.7 °C (96.3 °F) (Temporal)   Ht 1.854 m (6' 1\")   Wt 104 kg (229 lb 6.4 oz)   SpO2 93%   BMI 30.27 kg/m²   General: Well developed, well nourished male, in no distress.  Eyes: Conjuntiva without any obvious injection or erythema.   Cardiovascular: Heart is regular without murmur  Lungs: Clear to auscultation bilaterally. No wheezes, rhonci or crackles heard. Respiratory effort is normal.  Abd: Soft, non-tender  Ext: No edema, well healing wound on left lower lateral leg with healthy granulation tissue. No fluctuance or oozing.  MSK: R knee with no gross abnormalities, no swelling or tenderness to palpation. Has pain with active extension.       Assessment and Plan:   1. Wound of left lower extremity, initial encounter  Wound appears to be healing well.  Recommended to discontinue washing with soap and to just use a little water. Ok to discontinue neosporin and as able leave open to air.   Discussed signs/symptoms that would warrant re-evaluation in clinic.    2. Acute pain of right knee  Suspect this is likely due to hip/glute weakness. Less concerned for gout or inflammatory arthritis  Will send back to PT.  Appreciate orthopedics assistance in further workup/management.    - Referral to Physical Therapy  - Referral to Orthopedics    3. Gastroesophageal reflux disease, unspecified whether esophagitis present  Pending appointment with GI on 11/10.   Advised to hold off on h.plyori breath test as will likely will be getting an EGD. Patient to reach out if any questions or concerns after seeing GI.  Never " started famotidine and still having breakthrough reflux symptoms on PPI.  Ok to start the pepcid and follow up with GI recs.     - famotidine (PEPCID) 10 MG tablet; Take 1 Tablet by mouth 2 times a day.  Dispense: 60 Tablet; Refill: 0    4. Vitamin D deficiency  Due for repeat lab  - VITAMIN D,25 HYDROXY (DEFICIENCY); Future    5. Routine screening for STI (sexually transmitted infection)  Patient has never been screened.     - HIV AG/AB COMBO ASSAY SCREENING; Future  - HEP C VIRUS ANTIBODY; Future    Other orders  - calcium carbonate (OS-ROLANDO 500) 1250 (500 Ca) MG Tab; Take 500 mg by mouth every day.    Return in about 3 months (around 2/9/2024) for knee pain.    Patient Instructions   It was good to see you today Gil!    The wound on your leg appears to be healing well.  The white stuff is something called healthy granulation tissue.  It is okay to stop using soap to clean the area and to back off on the Neosporin.  As you are able please leave it open to air to kind of dry out.  If you notice that it is getting worse or is oozing pus, please return to clinic so this can be reevaluated.    For your right knee pain I have sent in a new referral to physical therapy as well as a referral to orthopedics to have this further evaluated.  Given your persistent reflux disease, caution taking too much of the ibuprofen.  May try Tylenol instead.    After your visit with [facility] tomorrow please send me a [facility] message updating me on what they said.    Plan to follow-up in 3 months to see how your knee is going.  Feel free to reach out sooner if needed.        Andie Mark M.D. PGY3  [facility]

## 2023-11-09 NOTE — PATIENT INSTRUCTIONS
It was good to see you today Gil!    The wound on your leg appears to be healing well.  The white stuff is something called healthy granulation tissue.  It is okay to stop using soap to clean the area and to back off on the Neosporin.  As you are able please leave it open to air to kind of dry out.  If you notice that it is getting worse or is oozing pus, please return to clinic so this can be reevaluated.    For your right knee pain I have sent in a new referral to physical therapy as well as a referral to orthopedics to have this further evaluated.  Given your persistent reflux disease, caution taking too much of the ibuprofen.  May try Tylenol instead.    After your visit with [facility] tomorrow please send me a [facility] message updating me on what they said.    Plan to follow-up in 3 months to see how your knee is going.  Feel free to reach out sooner if needed.

## 2023-11-19 DIAGNOSIS — Z11.3 ROUTINE SCREENING FOR STI (SEXUALLY TRANSMITTED INFECTION): ICD-10-CM

## 2023-12-24 DIAGNOSIS — E78.1 HYPERTRIGLYCERIDEMIA: ICD-10-CM

## 2023-12-26 RX ORDER — FENOFIBRATE 145 MG/1
145 TABLET, COATED ORAL DAILY
Qty: 90 TABLET | Refills: 0 | Status: SHIPPED | OUTPATIENT
Start: 2023-12-26

## 2024-02-06 ENCOUNTER — OFFICE VISIT (OUTPATIENT)
Dept: INTERNAL MEDICINE | Facility: OTHER | Age: 34
End: 2024-02-06
Payer: COMMERCIAL

## 2024-02-06 VITALS
DIASTOLIC BLOOD PRESSURE: 83 MMHG | TEMPERATURE: 97.5 F | WEIGHT: 215.6 LBS | HEIGHT: 74 IN | OXYGEN SATURATION: 99 % | SYSTOLIC BLOOD PRESSURE: 119 MMHG | BODY MASS INDEX: 27.67 KG/M2 | HEART RATE: 81 BPM

## 2024-02-06 DIAGNOSIS — H43.392 VITREOUS FLOATERS OF LEFT EYE: ICD-10-CM

## 2024-02-06 DIAGNOSIS — Z30.09 FAMILY PLANNING EDUCATION, GUIDANCE, AND COUNSELING: Primary | ICD-10-CM

## 2024-02-06 DIAGNOSIS — K20.80 LOS ANGELES GRADE B ESOPHAGITIS: ICD-10-CM

## 2024-02-06 DIAGNOSIS — M54.40 BACK PAIN OF LUMBAR REGION WITH SCIATICA: ICD-10-CM

## 2024-02-06 DIAGNOSIS — M76.899 QUADRICEPS TENDONITIS: ICD-10-CM

## 2024-02-06 PROBLEM — H57.9 EYE PROBLEM: Status: ACTIVE | Noted: 2024-02-06

## 2024-02-06 PROCEDURE — 3074F SYST BP LT 130 MM HG: CPT | Mod: GC

## 2024-02-06 PROCEDURE — 99213 OFFICE O/P EST LOW 20 MIN: CPT | Mod: GE

## 2024-02-06 PROCEDURE — 3079F DIAST BP 80-89 MM HG: CPT | Mod: GC

## 2024-02-06 RX ORDER — PANTOPRAZOLE SODIUM 40 MG/1
40 TABLET, DELAYED RELEASE ORAL DAILY
COMMUNITY

## 2024-02-06 ASSESSMENT — PATIENT HEALTH QUESTIONNAIRE - PHQ9: CLINICAL INTERPRETATION OF PHQ2 SCORE: 0

## 2024-02-06 ASSESSMENT — FIBROSIS 4 INDEX: FIB4 SCORE: 0.44

## 2024-02-06 NOTE — PATIENT INSTRUCTIONS
Good to see today Gil!    Today we reviewed your visit with [facility].  There seems to be some confusion about whether or not you have Trevino's versus esophagitis.  Recommend touching base with them for clarification and if and when you need follow-up.  Otherwise continue to take the famotidine and pantoprazole per their recommendations.    We discussed to the eye floater in the left eye.  Seems fairly benign, recommend that you follow-up with your eye doctor for further evaluation.    Regarding family-planning, typically we refrain from any testing until you and your wife have been actively trying to conceive for at least a year.  As discussed encourage you both to track her ovulation cycles and to time intercourse accordingly.    For your knee and back pain.  Will request records from [facility].  Encourage you to reestablish with physical therapy to optimize improvement in your anatomy while your pain is better under control.      Plan to follow-up in 3 months to see how your knee and back pain is doing.  During this time please get the labs that were ordered at her last visit completed.  Feel free to reach out sooner if needed.

## 2024-02-06 NOTE — PROGRESS NOTES
Established Patient    Patient Care Team:  Andie Mark M.D. as PCP - General (Internal Medicine)  Keaton Hudson, PT as Physical Therapist (Physical Therapy)    Gil Haskins is a 33 y.o. male who presents today with the following Chief Complaint(s): Follow up for The primary encounter diagnosis was Family planning education, guidance, and counseling. Diagnoses of Eye problem, Back pain of lumbar region with sciatica, Quadriceps tendonitis, and [place] grade B esophagitis were also pertinent to this visit.    HPI:  Problem   Eye Problem    Started noticing eye floater in the left eye in the LUQ about 2 weeks ago.     No pain, discharge, vision changes.     Last saw optometry a few months ago.      Back Pain of Lumbar Region With Sciatica    Was referred to [facility] for back pain.   Received an injection at the beginning of the year on the right side with improved symptoms.  Will request records       Family Planning Education, Guidance, and Counseling    Presents with wife and is wondering about testing for fertility.   Has been actively trying to conceive for the past 4 months.  Wife reportedly has no health issues and regular menstruation.  They have been using the health phone blessing to track menstruation.     [place] Grade B Esophagitis    Had EGD 12/2023. Per patient he was told he has Trevino's however [facility] [place] grade B esophagitis. He was recommended to switch to pantoprazole and to continue famotidine.      Quadriceps Tendonitis    Recurrent knee pain that started up again 11/2023.  Had similar pain before for which he completed physical therapy with resolution of symptoms.  He has significant pain with moving (bending and straightening) the knee.    Evaluated by [facility] and diagnosed with quadriceps tendonitis.   Pending restarting PT.             ROS:     Denies any new chest pain or shortness of breath.  No changes to urinary or bowel function.  "  See HPI.    Past Medical History:   Diagnosis Date    COPD (chronic obstructive pulmonary disease) (HCC)     GERD (gastroesophageal reflux disease)     Hyperlipidemia     Hypertension      Social History     Tobacco Use    Smoking status: Every Day     Current packs/day: 0.50     Average packs/day: 0.5 packs/day for 15.1 years (7.5 ttl pk-yrs)     Types: Cigarettes     Start date: 1/1/2009    Smokeless tobacco: Never    Tobacco comments:     5 cigarettes daily and 14mg nicotine patch   Vaping Use    Vaping Use: Never used   Substance Use Topics    Alcohol use: Not Currently    Drug use: Never     Current Outpatient Medications   Medication Sig Dispense Refill    pantoprazole (PROTONIX) 40 MG Tablet Delayed Response Take 40 mg by mouth every day.      fenofibrate (TRICOR) 145 MG Tab Take 1 tablet by mouth daily. 90 Tablet 0    calcium carbonate (OS-ROLANDO 500) 1250 (500 Ca) MG Tab Take 500 mg by mouth every day.      famotidine (PEPCID) 10 MG tablet Take 1 Tablet by mouth 2 times a day. 60 Tablet 0    FENOFIBRATE PO Take  by mouth.      allopurinol (ZYLOPRIM) 100 MG Tab Take 2 Tablets by mouth every day. 90 Tablet 3    Omega-3 Fatty Acids (FISH OIL PO) Take  by mouth.      vitamin D3 (CHOLECALCIFEROL) 1000 Unit (25 mcg) Tab Take 1,000 Units by mouth every day.       No current facility-administered medications for this visit.     Physical Exam:  /83 (BP Location: Left arm, Patient Position: Sitting, BP Cuff Size: Adult)   Pulse 81   Temp 36.4 °C (97.5 °F) (Temporal)   Ht 1.88 m (6' 2\")   Wt 97.8 kg (215 lb 9.6 oz)   SpO2 99%   BMI 27.68 kg/m²   General: Well developed, well nourished male, in no distress.  Eyes: Conjuntiva without any obvious injection or erythema.   Cardiovascular: Heart is regular with out murmur  Lungs: Clear to auscultation bilaterally. No wheezes, rhonci or crackles heard. Respiratory effort is normal.  Abd: Soft, non-tender  Ext: No edema    Assessment and Plan:   1. Family planning " education, guidance, and counseling  Advised continue to track menstrual cycle and ovulation with over the counter ovulation tests to time intercourse accordingly. Given patient and wife have been actively trying for only 4 months, will hold off on any testing.   If after 1 year they have not been able to conceive would consider fetility testing and referral to a specialist.     2. Vitreous floater of left eye  Patient's description suspicious for benign floater of the left eye.  Recommend patient follow-up with his eye doctor for further evaluation.    3. Back pain of lumbar region with sciatica  Reportedly patient had imaging at outside facility along with injection.  Currently asymptomatic.  Will request records from [facility].    4. Quadriceps tendonitis  Improvement in knee pain following low back injection.  Pending starting PT.  Encourage patient to to best optimize movement in his symptoms.    5. [place] grade B esophagitis  Continue pantoprazole and famotidine per GI recommendations.  Advised patient to follow-up with GI clarification on his diagnosis since they told him he has Trevino's.    Other orders  - pantoprazole (PROTONIX) 40 MG Tablet Delayed Response; Take 40 mg by mouth every day.    Return in about 3 months (around 5/6/2024) for r knee pain.  Patient Instructions   Good to see you today Gil!    Today we reviewed your visit with [facility].  There seems to be some confusion about whether or not you have Trevino's versus esophagitis.  Recommend touching base with them for clarification and if and when you need follow-up.  Otherwise continue to take the famotidine and pantoprazole per their recommendations.    We discussed to the eye floater in the left eye.  Seems fairly benign, recommend that you follow-up with your eye doctor for further evaluation.    Regarding family-planning, typically we refrain from any testing until you and your wife have been actively trying to conceive for at least a  year.  As discussed encourage you both to track her ovulation cycles and to time intercourse accordingly.    For your knee and back pain.  Will request records from [facility].  Encourage you to reestablish with physical therapy to optimize improvement in your symptoms while your pain is better under control.      Plan to follow-up in 3 months to see how your knee and back pain are doing.  During this time please get the labs that were ordered at our last visit completed.  Feel free to reach out sooner if needed.      Andie Mark M.D. PGY3  [facility]

## 2024-02-06 NOTE — LETTER
[facility]  Andie Mark M.D.  [address] [place] [address]  Fax: [phone]   Authorization for Release/Disclosure of   Protected Health Information   Name: GIL HASKINS : 1990 SSN: [id number]   Address: [address]  [place] [address] Phone:    [phone] (home)    I authorize the entity listed below to release/disclose the PHI below to:   [facility]/Andie Mark M.D. and Andie Mark M.D.   Provider or Entity Name:     Address   City, State, Zip   Phone:      Fax:     Reason for request: continuity of care   Information to be released:    [  ] LAST COLONOSCOPY,  including any PATH REPORT and follow-up  [  ] LAST FIT/COLOGUARD RESULT [  ] LAST DEXA  [  ] LAST MAMMOGRAM  [  ] LAST PAP  [  ] LAST LABS [  ] RETINA EXAM REPORT  [  ] IMMUNIZATION RECORDS  [  ] Release all info      [  ] Check here and initial the line next to each item to release ALL health information INCLUDING  _____ Care and treatment for drug and / or alcohol abuse  _____ HIV testing, infection status, or AIDS  _____ Genetic Testing    DATES OF SERVICE OR TIME PERIOD TO BE DISCLOSED: _____________  I understand and acknowledge that:  * This Authorization may be revoked at any time by you in writing, except if your health information has already been used or disclosed.  * Your health information that will be used or disclosed as a result of you signing this authorization could be re-disclosed by the recipient. If this occurs, your re-disclosed health information may no longer be protected by State or Federal laws.  * You may refuse to sign this Authorization. Your refusal will not affect your ability to obtain treatment.  * This Authorization becomes effective upon signing and will  on (date) __________.      If no date is indicated, this Authorization will  one (1) year from the signature date.    Name: Gil Haskins  Signature: Date:   2024     PLEASE FAX REQUESTED RECORDS  BACK TO: [phone]

## 2024-02-20 DIAGNOSIS — E79.0 HYPERURICEMIA: ICD-10-CM

## 2024-02-20 DIAGNOSIS — Z87.39 PERSONAL HISTORY OF GOUT: ICD-10-CM

## 2024-02-20 NOTE — TELEPHONE ENCOUNTER
Received request via: Pharmacy    Was the patient seen in the last year in this department? Yes    Does the patient have an active prescription (recently filled or refills available) for medication(s) requested? No    Pharmacy Name:   [facility] - [place], [place] - [address]          Does the patient have FPC Plus and need 100 day supply (blood pressure, diabetes and cholesterol meds only)? Patient does not have SCP

## 2024-02-21 RX ORDER — ALLOPURINOL 100 MG/1
200 TABLET ORAL DAILY
Qty: 90 TABLET | Refills: 2 | Status: SHIPPED | OUTPATIENT
Start: 2024-02-21

## 2024-04-18 DIAGNOSIS — E78.1 HYPERTRIGLYCERIDEMIA: ICD-10-CM

## 2024-04-18 NOTE — TELEPHONE ENCOUNTER
Received request via: Pharmacy    Was the patient seen in the last year in this department? Yes    Does the patient have an active prescription (recently filled or refills available) for medication(s) requested? No    Pharmacy Name: "[facility]    Does the patient have [facility] Plus and need 100 day supply (blood pressure, diabetes and cholesterol meds only)? Patient does not have SCP

## 2024-04-22 RX ORDER — FENOFIBRATE 145 MG/1
145 TABLET, COATED ORAL DAILY
Qty: 90 TABLET | Refills: 0 | Status: SHIPPED | OUTPATIENT
Start: 2024-04-22

## 2024-06-03 ENCOUNTER — APPOINTMENT (OUTPATIENT)
Dept: INTERNAL MEDICINE | Facility: OTHER | Age: 34
End: 2024-06-03
Payer: COMMERCIAL

## 2024-06-03 VITALS
TEMPERATURE: 97.5 F | SYSTOLIC BLOOD PRESSURE: 110 MMHG | BODY MASS INDEX: 27.77 KG/M2 | OXYGEN SATURATION: 96 % | DIASTOLIC BLOOD PRESSURE: 78 MMHG | HEART RATE: 92 BPM | HEIGHT: 74 IN | WEIGHT: 216.4 LBS

## 2024-06-03 DIAGNOSIS — Z30.09 FAMILY PLANNING EDUCATION, GUIDANCE, AND COUNSELING: ICD-10-CM

## 2024-06-03 DIAGNOSIS — G89.29 CHRONIC RIGHT HIP PAIN: ICD-10-CM

## 2024-06-03 DIAGNOSIS — M76.899 QUADRICEPS TENDONITIS: ICD-10-CM

## 2024-06-03 DIAGNOSIS — M25.551 CHRONIC RIGHT HIP PAIN: ICD-10-CM

## 2024-06-03 DIAGNOSIS — R91.1 LUNG NODULE: ICD-10-CM

## 2024-06-03 PROBLEM — S81.802A LEG WOUND, LEFT: Status: RESOLVED | Noted: 2023-11-09 | Resolved: 2024-06-03

## 2024-06-03 PROCEDURE — 3078F DIAST BP <80 MM HG: CPT | Mod: GC

## 2024-06-03 PROCEDURE — 99214 OFFICE O/P EST MOD 30 MIN: CPT | Mod: GC

## 2024-06-03 PROCEDURE — 3074F SYST BP LT 130 MM HG: CPT | Mod: GC

## 2024-06-03 ASSESSMENT — FIBROSIS 4 INDEX: FIB4 SCORE: 0.45

## 2024-06-03 NOTE — PROGRESS NOTES
"    Established Patient    Patient Care Team:  Andie Mark M.D. as PCP - General (Internal Medicine)  Keaton Hudson, PT as Physical Therapist (Physical Therapy)    Gil Haskins is a 34 y.o. male who presents today with the following Chief Complaint(s): Follow up for Diagnoses of Chronic right hip pain, Quadriceps tendonitis, Family planning education, guidance, and counseling, and Lung nodule were pertinent to this visit.    HPI:  Problem   Lung Nodule    biopsied back in [facility] and was previously on inhalers. Currently not having any shortness of breath, cough, or feeling wheezy.   PFTs in 11/2022 without any evidence of lung disease  Do not have any chest imaging in chart to review for correlation.      Chronic Right Hip Pain    Was referred to [facility] from [facility] for back/hip pain.   Received an injection at the beginning of the year on the right side with improved symptoms though effects have worn off. Pain is localized over right posterolateral hip/ low back region. Denies an radiating pain though reports pain \"jumps\" from hip/back to knee.          Family Planning Education, Guidance, and Counseling    Has been actively trying to conceive for the past 6 months.  Wife reportedly has no health issues and regular menstruation.  They have been using the health phone blessing to track menstruation and using ovulation strips.      Quadriceps Tendonitis    Recurrent knee pain that started up again 11/2023.  Had similar pain before for which he completed physical therapy with resolution of symptoms.  He has significant pain with moving (bending and straightening) the knee.    Evaluated by [facility] and diagnosed with quadriceps tendonitis.   Has restarted doing home exercises with improvement.        Leg Wound, Left (Resolved)    14 days ago he was standing [place] when a log bumped into his left lower leg and cut him.  He has been cleaning with water, soap, and neospororin and bandaid. He has not been " able to keep open to air due to discomfort of it rubbing on sheets.  He feels like initially seemed like it was healing though is concerned that for past week the healing has stalled.   Does not have history of poor wound healing  Is up to date on tetanus booster 10/2022.          ROS:     Denies any new chest pain or shortness of breath.  No changes to urinary or bowel function.   See HPI.    Past Medical History:   Diagnosis Date    COPD (chronic obstructive pulmonary disease) (HCC)     GERD (gastroesophageal reflux disease)     Hyperlipidemia     Hypertension     Leg wound, left 11/09/2023    14 days ago he was standing [place] when a log bumped into his left lower leg and cut him.  He has been cleaning with water, soap, and neospororin and bandaid. He has not been able to keep open to air due to discomfort of it rubbing on sheets.  He feels like initially seemed like it was healing though is concerned that for past week the healing has stalled.   Does not have history of poor     Social History     Tobacco Use    Smoking status: Every Day     Current packs/day: 0.50     Average packs/day: 0.5 packs/day for 15.4 years (7.7 ttl pk-yrs)     Types: Cigarettes     Start date: 1/1/2009    Smokeless tobacco: Never    Tobacco comments:     5 cigarettes daily and 14mg nicotine patch   Vaping Use    Vaping status: Never Used   Substance Use Topics    Alcohol use: Not Currently    Drug use: Never     Current Outpatient Medications   Medication Sig Dispense Refill    fenofibrate (TRICOR) 145 MG Tab Take 1 tablet by mouth daily. 90 Tablet 0    allopurinol (ZYLOPRIM) 100 MG Tab Take 2 tablets by mouth daily. 90 Tablet 2    pantoprazole (PROTONIX) 40 MG Tablet Delayed Response Take 40 mg by mouth every day.      vitamin D3 (CHOLECALCIFEROL) 1000 Unit (25 mcg) Tab Take 1,000 Units by mouth every day.       No current facility-administered medications for this visit.     Physical Exam:  /78 (BP Location: Left arm,  "Patient Position: Sitting, BP Cuff Size: Adult)   Pulse 92   Temp 36.4 °C (97.5 °F) (Temporal)   Ht 1.88 m (6' 2\")   Wt 98.2 kg (216 lb 6.4 oz)   SpO2 96%   BMI 27.78 kg/m²   General: Well developed, well nourished male, in no distress.  Eyes: Conjuntiva without any obvious injection or erythema.   Cardiovascular: Heart is regular with out murmur  Lungs: Clear to auscultation bilaterally. No wheezes, rhonci or crackles heard. Respiratory effort is normal.  Abd: Soft, non-tender  Ext: No edema  MSK: tenderness to palpation over the right hip/back along the IT band with tightness appreciated. R knee without effusion, redness. Negative straight leg tests.    Assessment and Plan:   1. Chronic right hip pain  2. Quadriceps tendonitis  Patient has had waxing and waning chronic right knee and hip/back pain.  He previously has completed extensive physical therapy and has been evaluated by [facility] with injection of his lower back area.  Symptoms are starting to return, and given the nature of his clinical presentation query possible TPN involvement that may be cause of his symptoms.  Notably he does have a history of gout however is adherent on his allopurinol and recent uric acid level was at goal.  No evidence of localized joint effusions, redness, restricted range of motion perform less concern for arthritis or autoimmune/auto inflammatory involvement.  Advised patient he continue with his PT exercises that he previously did as a seems to be helping his knee.  Did offer a new referral for PT, however patient would like to defer this as he feels he is well versed in the exercises that have provided symptom   relief in the past.    Advised patient to follow-up with [facility] to see if another injection would be appropriate.  He should follow-up in 4 to 6 weeks to see if there is improvement in his symptoms.      3. Family planning education, guidance, and counseling  Reassurance provided for patient to " he and his wife should be actively trying for at least 12 months prior to pursuing fertility evaluation.    4. Lung nodule  No current symptoms and my exam is nonconcerning.  Encouraged the patient reports had a biopsy in [facility] that he recalls being benign,  however it is reasonable to get an updated chest CT to evaluate the current status of his nodule.    - CT-CHEST (THORAX) W/O; Future    Return in about 1 month (around 7/3/2024) for re-establish and follow up CT chest.  Patient Instructions   Is great seeing you today Gil!    Today we reviewed your ongoing medical issues which include your back/hip and knee pain, and your history of lung nodule.  For your hip and knee pain recommend that you reach back out to [facility] for next steps.  Encourage you to continue doing your home physical therapy exercises for your knee.  Of the hip continues to be bothersome and [facility] is not an option, please reach back out and we can pursue additional workup if needed.    For your lung nodule, have ordered a CT scan of the chest to follow-up on this.  You did have a chest x-ray within the last year that did not mention any nodule which is encouraging.  Though we will get a more thorough image just to be sure.    Lets plan to have you follow-up in approximately 1 month to reestablish with one of the new residents.  That visit should be primarily focused on them reviewing your medical history, though if you have any other acute concerns between now and then, please feel free to reach out sooner if needed.        Andie Mark M.D. PGY3  [facility]

## 2024-06-03 NOTE — PATIENT INSTRUCTIONS
Is great seeing you today Gil!    Today we reviewed your ongoing medical issues which include your back/hip and knee pain, and your history of lung nodule.  For your hip and knee pain recommend that you reach back out to [facility] for next steps.  Encourage you to continue doing your home physical therapy exercises for your knee.  Of the hip continues to be bothersome and [facility] is not an option, please reach back out and we can pursue additional workup if needed.    For your lung nodule, have ordered a CT scan of the chest to follow-up on this.  You did have a chest x-ray within the last year that did not mention any nodule which is encouraging.  Though we will get a more thorough image just to be sure.    Lets plan to have you follow-up in approximately 1 month to reestablish with one of the new residents.  That visit should be primarily focused on them reviewing your medical history, though if you have any other acute concerns between now and then, please feel free to reach out sooner if needed.

## 2024-06-17 ENCOUNTER — HOSPITAL ENCOUNTER (OUTPATIENT)
Dept: RADIOLOGY | Facility: MEDICAL CENTER | Age: 34
End: 2024-06-17
Payer: COMMERCIAL

## 2024-06-17 DIAGNOSIS — R91.1 LUNG NODULE: ICD-10-CM

## 2024-06-17 PROCEDURE — 71250 CT THORAX DX C-: CPT

## 2024-07-18 DIAGNOSIS — Z87.39 PERSONAL HISTORY OF GOUT: ICD-10-CM

## 2024-07-18 DIAGNOSIS — E79.0 HYPERURICEMIA: ICD-10-CM

## 2024-07-18 RX ORDER — ALLOPURINOL 100 MG/1
200 TABLET ORAL DAILY
Qty: 90 TABLET | Refills: 2 | Status: SHIPPED | OUTPATIENT
Start: 2024-07-18

## 2024-08-13 DIAGNOSIS — E78.1 HYPERTRIGLYCERIDEMIA: ICD-10-CM

## 2024-08-13 RX ORDER — FENOFIBRATE 145 MG/1
145 TABLET, COATED ORAL DAILY
Qty: 90 TABLET | Refills: 0 | Status: SHIPPED | OUTPATIENT
Start: 2024-08-13

## 2024-08-13 NOTE — TELEPHONE ENCOUNTER
Received request via: Pharmacy    Was the patient seen in the last year in this department? Yes    Does the patient have an active prescription (recently filled or refills available) for medication(s) requested? No    Pharmacy Name: [facility] [address] [[id number]]     Does the patient have longterm Plus and need 100-day supply? (This applies to ALL medications) Patient does not have SCP    Patient has an appointment scheduled to re-establish care on 09/09

## 2024-09-09 ENCOUNTER — APPOINTMENT (OUTPATIENT)
Dept: INTERNAL MEDICINE | Facility: OTHER | Age: 34
End: 2024-09-09
Payer: COMMERCIAL

## 2024-09-09 VITALS
OXYGEN SATURATION: 97 % | BODY MASS INDEX: 27.8 KG/M2 | SYSTOLIC BLOOD PRESSURE: 113 MMHG | WEIGHT: 216.6 LBS | HEIGHT: 74 IN | DIASTOLIC BLOOD PRESSURE: 73 MMHG | TEMPERATURE: 98.2 F | HEART RATE: 87 BPM

## 2024-09-09 DIAGNOSIS — F17.200 SMOKING: ICD-10-CM

## 2024-09-09 DIAGNOSIS — G44.209 TENSION HEADACHE: ICD-10-CM

## 2024-09-09 DIAGNOSIS — Z31.41 FERTILITY TESTING: ICD-10-CM

## 2024-09-09 PROBLEM — G89.29 CHRONIC RIGHT HIP PAIN: Status: RESOLVED | Noted: 2024-02-06 | Resolved: 2024-09-09

## 2024-09-09 PROBLEM — R00.0 TACHYCARDIA: Status: RESOLVED | Noted: 2022-03-23 | Resolved: 2024-09-09

## 2024-09-09 PROBLEM — G89.29 CHRONIC MIDLINE THORACIC BACK PAIN: Status: RESOLVED | Noted: 2023-01-18 | Resolved: 2024-09-09

## 2024-09-09 PROBLEM — M76.899 QUADRICEPS TENDONITIS: Status: RESOLVED | Noted: 2023-11-09 | Resolved: 2024-09-09

## 2024-09-09 PROBLEM — M25.551 CHRONIC RIGHT HIP PAIN: Status: RESOLVED | Noted: 2024-02-06 | Resolved: 2024-09-09

## 2024-09-09 PROBLEM — M54.6 CHRONIC MIDLINE THORACIC BACK PAIN: Status: RESOLVED | Noted: 2023-01-18 | Resolved: 2024-09-09

## 2024-09-09 PROBLEM — H57.9 EYE PROBLEM: Status: RESOLVED | Noted: 2024-02-06 | Resolved: 2024-09-09

## 2024-09-09 PROBLEM — E66.9 OBESITY (BMI 30-39.9): Status: RESOLVED | Noted: 2022-03-23 | Resolved: 2024-09-09

## 2024-09-09 PROBLEM — Z30.09 FAMILY PLANNING EDUCATION, GUIDANCE, AND COUNSELING: Status: RESOLVED | Noted: 2024-02-06 | Resolved: 2024-09-09

## 2024-09-09 PROCEDURE — 99213 OFFICE O/P EST LOW 20 MIN: CPT | Mod: GE

## 2024-09-09 ASSESSMENT — ENCOUNTER SYMPTOMS
TINGLING: 0
CARDIOVASCULAR NEGATIVE: 1
GASTROINTESTINAL NEGATIVE: 1
MUSCULOSKELETAL NEGATIVE: 1
HEADACHES: 1
EYES NEGATIVE: 1
CONSTITUTIONAL NEGATIVE: 1
RESPIRATORY NEGATIVE: 1
DIZZINESS: 0
PSYCHIATRIC NEGATIVE: 1

## 2024-09-09 ASSESSMENT — FIBROSIS 4 INDEX: FIB4 SCORE: 0.45

## 2024-09-09 NOTE — PROGRESS NOTES
"    Established Patient    Patient Care Team:  Andie Mark M.D. as PCP - General (Internal Medicine)  Keaton Hudson, PT as Physical Therapist (Physical Therapy)    Gil Haskins is a 34 y.o. male who presents today with the following Chief Complaint(s): Follow up for Diagnoses of Tension headache, Smoking, and Fertility testing were pertinent to this visit.    HPI:  Gil Haskins is a 34 year old male patient who presented today for complaints of headaches for 1 month. Pt reports that the headache affects the bilateral temporal and occipital regions, spares the frontal region. Pt reports the headaches lasting 30~ minutes, occurring 1-2 days a week. Pt reports improvement with tylenol use. Pt reports that the pain is 5/10 in intensity without medication, and that the pain goes away with tylenol. Denies nausea, vomiting, dizziness, photophobia.     Pt reports that exertion (verbal), via laughing, talking, worsens the symptoms. Pt reports that there is no significant stressors other than laughing or talking, pt reports no significant positional worsening. The pain feels like there is a pressure that is pushing out of his head.       /73 (BP Location: Left arm, Patient Position: Sitting, BP Cuff Size: Adult)   Pulse 87   Temp 36.8 °C (98.2 °F) (Temporal)   Ht 1.88 m (6' 2\")   Wt 98.2 kg (216 lb 9.6 oz)   SpO2 97%   BMI 27.81 kg/m²   Review of Systems   Constitutional: Negative.    HENT: Negative.     Eyes: Negative.    Respiratory: Negative.     Cardiovascular: Negative.    Gastrointestinal: Negative.    Genitourinary: Negative.    Musculoskeletal: Negative.    Skin: Negative.    Neurological:  Positive for headaches. Negative for dizziness and tingling.   Endo/Heme/Allergies: Negative.    Psychiatric/Behavioral: Negative.         Past Medical History:   Diagnosis Date    COPD (chronic obstructive pulmonary disease) ([facility])     Family planning education, guidance, and counseling 02/06/2024    Has " been actively trying to conceive for the past 6 months.  Wife reportedly has no health issues and regular menstruation.  They have been using the health phone blessing to track menstruation and using ovulation strips.       GERD (gastroesophageal reflux disease)     Hyperlipidemia     Hypertension     Leg wound, left 11/09/2023    14 days ago he was standing [place] when a log bumped into his left lower leg and cut him.  He has been cleaning with water, soap, and neospororin and bandaid. He has not been able to keep open to air due to discomfort of it rubbing on sheets.  He feels like initially seemed like it was healing though is concerned that for past week the healing has stalled.   Does not have history of poor    Tachycardia 03/23/2022    Sinus tachycardia.   Zio patch 05/21/22 without concern for arrythmia; showed sinus tachy average rate of 97, max 175, and 1 episode of SVT lasting 5 beats.   Have considered possible nicotine/caffeine involvement vs panic attacks vs dehydration.   TSH wnl  Less concerned for pheo given normal BP.  No related shortness of breath, sweating, or other associated symptoms.   Patient was on propranolol     Social History     Tobacco Use    Smoking status: Every Day     Current packs/day: 0.50     Average packs/day: 0.5 packs/day for 15.7 years (7.8 ttl pk-yrs)     Types: Cigarettes     Start date: 1/1/2009    Smokeless tobacco: Never    Tobacco comments:     5 cigarettes daily and 14mg nicotine patch   Vaping Use    Vaping status: Never Used   Substance Use Topics    Alcohol use: Not Currently    Drug use: Never     Current Outpatient Medications   Medication Sig Dispense Refill    omeprazole (PRILOSEC) 20 MG delayed-release capsule Take 20 mg by mouth every day.      nicotine polacrilex (NICORETTE) 2 MG Gum Take 1 Each by mouth as needed for Smoking Cessation. 100 Each 2    fenofibrate (TRICOR) 145 MG Tab Take 1 Tablet by mouth every day. 90 Tablet 0    allopurinol (ZYLOPRIM)  100 MG Tab Take 2 Tablets by mouth every day. 90 Tablet 2     No current facility-administered medications for this visit.         Physical Exam  Constitutional:       Appearance: Normal appearance.   HENT:      Right Ear: Tympanic membrane and ear canal normal.      Left Ear: Tympanic membrane and ear canal normal.      Nose: Nose normal.   Cardiovascular:      Rate and Rhythm: Normal rate and regular rhythm.      Pulses: Normal pulses.      Heart sounds: Normal heart sounds.   Pulmonary:      Effort: Pulmonary effort is normal.      Breath sounds: Normal breath sounds.   Abdominal:      General: Bowel sounds are normal.      Palpations: Abdomen is soft.   Musculoskeletal:         General: Normal range of motion.      Cervical back: Normal range of motion and neck supple.   Skin:     General: Skin is warm.   Neurological:      General: No focal deficit present.      Mental Status: He is alert and oriented to person, place, and time. Mental status is at baseline.   Psychiatric:         Mood and Affect: Mood normal.         Thought Content: Thought content normal.         Judgment: Judgment normal.         Assessment and Plan:   1. Tension headache  2. Smoking  Pt reports a 2-3 month history of a band-like headache that occurs 1-2 days a week, and also reports use of tylenol that resolves  the pain. At worst, the pain is 5/10 in intensity, and feelt as an externally pushed out feeling on the temporal regions. The pain is band like, however, spares the frontal region. Pt reports no significant changes in lifestyle. Pt denies active caffeine/energy drink use. Does reports 0.5 PPD cigarette smoking for 16 years, with previous attempts at quitting including nicotine patches. Neuro exam wnl, no deficits noted. At this time, suspecting tension headaches based off history and physical exam. Counseled on avoidance of triggers, active hydration, smoking cessation as it can cause worsening of symptoms. Prescribe nicotine gum  at this visit. Pt agreeable with plan. Will reevaluate at next visit.  - nicotine polacrilex (NICORETTE) 2 MG Gum; Take 1 Each by mouth as needed for Smoking Cessation.  Dispense: 100 Each; Refill: 2      3. Fertility testing  Pt reports attempts at conceiving with wife for 1 year, with no successful conception. Pt reports timing with menstrual cycles as well. At this time, ordering SFA for further evaluation. Will reasses at next visit, refer pt to infertility clinic if needed.  - SEMEN ANALYSIS,POSTVASECTOMY; Future        Orders Placed This Encounter    SEMEN ANALYSIS,POSTVASECTOMY    omeprazole (PRILOSEC) 20 MG delayed-release capsule    nicotine polacrilex (NICORETTE) 2 MG Gum       No follow-ups on file.    Nila Carrington M.D. PGY I  Internal Medicine  [facility]

## 2024-09-11 ENCOUNTER — TELEPHONE (OUTPATIENT)
Dept: INTERNAL MEDICINE | Facility: OTHER | Age: 34
End: 2024-09-11
Payer: COMMERCIAL

## 2024-09-11 DIAGNOSIS — Z72.0 TOBACCO USE: ICD-10-CM

## 2024-09-11 NOTE — TELEPHONE ENCOUNTER
"Received a fax from [facility], requesting clarification on Nicorette Gum.     \"Please verify the directions/Max Daily Dose.  How many pieces of gum max per day?\"  Requesting that you send a new prescription.     Fax is scanned into chart if needed.   "

## 2024-10-22 ENCOUNTER — APPOINTMENT (OUTPATIENT)
Dept: INTERNAL MEDICINE | Facility: OTHER | Age: 34
End: 2024-10-22
Payer: COMMERCIAL

## 2024-12-08 DIAGNOSIS — E78.1 HYPERTRIGLYCERIDEMIA: ICD-10-CM

## 2024-12-09 RX ORDER — FENOFIBRATE 145 MG/1
145 TABLET, COATED ORAL DAILY
Qty: 90 TABLET | Refills: 0 | Status: SHIPPED | OUTPATIENT
Start: 2024-12-09

## 2024-12-09 NOTE — TELEPHONE ENCOUNTER
Received request via: Pharmacy    Was the patient seen in the last year in this department? Yes    Does the patient have an active prescription (recently filled or refills available) for medication(s) requested? No    Pharmacy Name:   To be filled at: [facility] - [place], [place] - [address]          Does the patient have detention Plus and need 100-day supply? (This applies to ALL medications) Patient does not have SCP

## 2025-01-30 DIAGNOSIS — E79.0 HYPERURICEMIA: ICD-10-CM

## 2025-01-30 DIAGNOSIS — Z87.39 PERSONAL HISTORY OF GOUT: ICD-10-CM

## 2025-01-30 NOTE — TELEPHONE ENCOUNTER
Received request via: Pharmacy    Was the patient seen in the last year in this department? Yes    Does the patient have an active prescription (recently filled or refills available) for medication(s) requested? No    Pharmacy Name:   To be filled at: [facility] - [place], [place] - [address]          Does the patient have skilled nursing Plus and need 100-day supply? (This applies to ALL medications) Patient does not have SCP

## 2025-02-02 RX ORDER — ALLOPURINOL 100 MG/1
200 TABLET ORAL DAILY
Qty: 180 TABLET | Refills: 0 | Status: SHIPPED | OUTPATIENT
Start: 2025-02-02